# Patient Record
Sex: FEMALE | Race: WHITE | NOT HISPANIC OR LATINO | Employment: OTHER | URBAN - METROPOLITAN AREA
[De-identification: names, ages, dates, MRNs, and addresses within clinical notes are randomized per-mention and may not be internally consistent; named-entity substitution may affect disease eponyms.]

---

## 2018-04-16 ENCOUNTER — HOSPITAL ENCOUNTER (OUTPATIENT)
Dept: RADIOLOGY | Facility: HOSPITAL | Age: 53
Discharge: HOME | End: 2018-04-16
Attending: FAMILY MEDICINE
Payer: COMMERCIAL

## 2018-04-16 ENCOUNTER — OFFICE VISIT (OUTPATIENT)
Dept: INTERNAL MEDICINE | Facility: CLINIC | Age: 53
End: 2018-04-16
Payer: COMMERCIAL

## 2018-04-16 VITALS
HEIGHT: 65 IN | DIASTOLIC BLOOD PRESSURE: 70 MMHG | BODY MASS INDEX: 27.66 KG/M2 | HEART RATE: 72 BPM | WEIGHT: 166 LBS | TEMPERATURE: 99 F | SYSTOLIC BLOOD PRESSURE: 110 MMHG

## 2018-04-16 DIAGNOSIS — R01.1 MURMUR: ICD-10-CM

## 2018-04-16 DIAGNOSIS — R20.0 NUMBNESS AND TINGLING: ICD-10-CM

## 2018-04-16 DIAGNOSIS — M79.642 BILATERAL HAND PAIN: ICD-10-CM

## 2018-04-16 DIAGNOSIS — M79.672 BILATERAL FOOT PAIN: ICD-10-CM

## 2018-04-16 DIAGNOSIS — M79.641 BILATERAL HAND PAIN: ICD-10-CM

## 2018-04-16 DIAGNOSIS — M79.671 BILATERAL FOOT PAIN: ICD-10-CM

## 2018-04-16 DIAGNOSIS — M54.41 CHRONIC RIGHT-SIDED LOW BACK PAIN WITH RIGHT-SIDED SCIATICA: ICD-10-CM

## 2018-04-16 DIAGNOSIS — M54.41 CHRONIC RIGHT-SIDED LOW BACK PAIN WITH RIGHT-SIDED SCIATICA: Primary | ICD-10-CM

## 2018-04-16 DIAGNOSIS — G89.29 CHRONIC RIGHT-SIDED LOW BACK PAIN WITH RIGHT-SIDED SCIATICA: Primary | ICD-10-CM

## 2018-04-16 DIAGNOSIS — G89.29 CHRONIC RIGHT-SIDED LOW BACK PAIN WITH RIGHT-SIDED SCIATICA: ICD-10-CM

## 2018-04-16 DIAGNOSIS — M25.50 ARTHRALGIA, UNSPECIFIED JOINT: ICD-10-CM

## 2018-04-16 DIAGNOSIS — R53.83 FATIGUE, UNSPECIFIED TYPE: ICD-10-CM

## 2018-04-16 DIAGNOSIS — R20.2 NUMBNESS AND TINGLING: ICD-10-CM

## 2018-04-16 PROCEDURE — 73630 X-RAY EXAM OF FOOT: CPT | Mod: 26,50,, | Performed by: RADIOLOGY

## 2018-04-16 PROCEDURE — 72100 X-RAY EXAM L-S SPINE 2/3 VWS: CPT | Mod: 26,,, | Performed by: RADIOLOGY

## 2018-04-16 PROCEDURE — 72100 X-RAY EXAM L-S SPINE 2/3 VWS: CPT | Mod: TC,FY,PO

## 2018-04-16 PROCEDURE — 99999 PR PBB SHADOW E&M-NEW PATIENT-LVL III: CPT | Mod: PBBFAC,,, | Performed by: FAMILY MEDICINE

## 2018-04-16 PROCEDURE — 73130 X-RAY EXAM OF HAND: CPT | Mod: 26,50,, | Performed by: RADIOLOGY

## 2018-04-16 PROCEDURE — 99204 OFFICE O/P NEW MOD 45 MIN: CPT | Mod: S$GLB,,, | Performed by: FAMILY MEDICINE

## 2018-04-16 PROCEDURE — 73630 X-RAY EXAM OF FOOT: CPT | Mod: 50,TC,FY,PO

## 2018-04-16 PROCEDURE — 73130 X-RAY EXAM OF HAND: CPT | Mod: 50,TC,FY,PO

## 2018-04-16 RX ORDER — LORATADINE 10 MG/1
10 TABLET ORAL DAILY
COMMUNITY
End: 2020-06-08

## 2018-04-16 RX ORDER — FLUTICASONE PROPIONATE 50 MCG
1 SPRAY, SUSPENSION (ML) NASAL DAILY
COMMUNITY
End: 2020-06-08

## 2018-04-17 NOTE — PROGRESS NOTES
"Subjective:      Patient ID: Jhoana Sierra is a 53 y.o. female.    Chief Complaint: Establish Care (joint pain )    HPI  52 yo friendly female here to establish care.  She has been seeing Gyn regularly, up to date on Pap/Mammo/DEXA and fitkit screening.  Reports she has been dealing with chronic joint pain, back pain, muscular/nerve pain for years but it seems to have worsened this year/past 4 mos.  She reports a lot of pain in her hands/wrists/elbows, feet.  Some in her hips and her lower back.    No trauma/injury.  Taking naproxen regularly to help.  Has some joint changes in the fingers.  No swelling/redness/warmth.  Reports just feeling more fatigued overall.  Not sure if related to menopausal time or age.  Bowels move ok  No CP/SOB  Takes allergy meds PRN and some supplements.  No chronic problems that she is aware of.    History reviewed. No pertinent past medical history.  Family History   Problem Relation Age of Onset    Fibromyalgia Sister     Diabetes Neg Hx     Heart disease Neg Hx      Past Surgical History:   Procedure Laterality Date    OVARIAN CYST REMOVAL      WISDOM TOOTH EXTRACTION       Social History   Substance Use Topics    Smoking status: Never Smoker    Smokeless tobacco: Never Used    Alcohol use Yes      Comment: Occ use       /70   Pulse 72   Temp 99.2 °F (37.3 °C) (Tympanic)   Ht 5' 4.5" (1.638 m)   Wt 75.3 kg (166 lb 0.1 oz)   BMI 28.05 kg/m²     Review of Systems   Constitutional: Positive for activity change and fatigue. Negative for appetite change, chills, diaphoresis, fever and unexpected weight change.   HENT: Negative for ear pain, hearing loss, postnasal drip, rhinorrhea, tinnitus and trouble swallowing.    Eyes: Negative for discharge and visual disturbance.   Respiratory: Negative for cough, chest tightness, shortness of breath and wheezing.    Cardiovascular: Negative for chest pain, palpitations and leg swelling.   Gastrointestinal: Negative for " abdominal distention, blood in stool, constipation, diarrhea and vomiting.   Endocrine: Negative for polydipsia and polyuria.   Genitourinary: Negative for difficulty urinating, dysuria, frequency, hematuria, menstrual problem and urgency.   Musculoskeletal: Positive for arthralgias and back pain. Negative for joint swelling and neck pain.   Neurological: Negative for weakness and headaches.   Hematological: Negative for adenopathy.   Psychiatric/Behavioral: Negative for confusion, decreased concentration and dysphoric mood.       Objective:     Physical Exam   Constitutional: She is oriented to person, place, and time. She appears well-developed and well-nourished. No distress.   HENT:   Right Ear: External ear normal.   Left Ear: External ear normal.   Nose: Nose normal.   Mouth/Throat: Oropharynx is clear and moist.   Eyes: Conjunctivae are normal. Pupils are equal, round, and reactive to light.   Neck: Normal range of motion. Neck supple. Carotid bruit is not present. No thyromegaly present.   Cardiovascular: Normal rate and regular rhythm.    Murmur heard.  Pulmonary/Chest: Effort normal and breath sounds normal. No respiratory distress. She has no wheezes. She has no rales.   Abdominal: Soft. Bowel sounds are normal. She exhibits no distension. There is no tenderness. There is no guarding.   Musculoskeletal: She exhibits no edema.        Lumbar back: She exhibits normal range of motion.   BL fingers with some changes at DIP joints   Lymphadenopathy:     She has no cervical adenopathy.   Neurological: She is alert and oriented to person, place, and time. No cranial nerve deficit.   Skin: Skin is warm and dry. No rash noted.   Psychiatric: She has a normal mood and affect. Her behavior is normal. Judgment and thought content normal.   Nursing note and vitals reviewed.      Lab Results   Component Value Date    WBC 5.62 04/16/2018    HGB 13.4 04/16/2018    HCT 40.6 04/16/2018     04/16/2018    TSH 1.899  04/16/2018    HGBA1C 5.3 04/16/2018       Assessment:     1. Chronic right-sided low back pain with right-sided sciatica    2. Bilateral hand pain    3. Bilateral foot pain    4. Numbness and tingling    5. Arthralgia, unspecified joint    6. Fatigue, unspecified type    7. Murmur         Plan:     Chronic right-sided low back pain with right-sided sciatica  -     X-Ray Lumbar Spine AP And Lateral; Future; Expected date: 04/16/2018    Bilateral hand pain  -     X-Ray Hand 3 View Bilateral; Future; Expected date: 04/16/2018    Bilateral foot pain  -     Cancel: X-Ray Foot AP Bilateral; Future; Expected date: 04/16/2018    Numbness and tingling  -     Vitamin B12; Future; Expected date: 04/16/2018  -     Hemoglobin A1c; Future; Expected date: 04/16/2018    Arthralgia, unspecified joint  -     CBC auto differential; Future; Expected date: 04/16/2018  -     KHANG; Future; Expected date: 04/16/2018  -     Sedimentation rate, manual; Future; Expected date: 04/16/2018  -     Rheumatoid factor; Future; Expected date: 04/16/2018  -     Uric acid; Future; Expected date: 04/16/2018    Fatigue, unspecified type  -     TSH; Future; Expected date: 04/16/2018    Murmur  -     2D Echo w/ Color Flow Doppler; Future    Will rule out autoimmune/RF  Xrays shows some arthritic changes.  L spine ok, more muscular type pain.  Once labs come back, consider PT for back.  Mobic/muscle relaxer PRN and more exercise/water exercise.  Murmur is new, never been told she had one. Will get baseline Echo  Will get B12 and A1C, occ tingling feelings  F/u 2 wks for review/treatment plan

## 2018-04-18 ENCOUNTER — CLINICAL SUPPORT (OUTPATIENT)
Dept: CARDIOLOGY | Facility: CLINIC | Age: 53
End: 2018-04-18
Attending: FAMILY MEDICINE
Payer: COMMERCIAL

## 2018-04-18 DIAGNOSIS — R01.1 MURMUR: ICD-10-CM

## 2018-04-18 LAB
DIASTOLIC DYSFUNCTION: NO
ESTIMATED PA SYSTOLIC PRESSURE: 27.67
RETIRED EF AND QEF - SEE NOTES: 60 (ref 55–65)
TRICUSPID VALVE REGURGITATION: NORMAL

## 2018-04-18 PROCEDURE — 93306 TTE W/DOPPLER COMPLETE: CPT | Mod: S$GLB,,, | Performed by: INTERNAL MEDICINE

## 2018-04-30 ENCOUNTER — OFFICE VISIT (OUTPATIENT)
Dept: INTERNAL MEDICINE | Facility: CLINIC | Age: 53
End: 2018-04-30
Payer: COMMERCIAL

## 2018-04-30 VITALS
SYSTOLIC BLOOD PRESSURE: 120 MMHG | TEMPERATURE: 99 F | WEIGHT: 168.44 LBS | DIASTOLIC BLOOD PRESSURE: 90 MMHG | HEART RATE: 64 BPM | HEIGHT: 65 IN | BODY MASS INDEX: 28.06 KG/M2

## 2018-04-30 DIAGNOSIS — M79.7 FIBROMYALGIA: ICD-10-CM

## 2018-04-30 DIAGNOSIS — Z00.00 ANNUAL PHYSICAL EXAM: Primary | ICD-10-CM

## 2018-04-30 DIAGNOSIS — M15.9 GENERALIZED OA: ICD-10-CM

## 2018-04-30 PROCEDURE — 99396 PREV VISIT EST AGE 40-64: CPT | Mod: S$GLB,,, | Performed by: FAMILY MEDICINE

## 2018-04-30 PROCEDURE — 99999 PR PBB SHADOW E&M-EST. PATIENT-LVL III: CPT | Mod: PBBFAC,,, | Performed by: FAMILY MEDICINE

## 2018-04-30 RX ORDER — TIZANIDINE 4 MG/1
4 TABLET ORAL 3 TIMES DAILY PRN
Qty: 30 TABLET | Refills: 1 | Status: SHIPPED | OUTPATIENT
Start: 2018-04-30 | End: 2018-09-04 | Stop reason: SDUPTHER

## 2018-04-30 RX ORDER — MELOXICAM 15 MG/1
15 TABLET ORAL DAILY PRN
Qty: 30 TABLET | Refills: 1 | Status: SHIPPED | OUTPATIENT
Start: 2018-04-30 | End: 2018-09-04 | Stop reason: SDUPTHER

## 2018-04-30 NOTE — PROGRESS NOTES
"Subjective:      Patient ID: Jhoana Sierra is a 53 y.o. female.    Chief Complaint: Follow-up    HPI  54 yo female here for f/u from last visit and to update annual.  Gyn/Fit Kit already done/normal.  Adacel up to date    History reviewed. No pertinent past medical history.  Family History   Problem Relation Age of Onset    Fibromyalgia Sister     Diabetes Neg Hx     Heart disease Neg Hx      Past Surgical History:   Procedure Laterality Date    OVARIAN CYST REMOVAL      WISDOM TOOTH EXTRACTION       Social History   Substance Use Topics    Smoking status: Never Smoker    Smokeless tobacco: Never Used    Alcohol use Yes      Comment: Occ use       BP (!) 120/90 (BP Location: Right arm, Patient Position: Sitting, BP Method: Large (Manual))   Pulse 64   Temp 99.3 °F (37.4 °C) (Tympanic)   Ht 5' 4.5" (1.638 m)   Wt 76.4 kg (168 lb 6.9 oz)   BMI 28.46 kg/m²     Review of Systems   Constitutional: Negative for activity change, appetite change, chills, diaphoresis, fever and unexpected weight change.   HENT: Negative for ear pain, hearing loss, postnasal drip, rhinorrhea, tinnitus and trouble swallowing.    Eyes: Negative for discharge and visual disturbance.   Respiratory: Negative for cough, chest tightness, shortness of breath and wheezing.    Cardiovascular: Negative for chest pain, palpitations and leg swelling.   Gastrointestinal: Negative for abdominal distention, blood in stool, constipation, diarrhea and vomiting.   Endocrine: Negative for polydipsia and polyuria.   Genitourinary: Negative for difficulty urinating, dysuria, frequency, hematuria, menstrual problem and urgency.   Musculoskeletal: Positive for arthralgias. Negative for back pain, joint swelling and neck pain.   Neurological: Negative for weakness and headaches.   Hematological: Negative for adenopathy.   Psychiatric/Behavioral: Negative for confusion, decreased concentration and dysphoric mood.       Objective:     Physical Exam "   Constitutional: She is oriented to person, place, and time. She appears well-developed and well-nourished. No distress.   HENT:   Right Ear: External ear normal.   Left Ear: External ear normal.   Nose: Nose normal.   Mouth/Throat: Oropharynx is clear and moist.   Eyes: Conjunctivae are normal. Pupils are equal, round, and reactive to light.   Neck: Normal range of motion. Neck supple. Carotid bruit is not present.   Cardiovascular: Normal rate and regular rhythm.  Exam reveals no gallop.    Murmur heard.  Pulmonary/Chest: Effort normal and breath sounds normal. No respiratory distress. She has no wheezes. She has no rales.   Abdominal: Soft. Bowel sounds are normal. She exhibits no distension. There is no tenderness. There is no guarding.   Musculoskeletal: Normal range of motion. She exhibits deformity. She exhibits no edema.   Changes in DIP of BL hands   Neurological: She is alert and oriented to person, place, and time. No cranial nerve deficit.   Skin: Skin is warm and dry. No rash noted.   Psychiatric: She has a normal mood and affect. Her behavior is normal. Judgment and thought content normal.   Nursing note and vitals reviewed.      Lab Results   Component Value Date    WBC 5.62 04/16/2018    HGB 13.4 04/16/2018    HCT 40.6 04/16/2018     04/16/2018    TSH 1.899 04/16/2018    HGBA1C 5.3 04/16/2018       Assessment:     1. Annual physical exam    2. Generalized OA    3. Fibromyalgia         Plan:     Annual physical exam  -     Lipid panel; Future; Expected date: 05/02/2018  -     Comprehensive metabolic panel; Future; Expected date: 05/02/2018  -     Hepatitis C antibody; Future; Expected date: 05/02/2018    Generalized OA  -     Ambulatory Referral to Physical/Occupational Therapy    Fibromyalgia  -     Ambulatory Referral to Physical/Occupational Therapy    Other orders  -     meloxicam (MOBIC) 15 MG tablet; Take 1 tablet (15 mg total) by mouth daily as needed for Pain.  Dispense: 30 tablet;  Refill: 1  -     tiZANidine (ZANAFLEX) 4 MG tablet; Take 1 tablet (4 mg total) by mouth 3 (three) times daily as needed.  Dispense: 30 tablet; Refill: 1    Reviewed labs/imaging after last visit  Update screening labs this week  Findings/symptoms consistent with OA/possibly fibro  Trial of mobic/zanaflex PRN  Recommend working on diet, starting WW.  Will send to aquatic therapy as well  F/u 3-4 mos

## 2018-05-02 ENCOUNTER — LAB VISIT (OUTPATIENT)
Dept: LAB | Facility: HOSPITAL | Age: 53
End: 2018-05-02
Attending: FAMILY MEDICINE
Payer: COMMERCIAL

## 2018-05-02 DIAGNOSIS — Z00.00 ANNUAL PHYSICAL EXAM: ICD-10-CM

## 2018-05-02 LAB
ALBUMIN SERPL BCP-MCNC: 4.3 G/DL
ALP SERPL-CCNC: 74 U/L
ALT SERPL W/O P-5'-P-CCNC: 16 U/L
ANION GAP SERPL CALC-SCNC: 10 MMOL/L
AST SERPL-CCNC: 18 U/L
BILIRUB SERPL-MCNC: 0.7 MG/DL
BUN SERPL-MCNC: 26 MG/DL
CALCIUM SERPL-MCNC: 10.3 MG/DL
CHLORIDE SERPL-SCNC: 103 MMOL/L
CHOLEST SERPL-MCNC: 223 MG/DL
CHOLEST/HDLC SERPL: 3.1 {RATIO}
CO2 SERPL-SCNC: 28 MMOL/L
CREAT SERPL-MCNC: 0.8 MG/DL
EST. GFR  (AFRICAN AMERICAN): >60 ML/MIN/1.73 M^2
EST. GFR  (NON AFRICAN AMERICAN): >60 ML/MIN/1.73 M^2
GLUCOSE SERPL-MCNC: 80 MG/DL
HDLC SERPL-MCNC: 73 MG/DL
HDLC SERPL: 32.7 %
LDLC SERPL CALC-MCNC: 134.2 MG/DL
NONHDLC SERPL-MCNC: 150 MG/DL
POTASSIUM SERPL-SCNC: 4.6 MMOL/L
PROT SERPL-MCNC: 7.9 G/DL
SODIUM SERPL-SCNC: 141 MMOL/L
TRIGL SERPL-MCNC: 79 MG/DL

## 2018-05-02 PROCEDURE — 80061 LIPID PANEL: CPT

## 2018-05-02 PROCEDURE — 86803 HEPATITIS C AB TEST: CPT

## 2018-05-02 PROCEDURE — 36415 COLL VENOUS BLD VENIPUNCTURE: CPT | Mod: PO

## 2018-05-02 PROCEDURE — 80053 COMPREHEN METABOLIC PANEL: CPT

## 2018-05-03 LAB — HCV AB SERPL QL IA: NEGATIVE

## 2018-09-04 ENCOUNTER — PATIENT MESSAGE (OUTPATIENT)
Dept: INTERNAL MEDICINE | Facility: CLINIC | Age: 53
End: 2018-09-04

## 2018-09-04 RX ORDER — TIZANIDINE 4 MG/1
TABLET ORAL
COMMUNITY
Start: 2018-06-21 | End: 2018-09-04 | Stop reason: SDUPTHER

## 2018-09-04 RX ORDER — TIZANIDINE HYDROCHLORIDE 4 MG/1
CAPSULE, GELATIN COATED ORAL 3 TIMES DAILY PRN
COMMUNITY
Start: 2018-04-30 | End: 2019-06-07 | Stop reason: SDUPTHER

## 2018-09-04 RX ORDER — TIZANIDINE 4 MG/1
TABLET ORAL
Qty: 30 TABLET | Refills: 1 | Status: SHIPPED | OUTPATIENT
Start: 2018-09-04 | End: 2019-06-07 | Stop reason: SDUPTHER

## 2018-09-04 RX ORDER — MELOXICAM 15 MG/1
15 TABLET ORAL DAILY PRN
Qty: 30 TABLET | Refills: 1 | Status: SHIPPED | OUTPATIENT
Start: 2018-09-04 | End: 2019-06-07 | Stop reason: SDUPTHER

## 2019-05-13 ENCOUNTER — PATIENT MESSAGE (OUTPATIENT)
Dept: ADMINISTRATIVE | Facility: HOSPITAL | Age: 54
End: 2019-05-13

## 2019-05-14 DIAGNOSIS — Z12.11 COLON CANCER SCREENING: ICD-10-CM

## 2019-06-07 ENCOUNTER — OFFICE VISIT (OUTPATIENT)
Dept: INTERNAL MEDICINE | Facility: CLINIC | Age: 54
End: 2019-06-07
Payer: COMMERCIAL

## 2019-06-07 VITALS
SYSTOLIC BLOOD PRESSURE: 112 MMHG | DIASTOLIC BLOOD PRESSURE: 78 MMHG | BODY MASS INDEX: 24.65 KG/M2 | HEIGHT: 65 IN | HEART RATE: 74 BPM | TEMPERATURE: 99 F | WEIGHT: 147.94 LBS

## 2019-06-07 DIAGNOSIS — Z00.00 ANNUAL PHYSICAL EXAM: Primary | ICD-10-CM

## 2019-06-07 PROCEDURE — 99999 PR PBB SHADOW E&M-EST. PATIENT-LVL III: ICD-10-PCS | Mod: PBBFAC,,, | Performed by: FAMILY MEDICINE

## 2019-06-07 PROCEDURE — 99999 PR PBB SHADOW E&M-EST. PATIENT-LVL III: CPT | Mod: PBBFAC,,, | Performed by: FAMILY MEDICINE

## 2019-06-07 PROCEDURE — 99396 PREV VISIT EST AGE 40-64: CPT | Mod: S$GLB,,, | Performed by: FAMILY MEDICINE

## 2019-06-07 PROCEDURE — 99396 PR PREVENTIVE VISIT,EST,40-64: ICD-10-PCS | Mod: S$GLB,,, | Performed by: FAMILY MEDICINE

## 2019-06-07 RX ORDER — TIZANIDINE 4 MG/1
4 TABLET ORAL 3 TIMES DAILY PRN
Qty: 30 TABLET | Refills: 3 | Status: SHIPPED | OUTPATIENT
Start: 2019-06-07 | End: 2021-05-05 | Stop reason: SDUPTHER

## 2019-06-07 RX ORDER — MELOXICAM 15 MG/1
15 TABLET ORAL DAILY PRN
Qty: 30 TABLET | Refills: 3 | Status: SHIPPED | OUTPATIENT
Start: 2019-06-07 | End: 2020-09-01 | Stop reason: SDUPTHER

## 2019-06-07 RX ORDER — ESTRADIOL AND NORETHINDRONE ACETATE 1; .5 MG/1; MG/1
1 TABLET, FILM COATED ORAL DAILY
Refills: 5 | COMMUNITY
Start: 2019-05-21 | End: 2020-06-08

## 2019-06-07 NOTE — PROGRESS NOTES
"Subjective:      Patient ID: Jhoana Sierra is a 54 y.o. female.    Chief Complaint: Annual Exam    HPI   55 yo female here today for annual visit.  She is doing great overall.  Since she cut out glutein, weight is down a lot and her pain has been better controlled with the Fibro.  She still uses mobic/zanaflex on PRN basis.  On HRT per her gyn    History reviewed. No pertinent past medical history.  Family History   Problem Relation Age of Onset    Fibromyalgia Sister     Diabetes Neg Hx     Heart disease Neg Hx      Past Surgical History:   Procedure Laterality Date    OVARIAN CYST REMOVAL      WISDOM TOOTH EXTRACTION       Social History     Tobacco Use    Smoking status: Never Smoker    Smokeless tobacco: Never Used   Substance Use Topics    Alcohol use: Yes     Frequency: Monthly or less     Drinks per session: 1 or 2     Binge frequency: Never     Comment: Occ use    Drug use: No       /78 (BP Location: Left arm, Patient Position: Sitting, BP Method: Large (Manual))   Pulse 74   Temp 98.8 °F (37.1 °C) (Oral)   Ht 5' 4.5" (1.638 m)   Wt 67.1 kg (147 lb 14.9 oz)   BMI 25.00 kg/m²     Review of Systems   Constitutional: Negative for activity change, appetite change, chills, diaphoresis, fatigue, fever and unexpected weight change.   HENT: Negative for ear pain, hearing loss, postnasal drip, rhinorrhea, tinnitus and trouble swallowing.    Eyes: Negative for discharge and visual disturbance.   Respiratory: Negative for cough, chest tightness, shortness of breath and wheezing.    Cardiovascular: Negative for chest pain, palpitations and leg swelling.   Gastrointestinal: Negative for abdominal distention, blood in stool, constipation, diarrhea and vomiting.   Endocrine: Negative for polydipsia and polyuria.   Genitourinary: Negative for difficulty urinating, dysuria, frequency, hematuria, menstrual problem and urgency.   Musculoskeletal: Negative for arthralgias, back pain, joint swelling and " neck pain.   Neurological: Negative for weakness and headaches.   Hematological: Negative for adenopathy.   Psychiatric/Behavioral: Negative for confusion, decreased concentration and dysphoric mood.       Objective:     Physical Exam   Constitutional: She is oriented to person, place, and time. She appears well-developed and well-nourished. No distress.   HENT:   Right Ear: External ear normal.   Left Ear: External ear normal.   Nose: Nose normal.   Mouth/Throat: Oropharynx is clear and moist.   Eyes: Pupils are equal, round, and reactive to light. Conjunctivae are normal.   Neck: Normal range of motion. Neck supple. Carotid bruit is not present. No thyromegaly present.   Cardiovascular: Normal rate, regular rhythm and normal heart sounds.   Pulmonary/Chest: Effort normal and breath sounds normal. No respiratory distress. She has no wheezes. She has no rales.   Abdominal: Soft. Bowel sounds are normal. She exhibits no distension. There is no tenderness. There is no guarding.   Musculoskeletal: She exhibits no edema.   Lymphadenopathy:     She has no cervical adenopathy.   Neurological: She is alert and oriented to person, place, and time. No cranial nerve deficit.   Skin: Skin is warm and dry. No rash noted.   Psychiatric: She has a normal mood and affect. Her behavior is normal. Judgment and thought content normal.   Nursing note and vitals reviewed.      Lab Results   Component Value Date    WBC 5.62 04/16/2018    HGB 13.4 04/16/2018    HCT 40.6 04/16/2018     04/16/2018    CHOL 223 (H) 05/02/2018    TRIG 79 05/02/2018    HDL 73 05/02/2018    ALT 16 05/02/2018    AST 18 05/02/2018     05/02/2018    K 4.6 05/02/2018     05/02/2018    CREATININE 0.8 05/02/2018    BUN 26 (H) 05/02/2018    CO2 28 05/02/2018    TSH 1.899 04/16/2018    HGBA1C 5.3 04/16/2018       Assessment:     1. Annual physical exam         Plan:     Annual physical exam  -     CBC auto differential; Future; Expected date:  06/07/2019  -     Comprehensive metabolic panel; Future; Expected date: 06/07/2019  -     Lipid panel; Future; Expected date: 06/07/2019    Other orders  -     meloxicam (MOBIC) 15 MG tablet; Take 1 tablet (15 mg total) by mouth daily as needed for Pain.  Dispense: 30 tablet; Refill: 3  -     tiZANidine (ZANAFLEX) 4 MG tablet; Take 1 tablet (4 mg total) by mouth 3 (three) times daily as needed.  Dispense: 30 tablet; Refill: 3    Update screening labs  Med refills  Pt doing great with weight and exercise.    Cont current regimen, meds  F/u annually and PRN  Did her fitkit with her Gyn

## 2019-06-11 ENCOUNTER — LAB VISIT (OUTPATIENT)
Dept: LAB | Facility: HOSPITAL | Age: 54
End: 2019-06-11
Attending: FAMILY MEDICINE
Payer: COMMERCIAL

## 2019-06-11 DIAGNOSIS — Z00.00 ANNUAL PHYSICAL EXAM: ICD-10-CM

## 2019-06-11 LAB
BASOPHILS # BLD AUTO: 0.02 K/UL (ref 0–0.2)
BASOPHILS NFR BLD: 0.5 % (ref 0–1.9)
DIFFERENTIAL METHOD: ABNORMAL
EOSINOPHIL # BLD AUTO: 0.1 K/UL (ref 0–0.5)
EOSINOPHIL NFR BLD: 1.5 % (ref 0–8)
ERYTHROCYTE [DISTWIDTH] IN BLOOD BY AUTOMATED COUNT: 12.6 % (ref 11.5–14.5)
HCT VFR BLD AUTO: 40.9 % (ref 37–48.5)
HGB BLD-MCNC: 13.3 G/DL (ref 12–16)
IMM GRANULOCYTES # BLD AUTO: 0.01 K/UL (ref 0–0.04)
IMM GRANULOCYTES NFR BLD AUTO: 0.2 % (ref 0–0.5)
LYMPHOCYTES # BLD AUTO: 1.3 K/UL (ref 1–4.8)
LYMPHOCYTES NFR BLD: 32.5 % (ref 18–48)
MCH RBC QN AUTO: 32 PG (ref 27–31)
MCHC RBC AUTO-ENTMCNC: 32.5 G/DL (ref 32–36)
MCV RBC AUTO: 99 FL (ref 82–98)
MONOCYTES # BLD AUTO: 0.3 K/UL (ref 0.3–1)
MONOCYTES NFR BLD: 6.7 % (ref 4–15)
NEUTROPHILS # BLD AUTO: 2.4 K/UL (ref 1.8–7.7)
NEUTROPHILS NFR BLD: 58.6 % (ref 38–73)
NRBC BLD-RTO: 0 /100 WBC
PLATELET # BLD AUTO: 231 K/UL (ref 150–350)
PMV BLD AUTO: 10 FL (ref 9.2–12.9)
RBC # BLD AUTO: 4.15 M/UL (ref 4–5.4)
WBC # BLD AUTO: 4.06 K/UL (ref 3.9–12.7)

## 2019-06-11 PROCEDURE — 85025 COMPLETE CBC W/AUTO DIFF WBC: CPT

## 2019-06-11 PROCEDURE — 80053 COMPREHEN METABOLIC PANEL: CPT

## 2019-06-11 PROCEDURE — 80061 LIPID PANEL: CPT

## 2019-06-11 PROCEDURE — 36415 COLL VENOUS BLD VENIPUNCTURE: CPT | Mod: PO

## 2019-06-12 LAB
ALBUMIN SERPL BCP-MCNC: 4 G/DL (ref 3.5–5.2)
ALP SERPL-CCNC: 57 U/L (ref 55–135)
ALT SERPL W/O P-5'-P-CCNC: 17 U/L (ref 10–44)
ANION GAP SERPL CALC-SCNC: 11 MMOL/L (ref 8–16)
AST SERPL-CCNC: 18 U/L (ref 10–40)
BILIRUB SERPL-MCNC: 0.4 MG/DL (ref 0.1–1)
BUN SERPL-MCNC: 20 MG/DL (ref 6–20)
CALCIUM SERPL-MCNC: 9.8 MG/DL (ref 8.7–10.5)
CHLORIDE SERPL-SCNC: 105 MMOL/L (ref 95–110)
CHOLEST SERPL-MCNC: 195 MG/DL (ref 120–199)
CHOLEST/HDLC SERPL: 2.7 {RATIO} (ref 2–5)
CO2 SERPL-SCNC: 24 MMOL/L (ref 23–29)
CREAT SERPL-MCNC: 0.8 MG/DL (ref 0.5–1.4)
EST. GFR  (AFRICAN AMERICAN): >60 ML/MIN/1.73 M^2
EST. GFR  (NON AFRICAN AMERICAN): >60 ML/MIN/1.73 M^2
GLUCOSE SERPL-MCNC: 81 MG/DL (ref 70–110)
HDLC SERPL-MCNC: 71 MG/DL (ref 40–75)
HDLC SERPL: 36.4 % (ref 20–50)
LDLC SERPL CALC-MCNC: 105.8 MG/DL (ref 63–159)
NONHDLC SERPL-MCNC: 124 MG/DL
POTASSIUM SERPL-SCNC: 4.2 MMOL/L (ref 3.5–5.1)
PROT SERPL-MCNC: 7.2 G/DL (ref 6–8.4)
SODIUM SERPL-SCNC: 140 MMOL/L (ref 136–145)
TRIGL SERPL-MCNC: 91 MG/DL (ref 30–150)

## 2019-10-04 ENCOUNTER — PATIENT OUTREACH (OUTPATIENT)
Dept: ADMINISTRATIVE | Facility: HOSPITAL | Age: 54
End: 2019-10-04

## 2019-11-22 ENCOUNTER — PATIENT MESSAGE (OUTPATIENT)
Dept: ADMINISTRATIVE | Facility: HOSPITAL | Age: 54
End: 2019-11-22

## 2020-06-08 ENCOUNTER — OFFICE VISIT (OUTPATIENT)
Dept: INTERNAL MEDICINE | Facility: CLINIC | Age: 55
End: 2020-06-08
Payer: COMMERCIAL

## 2020-06-08 VITALS
WEIGHT: 151.69 LBS | SYSTOLIC BLOOD PRESSURE: 122 MMHG | BODY MASS INDEX: 25.27 KG/M2 | DIASTOLIC BLOOD PRESSURE: 78 MMHG | TEMPERATURE: 98 F | HEIGHT: 65 IN | HEART RATE: 74 BPM

## 2020-06-08 DIAGNOSIS — Z00.00 ANNUAL PHYSICAL EXAM: Primary | ICD-10-CM

## 2020-06-08 DIAGNOSIS — Z12.11 COLON CANCER SCREENING: ICD-10-CM

## 2020-06-08 PROCEDURE — 99396 PR PREVENTIVE VISIT,EST,40-64: ICD-10-PCS | Mod: S$GLB,,, | Performed by: FAMILY MEDICINE

## 2020-06-08 PROCEDURE — 99396 PREV VISIT EST AGE 40-64: CPT | Mod: S$GLB,,, | Performed by: FAMILY MEDICINE

## 2020-06-08 PROCEDURE — 99999 PR PBB SHADOW E&M-EST. PATIENT-LVL III: CPT | Mod: PBBFAC,,, | Performed by: FAMILY MEDICINE

## 2020-06-08 PROCEDURE — 99999 PR PBB SHADOW E&M-EST. PATIENT-LVL III: ICD-10-PCS | Mod: PBBFAC,,, | Performed by: FAMILY MEDICINE

## 2020-06-08 NOTE — PROGRESS NOTES
"Subjective:      Patient ID: Jhoana Sierra is a 55 y.o. female.    Chief Complaint: Annual Exam    HPI   56 yo female here for annual visit.  Doing well overall.  Struggles with chronic allergy/congestion.  Antihist and flonase dry her out too much/causing nasal bleeding  Sudafed most helpful  Has not done colonoscopy//has been doing fit kit    History reviewed. No pertinent past medical history.  Family History   Problem Relation Age of Onset    Fibromyalgia Sister     Diabetes Neg Hx     Heart disease Neg Hx      Past Surgical History:   Procedure Laterality Date    OVARIAN CYST REMOVAL      WISDOM TOOTH EXTRACTION       Social History     Tobacco Use    Smoking status: Never Smoker    Smokeless tobacco: Never Used   Substance Use Topics    Alcohol use: Yes     Frequency: Monthly or less     Drinks per session: 1 or 2     Binge frequency: Never     Comment: Occ use    Drug use: No       /78 (BP Location: Left arm, Patient Position: Sitting, BP Method: Large (Manual))   Pulse 74   Temp 98.3 °F (36.8 °C) (Oral)   Ht 5' 4.5" (1.638 m)   Wt 68.8 kg (151 lb 10.8 oz)   BMI 25.63 kg/m²     Review of Systems   Constitutional: Negative for activity change and unexpected weight change.   HENT: Negative for hearing loss, rhinorrhea and trouble swallowing.    Eyes: Negative for discharge and visual disturbance.   Respiratory: Negative for chest tightness and wheezing.    Cardiovascular: Negative for chest pain and palpitations.   Gastrointestinal: Negative for blood in stool, constipation, diarrhea and vomiting.   Endocrine: Negative for polydipsia and polyuria.   Genitourinary: Negative for difficulty urinating, dysuria, hematuria and menstrual problem.   Musculoskeletal: Negative for arthralgias, joint swelling and neck pain.   Neurological: Negative for weakness and headaches.   Psychiatric/Behavioral: Negative for confusion and dysphoric mood.       Objective:     Physical Exam   Constitutional: " She is oriented to person, place, and time. She appears well-developed and well-nourished. No distress.   HENT:   Nose: Nose normal.   Mouth/Throat: Oropharynx is clear and moist.   Eyes: Pupils are equal, round, and reactive to light. Conjunctivae are normal.   Neck: Normal range of motion. Neck supple. Carotid bruit is not present.   Cardiovascular: Normal rate, regular rhythm and normal heart sounds.   Pulmonary/Chest: Effort normal and breath sounds normal. No respiratory distress. She has no wheezes. She has no rales.   Abdominal: Soft. Bowel sounds are normal. She exhibits no distension. There is no tenderness. There is no guarding.   Musculoskeletal: She exhibits no edema.   Neurological: She is alert and oriented to person, place, and time. No cranial nerve deficit.   Skin: Skin is warm and dry. No rash noted.   Psychiatric: She has a normal mood and affect. Her behavior is normal. Judgment and thought content normal.   Nursing note and vitals reviewed.      Lab Results   Component Value Date    WBC 4.06 06/11/2019    HGB 13.3 06/11/2019    HCT 40.9 06/11/2019     06/11/2019    CHOL 195 06/11/2019    TRIG 91 06/11/2019    HDL 71 06/11/2019    ALT 17 06/11/2019    AST 18 06/11/2019     06/11/2019    K 4.2 06/11/2019     06/11/2019    CREATININE 0.8 06/11/2019    BUN 20 06/11/2019    CO2 24 06/11/2019    TSH 1.899 04/16/2018    HGBA1C 5.3 04/16/2018       Assessment:     1. Annual physical exam    2. Colon cancer screening         Plan:     Annual physical exam  -     CBC auto differential; Future; Expected date: 06/08/2020  -     Comprehensive metabolic panel; Future; Expected date: 06/08/2020  -     Hemoglobin A1C; Future; Expected date: 06/08/2020  -     Lipid Panel; Future; Expected date: 06/08/2020  -     TSH; Future; Expected date: 06/08/2020    Colon cancer screening  -     Fecal Immunochemical Test (iFOBT); Future; Expected date: 06/08/2020    Update annual labs today  Fit  Kit//recommend colonoscopy for baseline  Ok to use sudafed PRN  Healthy lifestyle  Get mammo/dexa from [facility]  F/u annually and PRN

## 2020-06-10 ENCOUNTER — LAB VISIT (OUTPATIENT)
Dept: LAB | Facility: HOSPITAL | Age: 55
End: 2020-06-10
Attending: FAMILY MEDICINE
Payer: COMMERCIAL

## 2020-06-10 DIAGNOSIS — Z00.00 ANNUAL PHYSICAL EXAM: ICD-10-CM

## 2020-06-10 DIAGNOSIS — Z12.11 COLON CANCER SCREENING: ICD-10-CM

## 2020-06-10 LAB
ALBUMIN SERPL BCP-MCNC: 4.2 G/DL (ref 3.5–5.2)
ALP SERPL-CCNC: 77 U/L (ref 55–135)
ALT SERPL W/O P-5'-P-CCNC: 16 U/L (ref 10–44)
ANION GAP SERPL CALC-SCNC: 7 MMOL/L (ref 8–16)
AST SERPL-CCNC: 17 U/L (ref 10–40)
BASOPHILS # BLD AUTO: 0.03 K/UL (ref 0–0.2)
BASOPHILS NFR BLD: 0.7 % (ref 0–1.9)
BILIRUB SERPL-MCNC: 0.4 MG/DL (ref 0.1–1)
BUN SERPL-MCNC: 19 MG/DL (ref 6–20)
CALCIUM SERPL-MCNC: 9.8 MG/DL (ref 8.7–10.5)
CHLORIDE SERPL-SCNC: 105 MMOL/L (ref 95–110)
CHOLEST SERPL-MCNC: 206 MG/DL (ref 120–199)
CHOLEST/HDLC SERPL: 2.7 {RATIO} (ref 2–5)
CO2 SERPL-SCNC: 29 MMOL/L (ref 23–29)
CREAT SERPL-MCNC: 0.8 MG/DL (ref 0.5–1.4)
DIFFERENTIAL METHOD: ABNORMAL
EOSINOPHIL # BLD AUTO: 0.1 K/UL (ref 0–0.5)
EOSINOPHIL NFR BLD: 2.2 % (ref 0–8)
ERYTHROCYTE [DISTWIDTH] IN BLOOD BY AUTOMATED COUNT: 12.8 % (ref 11.5–14.5)
EST. GFR  (AFRICAN AMERICAN): >60 ML/MIN/1.73 M^2
EST. GFR  (NON AFRICAN AMERICAN): >60 ML/MIN/1.73 M^2
GLUCOSE SERPL-MCNC: 84 MG/DL (ref 70–110)
HCT VFR BLD AUTO: 40.3 % (ref 37–48.5)
HDLC SERPL-MCNC: 75 MG/DL (ref 40–75)
HDLC SERPL: 36.4 % (ref 20–50)
HGB BLD-MCNC: 12.8 G/DL (ref 12–16)
IMM GRANULOCYTES # BLD AUTO: 0.01 K/UL (ref 0–0.04)
IMM GRANULOCYTES NFR BLD AUTO: 0.2 % (ref 0–0.5)
LDLC SERPL CALC-MCNC: 113.4 MG/DL (ref 63–159)
LYMPHOCYTES # BLD AUTO: 1.6 K/UL (ref 1–4.8)
LYMPHOCYTES NFR BLD: 35.4 % (ref 18–48)
MCH RBC QN AUTO: 30.5 PG (ref 27–31)
MCHC RBC AUTO-ENTMCNC: 31.8 G/DL (ref 32–36)
MCV RBC AUTO: 96 FL (ref 82–98)
MONOCYTES # BLD AUTO: 0.3 K/UL (ref 0.3–1)
MONOCYTES NFR BLD: 7 % (ref 4–15)
NEUTROPHILS # BLD AUTO: 2.5 K/UL (ref 1.8–7.7)
NEUTROPHILS NFR BLD: 54.5 % (ref 38–73)
NONHDLC SERPL-MCNC: 131 MG/DL
NRBC BLD-RTO: 0 /100 WBC
PLATELET # BLD AUTO: 248 K/UL (ref 150–350)
PMV BLD AUTO: 10.1 FL (ref 9.2–12.9)
POTASSIUM SERPL-SCNC: 4.3 MMOL/L (ref 3.5–5.1)
PROT SERPL-MCNC: 7.5 G/DL (ref 6–8.4)
RBC # BLD AUTO: 4.2 M/UL (ref 4–5.4)
SODIUM SERPL-SCNC: 141 MMOL/L (ref 136–145)
TRIGL SERPL-MCNC: 88 MG/DL (ref 30–150)
TSH SERPL DL<=0.005 MIU/L-ACNC: 2.59 UIU/ML (ref 0.4–4)
WBC # BLD AUTO: 4.57 K/UL (ref 3.9–12.7)

## 2020-06-10 PROCEDURE — 80053 COMPREHEN METABOLIC PANEL: CPT

## 2020-06-10 PROCEDURE — 83036 HEMOGLOBIN GLYCOSYLATED A1C: CPT

## 2020-06-10 PROCEDURE — 85025 COMPLETE CBC W/AUTO DIFF WBC: CPT

## 2020-06-10 PROCEDURE — 36415 COLL VENOUS BLD VENIPUNCTURE: CPT | Mod: PO

## 2020-06-10 PROCEDURE — 84443 ASSAY THYROID STIM HORMONE: CPT

## 2020-06-10 PROCEDURE — 82274 ASSAY TEST FOR BLOOD FECAL: CPT

## 2020-06-10 PROCEDURE — 80061 LIPID PANEL: CPT

## 2020-06-11 LAB
ESTIMATED AVG GLUCOSE: 103 MG/DL (ref 68–131)
HBA1C MFR BLD HPLC: 5.2 % (ref 4–5.6)

## 2020-06-12 ENCOUNTER — PATIENT OUTREACH (OUTPATIENT)
Dept: ADMINISTRATIVE | Facility: HOSPITAL | Age: 55
End: 2020-06-12

## 2020-06-25 LAB — HEMOCCULT STL QL IA: POSITIVE

## 2020-06-26 ENCOUNTER — PATIENT MESSAGE (OUTPATIENT)
Dept: INTERNAL MEDICINE | Facility: CLINIC | Age: 55
End: 2020-06-26

## 2020-06-26 DIAGNOSIS — Z12.11 COLON CANCER SCREENING: Primary | ICD-10-CM

## 2020-06-26 DIAGNOSIS — R19.5 POSITIVE FECAL IMMUNOCHEMICAL TEST: ICD-10-CM

## 2020-06-26 NOTE — TELEPHONE ENCOUNTER
Called and let pt know that stool kit was + for blood she will need to do colonoscopy.  Dr. Nguyen placed orders and the Gastro  will call to schedule her.

## 2020-07-02 ENCOUNTER — TELEPHONE (OUTPATIENT)
Dept: ENDOSCOPY | Facility: HOSPITAL | Age: 55
End: 2020-07-02

## 2020-07-02 NOTE — TELEPHONE ENCOUNTER
COVID Screening     1. Have you had a fever in the last 7 days or have you used fever reducing medicines for a fever in the last 7 days?  yes    Advised pt to follow up with PCP. [email or web address]

## 2020-07-19 ENCOUNTER — LAB VISIT (OUTPATIENT)
Dept: PRIMARY CARE CLINIC | Facility: CLINIC | Age: 55
End: 2020-07-19
Payer: COMMERCIAL

## 2020-07-19 DIAGNOSIS — Z00.6 RESEARCH SUBJECT: Primary | ICD-10-CM

## 2020-07-19 LAB — SARS-COV-2 IGG SERPLBLD QL IA.RAPID: NEGATIVE

## 2020-07-19 PROCEDURE — U0003 INFECTIOUS AGENT DETECTION BY NUCLEIC ACID (DNA OR RNA); SEVERE ACUTE RESPIRATORY SYNDROME CORONAVIRUS 2 (SARS-COV-2) (CORONAVIRUS DISEASE [COVID-19]), AMPLIFIED PROBE TECHNIQUE, MAKING USE OF HIGH THROUGHPUT TECHNOLOGIES AS DESCRIBED BY CMS-2020-01-R: HCPCS

## 2020-07-19 PROCEDURE — 86769 SARS-COV-2 COVID-19 ANTIBODY: CPT

## 2020-07-19 NOTE — RESEARCH
Date of Consent: 7/19/2020    Sponsor: [facility]    Study Title/IRB Number: Observational study of Sars-CoV2 Immunoglobulin G (IgG) seroprevalence among the [facility] population over time 2020.163  Principle Investigator: Madonna Morris, PhD    Did the patient need translation services? No   name: N/A    Prior to the Informed Consent (IC) being signed, or any study protocol required data collection, testing, procedure, or intervention being performed, the following was done and/or discussed:   Patient was given a paper copy of the IC for review    Patient was given study FAQ   Purpose of the study and qualifications to participate    Study design and tests or procedures done at this visit   Confidentiality and HIPAA Authorization for Release of Medical Records for the research trial/ subject's rights/research related injury   Risk, Benefits, Alternative Treatments, Compensation and Costs   Participation in the research trial is voluntary and patient may withdraw at anytime   Contact information for study related questions    Patient verbalizes understanding of the above: Yes  Contact information for PI and IRB given to patient: Yes  Patient able to adequately summarize: the purpose of the study, the risks associated with the study, and all procedures, testing, and follow-ups associated with the study: Yes    The consent was discussed verbally with the patient and all questions were answered satisfactorily. Patient gave verbal consent for the Seroprevalence research study with an IRB approval date of 06/23/2020.      The Consent, Consent Witness and name of Clinical Research Coordinator consenting was captured and documented in [employer].    All Inclusion and Exclusion Criteria reviewed, subject meets all Inclusion criteria and does not meet any Exclusion Criteria at this time.     Patient Eligibility was confirmed.    Patient responded to survey questions.    The following biospecimen  collection procedures were collected:    -Nasopharyngeal Swab Collection  -Blood collection

## 2020-07-22 LAB — SARS-COV-2 RNA RESP QL NAA+PROBE: NOT DETECTED

## 2020-07-27 ENCOUNTER — PATIENT OUTREACH (OUTPATIENT)
Dept: ADMINISTRATIVE | Facility: HOSPITAL | Age: 55
End: 2020-07-27

## 2020-07-27 NOTE — PROGRESS NOTES
Contacted patient to follow up on overdue Colonoscopy. Patient has a scheduled colonoscopy 08/25/2020    Covid testing scheduled

## 2020-08-19 ENCOUNTER — TELEPHONE (OUTPATIENT)
Dept: PREADMISSION TESTING | Facility: HOSPITAL | Age: 55
End: 2020-08-19

## 2020-08-19 RX ORDER — PSEUDOEPHEDRINE HCL 30 MG
30 TABLET ORAL EVERY 4 HOURS PRN
COMMUNITY
End: 2022-02-09

## 2020-08-19 NOTE — TELEPHONE ENCOUNTER
Left vm message instructing patient to obtain Covid test on Saturday between 8-12pm @ [facility] site in [facility] in [place]. . Contact info given.

## 2020-08-19 NOTE — TELEPHONE ENCOUNTER
PAT call completed and patient educated on the bowel prep, clear liquid diet and procedure instructions. Medical history discussed and patient informed of arrival time 0830am and 2nd prep dose 0500. Pt will be accompanied by spouse and is made aware of limited-visitor policy, and is made aware that  is to remain during entire visit. All questions and concerns addressed. Bowel prep ordered to patient's verified pharmacy and copy of instructions sent via [employer]. Informed to take no AM medications. NPO after 2nd bowel prep. Patient verbalizes understanding of teaching and all instructions. Pre-procedure Covid testing @ [facility] site at [facility]. Patient aware.

## 2020-08-19 NOTE — PRE-PROCEDURE INSTRUCTIONS
PAT call completed and patient educated on the bowel prep, clear liquid diet and procedure instructions. Medical history discussed and patient informed of arrival time 0830am and 2nd prep dose 0500. Pt will be accompanied by spouse and is made aware of limited-visitor policy, and is made aware that  is to remain during entire visit. All questions and concerns addressed. Bowel prep ordered to patient's verified pharmacy and copy of instructions sent via [employer]. Informed to take no AM medications. NPO after 2nd bowel prep. Patient verbalizes understanding of teaching and all instructions. Pre-procedure Covid testing @ [facility] site at [facility]. Patient aware.

## 2020-08-19 NOTE — PRE ADMISSION SCREENING
Left vm message instructing patient to obtain Covid test on Saturday between 8-12pm. Contact info given.

## 2020-08-22 ENCOUNTER — LAB VISIT (OUTPATIENT)
Dept: URGENT CARE | Facility: CLINIC | Age: 55
End: 2020-08-22
Payer: COMMERCIAL

## 2020-08-22 VITALS — OXYGEN SATURATION: 98 % | TEMPERATURE: 98 F | HEART RATE: 89 BPM

## 2020-08-22 DIAGNOSIS — Z03.818 ENCOUNTER FOR OBSERVATION FOR SUSPECTED EXPOSURE TO OTHER BIOLOGICAL AGENTS RULED OUT: ICD-10-CM

## 2020-08-22 PROCEDURE — U0003 INFECTIOUS AGENT DETECTION BY NUCLEIC ACID (DNA OR RNA); SEVERE ACUTE RESPIRATORY SYNDROME CORONAVIRUS 2 (SARS-COV-2) (CORONAVIRUS DISEASE [COVID-19]), AMPLIFIED PROBE TECHNIQUE, MAKING USE OF HIGH THROUGHPUT TECHNOLOGIES AS DESCRIBED BY CMS-2020-01-R: HCPCS

## 2020-08-23 ENCOUNTER — TELEPHONE (OUTPATIENT)
Dept: URGENT CARE | Facility: CLINIC | Age: 55
End: 2020-08-23

## 2020-08-23 LAB — SARS-COV-2 RNA RESP QL NAA+PROBE: NOT DETECTED

## 2020-08-24 ENCOUNTER — ANESTHESIA EVENT (OUTPATIENT)
Dept: ENDOSCOPY | Facility: HOSPITAL | Age: 55
End: 2020-08-24
Payer: COMMERCIAL

## 2020-08-24 PROBLEM — Z12.11 ENCOUNTER FOR SCREENING COLONOSCOPY: Status: ACTIVE | Noted: 2020-08-24

## 2020-08-25 ENCOUNTER — ANESTHESIA (OUTPATIENT)
Dept: ENDOSCOPY | Facility: HOSPITAL | Age: 55
End: 2020-08-25
Payer: COMMERCIAL

## 2020-08-25 ENCOUNTER — HOSPITAL ENCOUNTER (OUTPATIENT)
Facility: HOSPITAL | Age: 55
Discharge: HOME | End: 2020-08-25
Attending: INTERNAL MEDICINE | Admitting: INTERNAL MEDICINE
Payer: COMMERCIAL

## 2020-08-25 VITALS
SYSTOLIC BLOOD PRESSURE: 115 MMHG | OXYGEN SATURATION: 100 % | HEIGHT: 64 IN | TEMPERATURE: 98 F | DIASTOLIC BLOOD PRESSURE: 67 MMHG | WEIGHT: 145.94 LBS | RESPIRATION RATE: 19 BRPM | HEART RATE: 64 BPM | BODY MASS INDEX: 24.92 KG/M2

## 2020-08-25 DIAGNOSIS — K63.89 COLONIC MASS: Primary | ICD-10-CM

## 2020-08-25 DIAGNOSIS — R19.5 POSITIVE FIT (FECAL IMMUNOCHEMICAL TEST): ICD-10-CM

## 2020-08-25 DIAGNOSIS — Z12.11 ENCOUNTER FOR SCREENING COLONOSCOPY: ICD-10-CM

## 2020-08-25 PROCEDURE — D9220A PRA ANESTHESIA: ICD-10-PCS | Mod: ANES,,, | Performed by: ANESTHESIOLOGY

## 2020-08-25 PROCEDURE — 63600175 PHARM REV CODE 636 W HCPCS: Performed by: INTERNAL MEDICINE

## 2020-08-25 PROCEDURE — 27201028 HC NEEDLE, SCLERO: Performed by: INTERNAL MEDICINE

## 2020-08-25 PROCEDURE — 27201012 HC FORCEPS, HOT/COLD, DISP: Performed by: INTERNAL MEDICINE

## 2020-08-25 PROCEDURE — D9220A PRA ANESTHESIA: Mod: CRNA,,, | Performed by: NURSE ANESTHETIST, CERTIFIED REGISTERED

## 2020-08-25 PROCEDURE — 45380 COLONOSCOPY AND BIOPSY: CPT | Mod: ,,, | Performed by: INTERNAL MEDICINE

## 2020-08-25 PROCEDURE — 88305 TISSUE EXAM BY PATHOLOGIST: CPT | Performed by: PATHOLOGY

## 2020-08-25 PROCEDURE — 45380 COLONOSCOPY AND BIOPSY: CPT | Performed by: INTERNAL MEDICINE

## 2020-08-25 PROCEDURE — 45380 PR COLONOSCOPY,BIOPSY: ICD-10-PCS | Mod: ,,, | Performed by: INTERNAL MEDICINE

## 2020-08-25 PROCEDURE — 45381 COLONOSCOPY SUBMUCOUS NJX: CPT | Performed by: INTERNAL MEDICINE

## 2020-08-25 PROCEDURE — 63600175 PHARM REV CODE 636 W HCPCS: Performed by: NURSE ANESTHETIST, CERTIFIED REGISTERED

## 2020-08-25 PROCEDURE — 37000009 HC ANESTHESIA EA ADD 15 MINS: Performed by: INTERNAL MEDICINE

## 2020-08-25 PROCEDURE — D9220A PRA ANESTHESIA: Mod: ANES,,, | Performed by: ANESTHESIOLOGY

## 2020-08-25 PROCEDURE — 37000008 HC ANESTHESIA 1ST 15 MINUTES: Performed by: INTERNAL MEDICINE

## 2020-08-25 PROCEDURE — 88305 TISSUE EXAM BY PATHOLOGIST: CPT | Mod: 26,,, | Performed by: PATHOLOGY

## 2020-08-25 PROCEDURE — 88305 TISSUE EXAM BY PATHOLOGIST: ICD-10-PCS | Mod: 26,,, | Performed by: PATHOLOGY

## 2020-08-25 PROCEDURE — 27202363 HC INJECTION AGENT, SUBMUCOSAL, ANY: Performed by: INTERNAL MEDICINE

## 2020-08-25 PROCEDURE — 45381 PR COLONOSCPY,FLEX,W/DIR SUBMUC INJECT: ICD-10-PCS | Mod: 51,,, | Performed by: INTERNAL MEDICINE

## 2020-08-25 PROCEDURE — D9220A PRA ANESTHESIA: ICD-10-PCS | Mod: CRNA,,, | Performed by: NURSE ANESTHETIST, CERTIFIED REGISTERED

## 2020-08-25 PROCEDURE — 45381 COLONOSCOPY SUBMUCOUS NJX: CPT | Mod: 51,,, | Performed by: INTERNAL MEDICINE

## 2020-08-25 RX ORDER — SODIUM CHLORIDE 0.9 % (FLUSH) 0.9 %
10 SYRINGE (ML) INJECTION
Status: DISCONTINUED | OUTPATIENT
Start: 2020-08-25 | End: 2020-08-25 | Stop reason: HOSPADM

## 2020-08-25 RX ORDER — ONDANSETRON 2 MG/ML
4 INJECTION INTRAMUSCULAR; INTRAVENOUS DAILY PRN
Status: DISCONTINUED | OUTPATIENT
Start: 2020-08-25 | End: 2020-08-25 | Stop reason: HOSPADM

## 2020-08-25 RX ORDER — LIDOCAINE HYDROCHLORIDE 20 MG/ML
INJECTION INTRAVENOUS
Status: DISCONTINUED | OUTPATIENT
Start: 2020-08-25 | End: 2020-08-25

## 2020-08-25 RX ORDER — SODIUM CHLORIDE, SODIUM LACTATE, POTASSIUM CHLORIDE, CALCIUM CHLORIDE 600; 310; 30; 20 MG/100ML; MG/100ML; MG/100ML; MG/100ML
INJECTION, SOLUTION INTRAVENOUS CONTINUOUS
Status: DISCONTINUED | OUTPATIENT
Start: 2020-08-25 | End: 2020-08-25 | Stop reason: HOSPADM

## 2020-08-25 RX ORDER — PROPOFOL 10 MG/ML
VIAL (ML) INTRAVENOUS
Status: DISCONTINUED | OUTPATIENT
Start: 2020-08-25 | End: 2020-08-25

## 2020-08-25 RX ADMIN — Medication 20 MG: at 10:08

## 2020-08-25 RX ADMIN — PROPOFOL 100 MG: 10 INJECTION, EMULSION INTRAVENOUS at 10:08

## 2020-08-25 RX ADMIN — PROPOFOL 50 MG: 10 INJECTION, EMULSION INTRAVENOUS at 10:08

## 2020-08-25 RX ADMIN — SODIUM CHLORIDE, SODIUM LACTATE, POTASSIUM CHLORIDE, AND CALCIUM CHLORIDE: 600; 310; 30; 20 INJECTION, SOLUTION INTRAVENOUS at 09:08

## 2020-08-25 NOTE — H&P
Short Stay Endoscopy History and Physical    PCP - Dhrvu Nguyen MD    Procedure - Colonoscopy  ASA - 2  Mallampati - per anesthesia  History of Anesthesia problems - no  Family history Anesthesia problems -  no     HPI:  This is a 55 y.o. female here for evaluation of :   Active Hospital Problems    Diagnosis  POA    *Positive FIT (fecal immunochemical test) [R19.5]  Clinically Undetermined      Resolved Hospital Problems   No resolved problems to display.         Health Maintenance       Date Due Completion Date    HIV Screening 01/10/1980 ---    TETANUS VACCINE 01/10/1983 ---    Cervical Cancer Screening 01/10/1986 ---    Shingles Vaccine (1 of 2) 01/10/2015 ---    DEXA SCAN 03/10/2020 3/10/2017    Colorectal Cancer Screening 06/26/2020 6/25/2020    Influenza Vaccine (1) 09/01/2020 10/11/2019    Mammogram 03/26/2021 3/26/2019    Lipid Panel 06/10/2025 6/10/2020          Screening - yes  History of polyps - no  Diarrhea - no  Anemia - no  Blood in stools - no  Abdominal pain - no  Family History of Colon Cancer- no  Other - no    ROS:  CONSTITUTIONAL: Denies weight change,  fatigue, fevers, chills, night sweats.  CARDIOVASCULAR: Denies chest pain, shortness of breath, orthopnea and edema.  RESPIRATORY: Denies cough, hemoptysis, dyspnea, and wheezing.  GI: See HPI.    Medical History:   Past Medical History:   Diagnosis Date    PONV (postoperative nausea and vomiting)        Surgical History:   Past Surgical History:   Procedure Laterality Date    OVARIAN CYST REMOVAL      WISDOM TOOTH EXTRACTION         Family History:   Family History   Problem Relation Age of Onset    Fibromyalgia Sister     Diabetes Neg Hx     Heart disease Neg Hx        Social History:   Social History     Tobacco Use    Smoking status: Never Smoker    Smokeless tobacco: Never Used   Substance Use Topics    Alcohol use: Yes     Frequency: Monthly or less     Drinks per session: 1 or 2     Binge frequency: Never     Comment: Occ  use    Drug use: No       Allergies:   Review of patient's allergies indicates:  No Known Allergies    Medications:   No current facility-administered medications on file prior to encounter.      Current Outpatient Medications on File Prior to Encounter   Medication Sig Dispense Refill    multivitamin with minerals tablet Take 1 tablet by mouth once daily.      pseudoephedrine (SUDAFED) 30 MG tablet Take 30 mg by mouth every 4 (four) hours as needed for Congestion.      soy isofla/blk cohosh/mag bark (ESTROVEN ORAL) Take 1 tablet by mouth once daily.      tiZANidine (ZANAFLEX) 4 MG tablet Take 1 tablet (4 mg total) by mouth 3 (three) times daily as needed. 30 tablet 3    meloxicam (MOBIC) 15 MG tablet Take 1 tablet (15 mg total) by mouth daily as needed for Pain. 30 tablet 3       Physical Exam:  Vital Signs:   Vitals:    08/25/20 0917   BP: 135/79   Pulse: 79   Resp: 16   Temp: 98.1 °F (36.7 °C)     General Appearance: Well appearing in no acute distress  ENT: OP clear  Chest: CTA B  CV: RRR, no m/r/g  Abd: s/nt/nd/nabs  Ext: no edema    Labs:Reviewed    IMP:  Active Hospital Problems    Diagnosis  POA    *Positive FIT (fecal immunochemical test) [R19.5]  Clinically Undetermined      Resolved Hospital Problems   No resolved problems to display.         Plan:   I have explained the risks and benefits of colonoscopy to the patient including but not limited to bleeding, perforation, infection, and death. The patient wishes to proceed.

## 2020-08-25 NOTE — PLAN OF CARE
Pt is AAOx4. VSS. NAD. Denies pain or nausea. DIscharge instructions given, verbalized understanding. Discharged home with family.

## 2020-08-25 NOTE — DISCHARGE SUMMARY
Endoscopy Discharge Summary      Admit Date: 8/25/2020    Discharge Date and Time:  8/25/2020 10:28 AM    Attending Physician: Rianna Dillon MD     Discharge Physician: Rianna Dillon MD     Principal Admitting Diagnoses: Positive FIT (fecal immunochemical test)         Discharge Diagnosis: The primary encounter diagnosis was Colonic mass. Diagnoses of Encounter for screening colonoscopy and Positive FIT (fecal immunochemical test) were also pertinent to this visit.     Discharged Condition: Good    Indication for Admission: Positive FIT (fecal immunochemical test)     Hospital Course: Patient was admitted for an inpatient procedure and tolerated the procedure well with no complications.    Significant Diagnostic Studies: Colonoscopy with biopsy and tattoo    Pathology (if any): Rectosigmoid mass biopsies    Estimated Blood Loss: 1 ml.    Discussed with: patient.    Disposition: Home.    Follow Up/Patient Instructions:   Current Discharge Medication List      CONTINUE these medications which have NOT CHANGED    Details   multivitamin with minerals tablet Take 1 tablet by mouth once daily.      pseudoephedrine (SUDAFED) 30 MG tablet Take 30 mg by mouth every 4 (four) hours as needed for Congestion.      soy isofla/blk cohosh/mag bark (ESTROVEN ORAL) Take 1 tablet by mouth once daily.      tiZANidine (ZANAFLEX) 4 MG tablet Take 1 tablet (4 mg total) by mouth 3 (three) times daily as needed.  Qty: 30 tablet, Refills: 3    Comments: Please consider 90 day supplies to promote better adherence      meloxicam (MOBIC) 15 MG tablet Take 1 tablet (15 mg total) by mouth daily as needed for Pain.  Qty: 30 tablet, Refills: 3             Discharge Procedure Orders   Ambulatory referral/consult to Colorectal Surgery   Standing Status: Future   Referral Priority: Urgent Referral Type: Consultation   Referral Reason: Specialty Services Required   Referred to Provider: MILLY BARKER Requested Specialty: Colon and  Rectal Surgery   Number of Visits Requested: 1           Rianna Dillon MD  Gastroenterology and Hepatology

## 2020-08-25 NOTE — [employer] PATIENT INSTRUCTIONS
Discharge Summary/Instructions after an Endoscopic Procedure  Patient Name: Jhoana Fuentes  Patient MRN: [id number]  Patient YOB: 1965  Tuesday, August 25, 2020  Rianna Dillon MD  RESTRICTIONS:  During your procedure today, you received medications for sedation.  These   medications may affect your judgment, balance and coordination.  Therefore,   for 24 hours, you have the following restrictions:   - DO NOT drive a car, operate machinery, make legal/financial decisions,   sign important papers or drink alcohol.    ACTIVITY:  Today: no heavy lifting, straining or running due to procedural   sedation/anesthesia.  The following day: return to full activity including work.  DIET:  Eat and drink normally unless instructed otherwise.     TREATMENT FOR COMMON SIDE EFFECTS:  - Mild abdominal pain, nausea, belching, bloating or excessive gas:  rest,   eat lightly and use a heating pad.  - Sore Throat: treat with throat lozenges and/or gargle with warm salt   water.  - Because air was used during the procedure, expelling large amounts of air   from your rectum or belching is normal.  - If a bowel prep was taken, you may not have a bowel movement for 1-3 days.    This is normal.  SYMPTOMS TO WATCH FOR AND REPORT TO YOUR PHYSICIAN:  1. Abdominal pain or bloating, other than gas cramps.  2. Chest pain.  3. Back pain.  4. Signs of infection such as: chills or fever occurring within 24 hours   after the procedure.  5. Rectal bleeding, which would show as bright red, maroon, or black stools.   (A tablespoon of blood from the rectum is not serious, especially if   hemorrhoids are present.)  6. Vomiting.  7. Weakness or dizziness.  GO DIRECTLY TO THE NEAREST EMERGENCY ROOM IF YOU HAVE ANY OF THE FOLLOWING:      Difficulty breathing              Chills and/or fever over 101 F   Persistent vomiting and/or vomiting blood   Severe abdominal pain   Severe chest pain   Black, tarry stools   Bleeding- more than one  tablespoon   Any other symptom or condition that you feel may need urgent attention  Your doctor recommends these additional instructions:  If any biopsies were taken, your doctors clinic will contact you in 1 to 2   weeks with any results.  - Discharge patient to home (via wheelchair).   - Resume previous diet.   - Continue present medications.   - Await pathology results.   - Refer to a colo-rectal surgeon at the next available appointment.   - Referral to oncology pending pathology results  - Repeat colonoscopy for surveillance based on pathology results.  For questions, problems or results please call your physician Rianna Dillon MD at Work:  [phone]  If you have any questions about the above instructions, call the GI   department at [phone] or call the endoscopy unit at [phone]   from 7am until 3 pm.  [facility] [place], EMERGENCY ROOM PHONE NUMBER:   [phone]  IF A COMPLICATION OR EMERGENCY SITUATION ARISES AND YOU ARE UNABLE TO REACH   YOUR PHYSICIAN - GO DIRECTLY TO THE EMERGENCY ROOM.  I have read or have had read to me these discharge instructions for my   procedure and have received a written copy.  I understand these   instructions and will follow-up with my physician if I have any questions.     __________________________________       _____________________________________  Nurse Signature                                          Patient/Designated   Responsible Party Signature  MD Rianna Cruz MD  8/25/2020 10:26:38 AM  [employer]

## 2020-08-25 NOTE — ANESTHESIA POSTPROCEDURE EVALUATION
Anesthesia Post Evaluation    Patient: Jhoana Sierra    Procedure(s) Performed: Procedure(s) (LRB):  COLONOSCOPY (N/A)    Final Anesthesia Type: general    Patient location during evaluation: PACU  Patient participation: Yes- Able to Participate  Level of consciousness: awake and alert and oriented  Post-procedure vital signs: reviewed and stable  Pain management: adequate  Airway patency: patent    PONV status at discharge: No PONV  Anesthetic complications: no      Cardiovascular status: blood pressure returned to baseline, stable and hemodynamically stable  Respiratory status: unassisted  Hydration status: euvolemic  Follow-up not needed.          Vitals Value Taken Time   /67 08/25/20 1055   Temp 36.8 °C (98.2 °F) 08/25/20 1055   Pulse 64 08/25/20 1055   Resp 19 08/25/20 1055   SpO2 100 % 08/25/20 1055         Event Time   Out of Recovery 10:57:00         Pain/Jackson Score: Jackson Score: 10 (8/25/2020 10:55 AM)

## 2020-08-25 NOTE — TRANSFER OF CARE
"Anesthesia Transfer of Care Note    Patient: Jhoana Sierra    Procedure(s) Performed: Procedure(s) (LRB):  COLONOSCOPY (N/A)    Patient location: PACU    Anesthesia Type: general    Transport from OR: Transported from OR on room air with adequate spontaneous ventilation    Post pain: adequate analgesia    Post assessment: no apparent anesthetic complications and tolerated procedure well    Post vital signs: stable    Level of consciousness: awake    Nausea/Vomiting: no nausea/vomiting    Complications: none    Transfer of care protocol was followed      Last vitals:   Visit Vitals  /79   Pulse 79   Temp 36.7 °C (98.1 °F) (Temporal)   Resp 16   Ht 5' 4" (1.626 m)   Wt 66.2 kg (145 lb 15.1 oz)   LMP 08/25/2017   SpO2 100%   Breastfeeding No   BMI 25.05 kg/m²     "

## 2020-08-25 NOTE — DISCHARGE INSTRUCTIONS
Colonoscopy     A camera attached to a flexible tube with a viewing lens is used to take video pictures.     Colonoscopy is a test to view the inside of your lower digestive tract (colon and rectum). Sometimes it can show the last part of the small intestine (ileum). During the test, small pieces of tissue may be removed for testing. This is called a biopsy. Small growths, such as polyps, may also be removed.   Why is colonoscopy done?  The test is done to help look for colon cancer. And it can help find the source of abdominal pain, bleeding, and changes in bowel habits. It may be needed once a year, depending on factors such as your:  · Age  · Health history  · Family health history  · Symptoms  · Results from any prior colonoscopy  Risks and possible complications  These include:  · Bleeding               · A puncture or tear in the colon   · Risks of anesthesia  · A cancer lesion not being seen  Getting ready   To prepare for the test:  · Talk with your healthcare provider about the risks of the test (see below). Also ask your healthcare provider about alternatives to the test.  · Tell your healthcare provider about any medicines you take. Also tell him or her about any health conditions you may have.  · Make sure your rectum and colon are empty for the test. Follow the diet and bowel prep instructions exactly. If you dont, the test may need to be rescheduled.  · Plan for a friend or family member to drive you home after the test.     Colonoscopy provides an inside view of the entire colon.     You may discuss the results with your doctor right away or at a future visit.  During the test   The test is usually done in the hospital on an outpatient basis. This means you go home the same day. The procedure takes about 30 minutes. During that time:  · You are given relaxing (sedating) medicine through an IV line. You may be drowsy, or fully asleep.  · The healthcare provider will first give you a physical exam to  check for anal and rectal problems.  · Then the anus is lubricated and the scope inserted.  · If you are awake, you may have a feeling similar to needing to have a bowel movement. You may also feel pressure as air is pumped into the colon. Its OK to pass gas during the procedure.  · Biopsy, polyp removal, or other treatments may be done during the test.  After the test   You may have gas right after the test. It can help to try to pass it to help prevent later bloating. Your healthcare provider may discuss the results with you right away. Or you may need to schedule a follow-up visit to talk about the results. After the test, you can go back to your normal eating and other activities. You may be tired from the sedation and need to rest for a few hours.  Date Last Reviewed: 11/1/2016 © 2000-2017 [employer]. [address] [place] [address], [place] [address]. All rights reserved. This information is not intended as a substitute for professional medical care. Always follow your healthcare professional's instructions.

## 2020-09-02 LAB
FINAL PATHOLOGIC DIAGNOSIS: NORMAL
GROSS: NORMAL

## 2020-09-03 ENCOUNTER — TELEPHONE (OUTPATIENT)
Dept: HEMATOLOGY/ONCOLOGY | Facility: CLINIC | Age: 55
End: 2020-09-03

## 2020-09-03 DIAGNOSIS — C18.9 COLON ADENOCARCINOMA: Primary | ICD-10-CM

## 2020-09-03 NOTE — TELEPHONE ENCOUNTER
Spoke with patient over the phone today in regards to referral to oncology from Dr. Dillon.  Pt is in agreement to see Dr Stuart 9/9/20 at the cancer center and then go over to the [place] location to see Dr. Hannah .  She is in agreement with this plan . She has my direct call back number for any further questions or concerns.

## 2020-09-06 ENCOUNTER — PATIENT OUTREACH (OUTPATIENT)
Dept: ADMINISTRATIVE | Facility: OTHER | Age: 55
End: 2020-09-06

## 2020-09-08 DIAGNOSIS — C18.9 COLON ADENOCARCINOMA: Primary | ICD-10-CM

## 2020-09-09 ENCOUNTER — TELEPHONE (OUTPATIENT)
Dept: HEMATOLOGY/ONCOLOGY | Facility: CLINIC | Age: 55
End: 2020-09-09

## 2020-09-09 ENCOUNTER — HOSPITAL ENCOUNTER (OUTPATIENT)
Dept: CARDIOLOGY | Facility: HOSPITAL | Age: 55
Discharge: HOME | End: 2020-09-09
Attending: COLON & RECTAL SURGERY
Payer: COMMERCIAL

## 2020-09-09 ENCOUNTER — LAB VISIT (OUTPATIENT)
Dept: LAB | Facility: HOSPITAL | Age: 55
End: 2020-09-09
Attending: COLON & RECTAL SURGERY
Payer: COMMERCIAL

## 2020-09-09 ENCOUNTER — OFFICE VISIT (OUTPATIENT)
Dept: HEMATOLOGY/ONCOLOGY | Facility: CLINIC | Age: 55
End: 2020-09-09
Payer: COMMERCIAL

## 2020-09-09 ENCOUNTER — OFFICE VISIT (OUTPATIENT)
Dept: SURGERY | Facility: CLINIC | Age: 55
End: 2020-09-09
Payer: COMMERCIAL

## 2020-09-09 ENCOUNTER — HOSPITAL ENCOUNTER (OUTPATIENT)
Dept: RADIOLOGY | Facility: HOSPITAL | Age: 55
Discharge: HOME | End: 2020-09-09
Attending: COLON & RECTAL SURGERY
Payer: COMMERCIAL

## 2020-09-09 VITALS
HEART RATE: 71 BPM | OXYGEN SATURATION: 98 % | HEIGHT: 64 IN | SYSTOLIC BLOOD PRESSURE: 132 MMHG | BODY MASS INDEX: 25.41 KG/M2 | WEIGHT: 148.81 LBS | RESPIRATION RATE: 18 BRPM | DIASTOLIC BLOOD PRESSURE: 78 MMHG | TEMPERATURE: 98 F

## 2020-09-09 VITALS
BODY MASS INDEX: 25.62 KG/M2 | WEIGHT: 149.25 LBS | HEART RATE: 83 BPM | SYSTOLIC BLOOD PRESSURE: 127 MMHG | TEMPERATURE: 99 F | DIASTOLIC BLOOD PRESSURE: 70 MMHG | OXYGEN SATURATION: 98 %

## 2020-09-09 DIAGNOSIS — C18.9 COLON ADENOCARCINOMA: ICD-10-CM

## 2020-09-09 DIAGNOSIS — K63.89 COLONIC MASS: ICD-10-CM

## 2020-09-09 DIAGNOSIS — Z01.818 PRE-OP TESTING: Primary | ICD-10-CM

## 2020-09-09 DIAGNOSIS — C18.9 COLON ADENOCARCINOMA: Primary | ICD-10-CM

## 2020-09-09 DIAGNOSIS — C18.9 ADENOCARCINOMA OF COLON: ICD-10-CM

## 2020-09-09 DIAGNOSIS — Z01.818 PRE-OP TESTING: ICD-10-CM

## 2020-09-09 LAB
ALBUMIN SERPL BCP-MCNC: 4.3 G/DL (ref 3.5–5.2)
ALP SERPL-CCNC: 79 U/L (ref 55–135)
ALT SERPL W/O P-5'-P-CCNC: 14 U/L (ref 10–44)
ANION GAP SERPL CALC-SCNC: 6 MMOL/L (ref 8–16)
AST SERPL-CCNC: 16 U/L (ref 10–40)
BASOPHILS # BLD AUTO: 0.02 K/UL (ref 0–0.2)
BASOPHILS NFR BLD: 0.5 % (ref 0–1.9)
BILIRUB SERPL-MCNC: 0.5 MG/DL (ref 0.1–1)
BUN SERPL-MCNC: 14 MG/DL (ref 6–20)
CALCIUM SERPL-MCNC: 9.3 MG/DL (ref 8.7–10.5)
CEA SERPL-MCNC: 1.8 NG/ML (ref 0–5)
CHLORIDE SERPL-SCNC: 106 MMOL/L (ref 95–110)
CO2 SERPL-SCNC: 29 MMOL/L (ref 23–29)
CREAT SERPL-MCNC: 0.8 MG/DL (ref 0.5–1.4)
DIFFERENTIAL METHOD: ABNORMAL
EOSINOPHIL # BLD AUTO: 0.1 K/UL (ref 0–0.5)
EOSINOPHIL NFR BLD: 1.4 % (ref 0–8)
ERYTHROCYTE [DISTWIDTH] IN BLOOD BY AUTOMATED COUNT: 12.5 % (ref 11.5–14.5)
EST. GFR  (AFRICAN AMERICAN): >60 ML/MIN/1.73 M^2
EST. GFR  (NON AFRICAN AMERICAN): >60 ML/MIN/1.73 M^2
GLUCOSE SERPL-MCNC: 94 MG/DL (ref 70–110)
HCT VFR BLD AUTO: 41.2 % (ref 37–48.5)
HGB BLD-MCNC: 12.7 G/DL (ref 12–16)
IMM GRANULOCYTES # BLD AUTO: 0.01 K/UL (ref 0–0.04)
IMM GRANULOCYTES NFR BLD AUTO: 0.2 % (ref 0–0.5)
LYMPHOCYTES # BLD AUTO: 1.2 K/UL (ref 1–4.8)
LYMPHOCYTES NFR BLD: 27.6 % (ref 18–48)
MCH RBC QN AUTO: 29.8 PG (ref 27–31)
MCHC RBC AUTO-ENTMCNC: 30.8 G/DL (ref 32–36)
MCV RBC AUTO: 97 FL (ref 82–98)
MONOCYTES # BLD AUTO: 0.3 K/UL (ref 0.3–1)
MONOCYTES NFR BLD: 6.1 % (ref 4–15)
NEUTROPHILS # BLD AUTO: 2.7 K/UL (ref 1.8–7.7)
NEUTROPHILS NFR BLD: 64.2 % (ref 38–73)
NRBC BLD-RTO: 0 /100 WBC
PLATELET # BLD AUTO: 247 K/UL (ref 150–350)
PMV BLD AUTO: 9.9 FL (ref 9.2–12.9)
POTASSIUM SERPL-SCNC: 4.4 MMOL/L (ref 3.5–5.1)
PROT SERPL-MCNC: 7.4 G/DL (ref 6–8.4)
RBC # BLD AUTO: 4.26 M/UL (ref 4–5.4)
SODIUM SERPL-SCNC: 141 MMOL/L (ref 136–145)
WBC # BLD AUTO: 4.27 K/UL (ref 3.9–12.7)

## 2020-09-09 PROCEDURE — 80053 COMPREHEN METABOLIC PANEL: CPT

## 2020-09-09 PROCEDURE — 74177 CT ABD & PELVIS W/CONTRAST: CPT | Mod: TC

## 2020-09-09 PROCEDURE — 74177 CT CHEST ABDOMEN PELVIS WITH CONTRAST (XPD): ICD-10-PCS | Mod: 26,,, | Performed by: RADIOLOGY

## 2020-09-09 PROCEDURE — 25500020 PHARM REV CODE 255: Performed by: COLON & RECTAL SURGERY

## 2020-09-09 PROCEDURE — 99245 PR OFFICE CONSULTATION,LEVEL V: ICD-10-PCS | Mod: S$GLB,,, | Performed by: INTERNAL MEDICINE

## 2020-09-09 PROCEDURE — 36415 COLL VENOUS BLD VENIPUNCTURE: CPT

## 2020-09-09 PROCEDURE — 99999 PR PBB SHADOW E&M-EST. PATIENT-LVL IV: CPT | Mod: PBBFAC,,, | Performed by: INTERNAL MEDICINE

## 2020-09-09 PROCEDURE — 71260 CT CHEST ABDOMEN PELVIS WITH CONTRAST (XPD): ICD-10-PCS | Mod: 26,,, | Performed by: RADIOLOGY

## 2020-09-09 PROCEDURE — 74177 CT ABD & PELVIS W/CONTRAST: CPT | Mod: 26,,, | Performed by: RADIOLOGY

## 2020-09-09 PROCEDURE — 93005 ELECTROCARDIOGRAM TRACING: CPT

## 2020-09-09 PROCEDURE — 99245 OFF/OP CONSLTJ NEW/EST HI 55: CPT | Mod: S$GLB,,, | Performed by: INTERNAL MEDICINE

## 2020-09-09 PROCEDURE — 93010 ELECTROCARDIOGRAM REPORT: CPT | Mod: ,,, | Performed by: INTERNAL MEDICINE

## 2020-09-09 PROCEDURE — 99244 PR OFFICE CONSULTATION,LEVEL IV: ICD-10-PCS | Mod: S$GLB,,, | Performed by: COLON & RECTAL SURGERY

## 2020-09-09 PROCEDURE — 82378 CARCINOEMBRYONIC ANTIGEN: CPT

## 2020-09-09 PROCEDURE — 99999 PR PBB SHADOW E&M-EST. PATIENT-LVL IV: ICD-10-PCS | Mod: PBBFAC,,, | Performed by: INTERNAL MEDICINE

## 2020-09-09 PROCEDURE — 71260 CT THORAX DX C+: CPT | Mod: 26,,, | Performed by: RADIOLOGY

## 2020-09-09 PROCEDURE — 99999 PR PBB SHADOW E&M-EST. PATIENT-LVL V: ICD-10-PCS | Mod: PBBFAC,,, | Performed by: COLON & RECTAL SURGERY

## 2020-09-09 PROCEDURE — 99999 PR PBB SHADOW E&M-EST. PATIENT-LVL V: CPT | Mod: PBBFAC,,, | Performed by: COLON & RECTAL SURGERY

## 2020-09-09 PROCEDURE — 93010 EKG 12-LEAD: ICD-10-PCS | Mod: ,,, | Performed by: INTERNAL MEDICINE

## 2020-09-09 PROCEDURE — 85025 COMPLETE CBC W/AUTO DIFF WBC: CPT

## 2020-09-09 PROCEDURE — 99244 OFF/OP CNSLTJ NEW/EST MOD 40: CPT | Mod: S$GLB,,, | Performed by: COLON & RECTAL SURGERY

## 2020-09-09 RX ORDER — SODIUM CHLORIDE, SODIUM LACTATE, POTASSIUM CHLORIDE, CALCIUM CHLORIDE 600; 310; 30; 20 MG/100ML; MG/100ML; MG/100ML; MG/100ML
INJECTION, SOLUTION INTRAVENOUS CONTINUOUS
Status: CANCELLED | OUTPATIENT
Start: 2020-09-09

## 2020-09-09 RX ORDER — ACETAMINOPHEN 325 MG/1
1000 TABLET ORAL ONCE
Status: CANCELLED | OUTPATIENT
Start: 2020-09-09 | End: 2020-09-09

## 2020-09-09 RX ORDER — POLYETHYLENE GLYCOL 3350 17 G/17G
238 POWDER, FOR SOLUTION ORAL DAILY
Qty: 1 BOTTLE | Refills: 0 | Status: ON HOLD | OUTPATIENT
Start: 2020-09-09 | End: 2020-10-08

## 2020-09-09 RX ORDER — GABAPENTIN 100 MG/1
800 CAPSULE ORAL
Status: CANCELLED | OUTPATIENT
Start: 2020-09-09 | End: 2020-09-09

## 2020-09-09 RX ORDER — METRONIDAZOLE 500 MG/1
500 TABLET ORAL 3 TIMES DAILY
Qty: 3 TABLET | Refills: 0 | Status: ON HOLD | OUTPATIENT
Start: 2020-09-09 | End: 2020-10-08

## 2020-09-09 RX ORDER — METRONIDAZOLE 500 MG/100ML
500 INJECTION, SOLUTION INTRAVENOUS
Status: CANCELLED | OUTPATIENT
Start: 2020-09-09

## 2020-09-09 RX ORDER — HEPARIN SODIUM 5000 [USP'U]/ML
5000 INJECTION, SOLUTION INTRAVENOUS; SUBCUTANEOUS
Status: CANCELLED | OUTPATIENT
Start: 2020-09-09 | End: 2020-09-10

## 2020-09-09 RX ORDER — CELECOXIB 100 MG/1
400 CAPSULE ORAL
Status: CANCELLED | OUTPATIENT
Start: 2020-09-09 | End: 2020-09-09

## 2020-09-09 RX ORDER — NEOMYCIN SULFATE 500 MG/1
1000 TABLET ORAL 3 TIMES DAILY
Qty: 6 TABLET | Refills: 0 | Status: ON HOLD | OUTPATIENT
Start: 2020-09-09 | End: 2020-10-08

## 2020-09-09 RX ORDER — ONDANSETRON 2 MG/ML
4 INJECTION INTRAMUSCULAR; INTRAVENOUS EVERY 12 HOURS PRN
Status: CANCELLED | OUTPATIENT
Start: 2020-09-09

## 2020-09-09 RX ADMIN — IOHEXOL 30 ML: 350 INJECTION, SOLUTION INTRAVENOUS at 09:09

## 2020-09-09 RX ADMIN — IOHEXOL 100 ML: 350 INJECTION, SOLUTION INTRAVENOUS at 10:09

## 2020-09-09 NOTE — LETTER
September 9, 2020      Rianna Dillon MD  [address] [place] [address]            [facility] - Hematology Oncology  [address]  [place] [address]  Phone: [phone]  Fax: [phone]          Patient: Jhoana Sierra   MR Number: [id number]   YOB: 1965   Date of Visit: 9/9/2020       Dear Dr. Rianna Dillon:    Thank you for referring Jhoana Sierra to me for evaluation. Attached you will find relevant portions of my assessment and plan of care.    If you have questions, please do not hesitate to call me. I look forward to following Jhoana Sierra along with you.    Sincerely,    Duc Stuart MD    Enclosure  CC:  No Recipients    If you would like to receive this communication electronically, please contact [email or web address] or [phone] to request more information on [facility] [address] [employer] access.    For providers and/or their staff who would like to refer a patient to [facility], please contact us through our one-stop-shop provider referral line, [facility], at [phone].    If you feel you have received this communication in error or would no longer like to receive these types of communications, please e-mail [email or web address]

## 2020-09-09 NOTE — TELEPHONE ENCOUNTER
Pt called in at 1044am to say that she was going to be running late for her appt with DR. Sturat for 1120 as she is still at the [place] location getting a CT scan that surgery called her yesterday at 5 pm about.  She will head over to the cancer center once she is done at the [facility].  Dr. Dejesus staff notified.

## 2020-09-09 NOTE — ASSESSMENT & PLAN NOTE
Adenocarcinoma of rectosigmoid colon; limited pathology.  CT scan of chest abdomen pelvis has been ordered to rule out distant disease which could potentially be biopsied for complete histologic diagnosis.  We will obtain CBC, CMP and CEA levels.    Has upcoming appointment with general surgery on 09/09/2020 to discuss possible resection.  Will plan on seeing her back in 2 weeks with repeat labs or sooner if needed.

## 2020-09-09 NOTE — H&P (VIEW-ONLY)
History & Physical    SUBJECTIVE:     CC: rectosigmoid adenocarcinoma  Ref MD: Rianna Dillon MD    History of Present Illness:  Patient is a 55 y.o. 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  Patient had a positive fit test in June 2020 which prompted the scheduling of a colonoscopy.  This colonoscopy was performed in August 2020 and showed a mass in the rectosigmoid region with biopsies resulting in adenocarcinoma.  Patient states that she had seen intermittent blood in her stool for 3 months prior to this.  This was her 1st colonoscopy ever.  She denies any associated fever, chills, nausea, vomiting, diarrhea, constipation, weight loss or night sweats.  She has no known personal family history of colorectal cancer or colonic polyps previously.  Her only significant abdominal surgical history is an open ovarian cyst removal via Pfannenstiel incision.    Review of patient's allergies indicates:  No Known Allergies    Current Outpatient Medications   Medication Sig Dispense Refill    meloxicam (MOBIC) 15 MG tablet Take 1 tablet (15 mg total) by mouth daily as needed for Pain. 30 tablet 3    metroNIDAZOLE (FLAGYL) 500 MG tablet Take 1 tablet (500 mg total) by mouth 3 (three) times daily. Take initial dose@2pm, second dose@3pm and final dose@10pm day before surgery 3 tablet 0    multivitamin with minerals tablet Take 1 tablet by mouth once daily.      neomycin (MYCIFRADIN) 500 mg Tab Take 2 tablets (1,000 mg total) by mouth 3 (three) times daily. Take 1st dose@2pm,take 2nd dose@3pm, take last dose@10pm day before surgery 6 tablet 0    polyethylene glycol (GLYCOLAX) 17 gram/dose powder Take 238 g by mouth once daily. Mix with 2L of gatorade, drink all mixed solution starting@12pm day before surgery, finish by 10pm 1 Bottle 0    pseudoephedrine (SUDAFED) 30 MG tablet Take 30 mg by mouth every 4 (four) hours as needed for Congestion.      soy isofla/blk cohosh/mag bark (ESTROVEN ORAL) Take 1  tablet by mouth once daily.      tiZANidine (ZANAFLEX) 4 MG tablet Take 1 tablet (4 mg total) by mouth 3 (three) times daily as needed. 30 tablet 3     No current facility-administered medications for this visit.        Past Medical History:   Diagnosis Date    PONV (postoperative nausea and vomiting)      Past Surgical History:   Procedure Laterality Date    COLONOSCOPY N/A 8/25/2020    Procedure: COLONOSCOPY;  Surgeon: Rianna Dillon MD;  Location: [facility];  Service: Endoscopy;  Laterality: N/A;    OVARIAN CYST REMOVAL      WISDOM TOOTH EXTRACTION       Family History   Problem Relation Age of Onset    Fibromyalgia Sister     Diabetes Neg Hx     Heart disease Neg Hx      Social History     Tobacco Use    Smoking status: Never Smoker    Smokeless tobacco: Never Used   Substance Use Topics    Alcohol use: Yes     Frequency: Monthly or less     Drinks per session: 1 or 2     Binge frequency: Never     Comment: Occ use    Drug use: No        Review of Systems:  Review of Systems   Constitutional: Negative for activity change, appetite change, chills, fatigue, fever and unexpected weight change.   HENT: Negative for congestion, ear pain, sore throat and trouble swallowing.    Eyes: Negative for pain, redness and itching.   Respiratory: Negative for cough, shortness of breath and wheezing.    Cardiovascular: Negative for chest pain, palpitations and leg swelling.   Gastrointestinal: Positive for blood in stool. Negative for abdominal distention, abdominal pain, constipation, diarrhea, nausea, rectal pain and vomiting.   Endocrine: Negative for cold intolerance, heat intolerance and polyuria.   Genitourinary: Negative for dysuria, flank pain, frequency and hematuria.   Musculoskeletal: Negative for gait problem, joint swelling and neck pain.   Skin: Negative for color change, rash and wound.   Allergic/Immunologic: Negative for environmental allergies and immunocompromised state.   Neurological:  Negative for dizziness, speech difficulty, weakness and numbness.   Psychiatric/Behavioral: Negative for agitation, confusion and hallucinations.       OBJECTIVE:     Vital Signs (Most Recent)  Temp: 98.5 °F (36.9 °C) (09/09/20 1333)  Pulse: 83 (09/09/20 1333)  BP: 127/70 (09/09/20 1333)  SpO2: 98 % (09/09/20 1333)     67.7 kg (149 lb 4 oz)     Physical Exam:  Physical Exam  Constitutional:       Appearance: She is well-developed.   HENT:      Head: Normocephalic and atraumatic.   Eyes:      Conjunctiva/sclera: Conjunctivae normal.   Neck:      Musculoskeletal: Normal range of motion.      Thyroid: No thyromegaly.   Cardiovascular:      Rate and Rhythm: Normal rate and regular rhythm.   Pulmonary:      Effort: Pulmonary effort is normal. No respiratory distress.   Abdominal:      General: There is no distension.      Palpations: Abdomen is soft. There is no mass.      Tenderness: There is no abdominal tenderness.      Comments: Well-healed Pfannenstiel incision   Musculoskeletal: Normal range of motion.         General: No tenderness.   Skin:     General: Skin is warm and dry.      Capillary Refill: Capillary refill takes less than 2 seconds.      Findings: No rash.   Neurological:      Mental Status: She is alert and oriented to person, place, and time.         Laboratory  Lab Results   Component Value Date    WBC 4.57 06/10/2020    HGB 12.8 06/10/2020    HCT 40.3 06/10/2020     06/10/2020    CHOL 206 (H) 06/10/2020    TRIG 88 06/10/2020    HDL 75 06/10/2020    ALT 16 06/10/2020    AST 17 06/10/2020     06/10/2020    K 4.3 06/10/2020     06/10/2020    CREATININE 0.8 06/10/2020    BUN 19 06/10/2020    CO2 29 06/10/2020    TSH 2.591 06/10/2020    HGBA1C 5.2 06/10/2020       Diagnostic Results:  CT: Reviewed     CT:  FINDINGS:  Bilateral calcified granulomas.  Miniscule nodule in the peripheral left upper lung (see coronal image 92 for example.  This measures 3 mm.  It is also seen on image 133 of  series 5.  Heart size is normal.  No pericardial effusion.  Great vessels remain grossly patent.  Trachea and thyroid appear normal.  No pathologically enlarged lymph nodes.  Bones are intact.     No focal hepatic lesion. No gallstones. The spleen and the pancreas are normal. No biliary or pancreatic ductal dilation. Adrenal glands are within normal limits. There is no hydronephrosis. Aorta and IVC are within normal limits.  There are no pathologically enlarged lymph nodes. No ascites. Urinary bladder appears normal.  The uterus is present.  There is a right adnexal cystic lesion seen which measures 3.2 cm.  Further evaluation with ultrasound could be performed.     Stomach and small bowel are normal without evidence of bowel obstruction.  No evidence to suggest appendicitis.  There is masslike area in the rectosigmoid region in this patient with history of rectosigmoid adenocarcinoma.  This area measures on the order of 3.8 cm maximally..     Impression:     Masslike thickening in the rectus sigmoid region correlating to the known malignancy.  Miniscule nodule in the peripheral left upper lung, indeterminate but likely incidental., Right adnexal cyst for which further evaluation with ultrasound could be performed, and other findings as above.      ASSESSMENT/PLAN:     56yo F with sigmoid adenocarcinoma with negative workup for metastatic disease    - Long discussed with the patient regarding the nature of her colonic adenocarcinoma.  Discussed that she does not have evidence of metastatic disease.  Discussed that the treatment recommendation would be up-front surgery with colonic resection via sigmoidectomy versus low anterior resection depending on the exact location of the tumor intraoperatively.  She voiced understanding of this and is agreeable to proceed  - OR on 10/1/2020 for lap sigmoidectomy vs LAR  - All risks, benefits and alternatives fully explained to patient. Risks include, but are not limited to,  bleeding, infection, anastomotic leak, damage to ureter, damage to other intra-abdominal organs such as colon, rectum, small bowel, stomach, liver, bladder, reproductive organs, sexual dysfunction, urinary dysfunction, postoperative abscess, conversion to open operation, perioperative MI, CVA and death.  All questions field and appropriately answered to patient's satisfaction.  Consent signed and placed on chart.  - mechanical and oral abx bowel prep prior to surgery  - ERAS protocol  - will followup CEA level prior to surgery    Over 25 minutes were spent in face to face contact with the patient with greater than 50% spent discussing their diagnosis and coordination of care.     Ziggy Hannah MD  Colon and Rectal Surgery  [facility]

## 2020-09-09 NOTE — LETTER
September 9, 2020      Rianna Dillon MD  [address] [place] [address] [address] [place] [id number]           [facility]  [address] [address] [facility]  [address] [place] [address]  Phone: [phone]  Fax: [phone]          Patient: Jhoana Sierra   MR Number: [id number]   YOB: 1965   Date of Visit: 9/9/2020       Dear Dr. Rianna Dillon:    Thank you for referring Jhoana Sierra to me for evaluation. Attached you will find relevant portions of my assessment and plan of care.    If you have questions, please do not hesitate to call me. I look forward to following Jhoana Sierra along with you.    Sincerely,    Ziggy Hannah MD    Enclosure  CC:  No Recipients    If you would like to receive this communication electronically, please contact [email or web address] or [phone] to request more information on [employer] Link access.    For providers and/or their staff who would like to refer a patient to [facility], please contact us through our one-stop-shop provider referral line, [facility], at [phone].    If you feel you have received this communication in error or would no longer like to receive these types of communications, please e-mail [email or web address]

## 2020-09-09 NOTE — PROGRESS NOTES
History & Physical    SUBJECTIVE:     CC: rectosigmoid adenocarcinoma  Ref MD: Rianna Dillon MD    History of Present Illness:  Patient is a 55 y.o. 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  Patient had a positive fit test in June 2020 which prompted the scheduling of a colonoscopy.  This colonoscopy was performed in August 2020 and showed a mass in the rectosigmoid region with biopsies resulting in adenocarcinoma.  Patient states that she had seen intermittent blood in her stool for 3 months prior to this.  This was her 1st colonoscopy ever.  She denies any associated fever, chills, nausea, vomiting, diarrhea, constipation, weight loss or night sweats.  She has no known personal family history of colorectal cancer or colonic polyps previously.  Her only significant abdominal surgical history is an open ovarian cyst removal via Pfannenstiel incision.    Review of patient's allergies indicates:  No Known Allergies    Current Outpatient Medications   Medication Sig Dispense Refill    meloxicam (MOBIC) 15 MG tablet Take 1 tablet (15 mg total) by mouth daily as needed for Pain. 30 tablet 3    metroNIDAZOLE (FLAGYL) 500 MG tablet Take 1 tablet (500 mg total) by mouth 3 (three) times daily. Take initial dose@2pm, second dose@3pm and final dose@10pm day before surgery 3 tablet 0    multivitamin with minerals tablet Take 1 tablet by mouth once daily.      neomycin (MYCIFRADIN) 500 mg Tab Take 2 tablets (1,000 mg total) by mouth 3 (three) times daily. Take 1st dose@2pm,take 2nd dose@3pm, take last dose@10pm day before surgery 6 tablet 0    polyethylene glycol (GLYCOLAX) 17 gram/dose powder Take 238 g by mouth once daily. Mix with 2L of gatorade, drink all mixed solution starting@12pm day before surgery, finish by 10pm 1 Bottle 0    pseudoephedrine (SUDAFED) 30 MG tablet Take 30 mg by mouth every 4 (four) hours as needed for Congestion.      soy isofla/blk cohosh/mag bark (ESTROVEN ORAL) Take 1  tablet by mouth once daily.      tiZANidine (ZANAFLEX) 4 MG tablet Take 1 tablet (4 mg total) by mouth 3 (three) times daily as needed. 30 tablet 3     No current facility-administered medications for this visit.        Past Medical History:   Diagnosis Date    PONV (postoperative nausea and vomiting)      Past Surgical History:   Procedure Laterality Date    COLONOSCOPY N/A 8/25/2020    Procedure: COLONOSCOPY;  Surgeon: Rianna Dillon MD;  Location: [facility];  Service: Endoscopy;  Laterality: N/A;    OVARIAN CYST REMOVAL      WISDOM TOOTH EXTRACTION       Family History   Problem Relation Age of Onset    Fibromyalgia Sister     Diabetes Neg Hx     Heart disease Neg Hx      Social History     Tobacco Use    Smoking status: Never Smoker    Smokeless tobacco: Never Used   Substance Use Topics    Alcohol use: Yes     Frequency: Monthly or less     Drinks per session: 1 or 2     Binge frequency: Never     Comment: Occ use    Drug use: No        Review of Systems:  Review of Systems   Constitutional: Negative for activity change, appetite change, chills, fatigue, fever and unexpected weight change.   HENT: Negative for congestion, ear pain, sore throat and trouble swallowing.    Eyes: Negative for pain, redness and itching.   Respiratory: Negative for cough, shortness of breath and wheezing.    Cardiovascular: Negative for chest pain, palpitations and leg swelling.   Gastrointestinal: Positive for blood in stool. Negative for abdominal distention, abdominal pain, constipation, diarrhea, nausea, rectal pain and vomiting.   Endocrine: Negative for cold intolerance, heat intolerance and polyuria.   Genitourinary: Negative for dysuria, flank pain, frequency and hematuria.   Musculoskeletal: Negative for gait problem, joint swelling and neck pain.   Skin: Negative for color change, rash and wound.   Allergic/Immunologic: Negative for environmental allergies and immunocompromised state.   Neurological:  Negative for dizziness, speech difficulty, weakness and numbness.   Psychiatric/Behavioral: Negative for agitation, confusion and hallucinations.       OBJECTIVE:     Vital Signs (Most Recent)  Temp: 98.5 °F (36.9 °C) (09/09/20 1333)  Pulse: 83 (09/09/20 1333)  BP: 127/70 (09/09/20 1333)  SpO2: 98 % (09/09/20 1333)     67.7 kg (149 lb 4 oz)     Physical Exam:  Physical Exam  Constitutional:       Appearance: She is well-developed.   HENT:      Head: Normocephalic and atraumatic.   Eyes:      Conjunctiva/sclera: Conjunctivae normal.   Neck:      Musculoskeletal: Normal range of motion.      Thyroid: No thyromegaly.   Cardiovascular:      Rate and Rhythm: Normal rate and regular rhythm.   Pulmonary:      Effort: Pulmonary effort is normal. No respiratory distress.   Abdominal:      General: There is no distension.      Palpations: Abdomen is soft. There is no mass.      Tenderness: There is no abdominal tenderness.      Comments: Well-healed Pfannenstiel incision   Musculoskeletal: Normal range of motion.         General: No tenderness.   Skin:     General: Skin is warm and dry.      Capillary Refill: Capillary refill takes less than 2 seconds.      Findings: No rash.   Neurological:      Mental Status: She is alert and oriented to person, place, and time.         Laboratory  Lab Results   Component Value Date    WBC 4.57 06/10/2020    HGB 12.8 06/10/2020    HCT 40.3 06/10/2020     06/10/2020    CHOL 206 (H) 06/10/2020    TRIG 88 06/10/2020    HDL 75 06/10/2020    ALT 16 06/10/2020    AST 17 06/10/2020     06/10/2020    K 4.3 06/10/2020     06/10/2020    CREATININE 0.8 06/10/2020    BUN 19 06/10/2020    CO2 29 06/10/2020    TSH 2.591 06/10/2020    HGBA1C 5.2 06/10/2020       Diagnostic Results:  CT: Reviewed     CT:  FINDINGS:  Bilateral calcified granulomas.  Miniscule nodule in the peripheral left upper lung (see coronal image 92 for example.  This measures 3 mm.  It is also seen on image 133 of  series 5.  Heart size is normal.  No pericardial effusion.  Great vessels remain grossly patent.  Trachea and thyroid appear normal.  No pathologically enlarged lymph nodes.  Bones are intact.     No focal hepatic lesion. No gallstones. The spleen and the pancreas are normal. No biliary or pancreatic ductal dilation. Adrenal glands are within normal limits. There is no hydronephrosis. Aorta and IVC are within normal limits.  There are no pathologically enlarged lymph nodes. No ascites. Urinary bladder appears normal.  The uterus is present.  There is a right adnexal cystic lesion seen which measures 3.2 cm.  Further evaluation with ultrasound could be performed.     Stomach and small bowel are normal without evidence of bowel obstruction.  No evidence to suggest appendicitis.  There is masslike area in the rectosigmoid region in this patient with history of rectosigmoid adenocarcinoma.  This area measures on the order of 3.8 cm maximally..     Impression:     Masslike thickening in the rectus sigmoid region correlating to the known malignancy.  Miniscule nodule in the peripheral left upper lung, indeterminate but likely incidental., Right adnexal cyst for which further evaluation with ultrasound could be performed, and other findings as above.      ASSESSMENT/PLAN:     56yo F with sigmoid adenocarcinoma with negative workup for metastatic disease    - Long discussed with the patient regarding the nature of her colonic adenocarcinoma.  Discussed that she does not have evidence of metastatic disease.  Discussed that the treatment recommendation would be up-front surgery with colonic resection via sigmoidectomy versus low anterior resection depending on the exact location of the tumor intraoperatively.  She voiced understanding of this and is agreeable to proceed  - OR on 10/1/2020 for lap sigmoidectomy vs LAR  - All risks, benefits and alternatives fully explained to patient. Risks include, but are not limited to,  bleeding, infection, anastomotic leak, damage to ureter, damage to other intra-abdominal organs such as colon, rectum, small bowel, stomach, liver, bladder, reproductive organs, sexual dysfunction, urinary dysfunction, postoperative abscess, conversion to open operation, perioperative MI, CVA and death.  All questions field and appropriately answered to patient's satisfaction.  Consent signed and placed on chart.  - mechanical and oral abx bowel prep prior to surgery  - ERAS protocol  - will followup CEA level prior to surgery    Over 25 minutes were spent in face to face contact with the patient with greater than 50% spent discussing their diagnosis and coordination of care.     Ziggy Hannah MD  Colon and Rectal Surgery  [facility]

## 2020-09-09 NOTE — PROGRESS NOTES
Subjective:   Date of Visit: 9/9/20   ?   ?    REFERRING PROVIDER: Rianna Dillon MD  [address] [place] [address] [place] [address]   ?   CHIEF COMPLAINT: No chief complaint on file.  ???????   ?   ONCOLOGIC DIAGNOSIS:    FINAL PATHOLOGY DIAGNOSIS:  09/02/2020    RECTOSIGMOID MASS, BIOPSY:   Adenocarcinoma   The biopsy shows a single minute fragment showing infiltrative gland in the stroma in the backgrounf of multiple fragments of high-grade dysplastic colonic mucosa. Findings are in keeping with adenocarcinoma.   Tumor is extremely limited for a meaningful interpretation of MMR-IHC and it's recommended to be performed on the resection specimen.  ?     HPI:  55-year-old female with past medical history significant for chronic allergy was seen by her PCP on 06/08/2020 for wellness exam.  At that time she was advised to obtain screening colonoscopy given positive iFOBT and mammographic breast evaluation.    Screening colonoscopy was performed on 08/25/2020 and a partially obstructing tumor in the rectosigmoid colon was found and biopsied.  The biopsy showed a single minute fragment showing infiltrative gland in the stroma in the background of multiple fragments of high grade dysplastic colonic mucosa, keeping with adenocarcinoma.  Given the limited nature of the sample, immunohistochemistry for MMR status was not performed.    She was referred to follow-up with oncology to discuss management options.  Today she denies melena or hematochezia.  She denies unintentional weight loss, nausea or vomiting, abdominal pain, diarrhea or dysuria.    Family history significant for maternal grandfather with lymphoma.  No other pertinent family history.      Review of Systems   Constitutional: Negative for activity change, appetite change, chills, fatigue, fever and unexpected weight change.   HENT: Negative for hearing loss, mouth sores, nosebleeds, sore throat, tinnitus, trouble swallowing and voice change.    Eyes:  Negative for visual disturbance.   Respiratory: Negative for cough, chest tightness and shortness of breath.    Cardiovascular: Negative for chest pain, palpitations and leg swelling.   Gastrointestinal: Negative for abdominal pain, anal bleeding, blood in stool, constipation, diarrhea, nausea and vomiting.   Genitourinary: Negative for dysuria, frequency, hematuria, pelvic pain, vaginal bleeding and vaginal pain.   Musculoskeletal: Negative for arthralgias, back pain, joint swelling and neck pain.   Skin: Negative for color change, pallor, rash and wound.   Allergic/Immunologic: Negative for immunocompromised state.   Neurological: Negative for dizziness, tremors, syncope, speech difficulty, weakness, light-headedness and headaches.   Hematological: Negative for adenopathy. Does not bruise/bleed easily.   Psychiatric/Behavioral: Negative for agitation, confusion, decreased concentration, hallucinations and sleep disturbance. The patient is not nervous/anxious.        ?   PAST MEDICAL HISTORY:   Past Medical History:   Diagnosis Date    PONV (postoperative nausea and vomiting)     ?     PAST SURGICAL HISTORY:   Past Surgical History:   Procedure Laterality Date    COLONOSCOPY N/A 8/25/2020    Procedure: COLONOSCOPY;  Surgeon: Rianna Dillon MD;  Location: [facility];  Service: Endoscopy;  Laterality: N/A;    OVARIAN CYST REMOVAL      WISDOM TOOTH EXTRACTION        ?   ALLERGIES:   Allergies as of 09/09/2020    (No Known Allergies)      ?   MEDICATIONS:?   Outpatient Medications Marked as Taking for the 9/9/20 encounter (Office Visit) with Duc Stuart MD   Medication Sig Dispense Refill    meloxicam (MOBIC) 15 MG tablet Take 1 tablet (15 mg total) by mouth daily as needed for Pain. 30 tablet 3    multivitamin with minerals tablet Take 1 tablet by mouth once daily.      pseudoephedrine (SUDAFED) 30 MG tablet Take 30 mg by mouth every 4 (four) hours as needed for Congestion.      tiZANidine  (ZANAFLEX) 4 MG tablet Take 1 tablet (4 mg total) by mouth 3 (three) times daily as needed. 30 tablet 3      ?   SOCIAL HISTORY:?   Social History     Tobacco Use    Smoking status: Never Smoker    Smokeless tobacco: Never Used   Substance Use Topics    Alcohol use: Yes     Frequency: Monthly or less     Drinks per session: 1 or 2     Binge frequency: Never     Comment: Occ use        ?   FAMILY HISTORY:   family history includes Fibromyalgia in her sister.   ?     Objective:      Physical Exam  Constitutional:       General: She is not in acute distress.     Appearance: She is well-developed. She is not ill-appearing or toxic-appearing.   HENT:      Head: Normocephalic and atraumatic.      Mouth/Throat:      Pharynx: No oropharyngeal exudate.   Eyes:      General: No scleral icterus.        Right eye: No discharge.         Left eye: No discharge.      Conjunctiva/sclera: Conjunctivae normal.      Pupils: Pupils are equal, round, and reactive to light.   Neck:      Musculoskeletal: Normal range of motion and neck supple.      Thyroid: No thyromegaly.   Cardiovascular:      Rate and Rhythm: Normal rate and regular rhythm.      Heart sounds: No murmur.   Pulmonary:      Effort: Pulmonary effort is normal. No respiratory distress.      Breath sounds: Normal breath sounds.   Chest:      Chest wall: No tenderness.   Abdominal:      General: Bowel sounds are normal. There is no distension.      Palpations: Abdomen is soft. There is no mass.      Tenderness: There is no abdominal tenderness. There is no guarding or rebound.   Musculoskeletal: Normal range of motion.         General: No tenderness.   Lymphadenopathy:      Cervical: No cervical adenopathy.      Right cervical: No superficial cervical adenopathy.     Left cervical: No superficial cervical adenopathy.      Upper Body:      Right upper body: No supraclavicular or pectoral adenopathy.      Left upper body: No supraclavicular or pectoral adenopathy.   Skin:     " General: Skin is warm and dry.      Capillary Refill: Capillary refill takes 2 to 3 seconds.      Coloration: Skin is not pale.      Findings: No erythema or rash.   Neurological:      Mental Status: She is alert and oriented to person, place, and time.      Cranial Nerves: No cranial nerve deficit.      Sensory: No sensory deficit.   Psychiatric:         Behavior: Behavior normal. Behavior is cooperative.         Judgment: Judgment normal.         ?   Vitals:    20 1143   BP: 132/78   Pulse: 71   Resp: 18   Temp: 98.4 °F (36.9 °C)      ?     ECOG SCORE    0 - Fully active-able to carry on all pre-disease performance without restriction           Laboratory:  ?   No visits with results within 1 Day(s) from this visit.   Latest known visit with results is:   Admission on 2020, Discharged on 2020   Component Date Value Ref Range Status    Final Pathologic Diagnosis 2020    Final                    Value:RECTOSIGMOID MASS, BIOPSY:  Adenocarcinoma  The biopsy shows a single minute fragment showing infiltrative gland in the  stroma in the backgrounf of multiple fragments of high-grade dysplastic  colonic mucosa. Findings are in keeping with adenocarcinoma.  Tumor is extremely limited for a meaningful interpretation of MMR-IHC and  it's recommended to be performed on the resection specimen.  Multiple GI pathologists have reviewed this case and they all agree.      Gross 2020    Final                    Value:Patient ID/Pathology ID:  [id number]  Received in formalin labeled "rectosigmoid mass biopsies" are soft tan tissue  fragments measuring 5 x 5 mm in aggregate.  Submitted entirely in cassette  [id number]  Grossed by BELA Russell        ?   Tumor markers   ?   ?   ImaginD Echo w/ Color Flow Doppler  Date of Procedure: 2018    TEST DESCRIPTION     Aorta: The aortic root is normal in size, measuring 2.5 cm at sinotubular junction and 2.6 cm at Sinuses of Valsalva. The proximal " ascending aorta is normal in size, measuring 2.5 cm across.     Left Atrium: The left atrial volume index is normal, measuring 20.92 cc/m2.     Left Ventricle: The left ventricle is normal in size, with an end-diastolic diameter of 4.2 cm, and an end-systolic diameter of 1.8 cm. LV wall thickness is normal, with the septum and the posterior wall each measuring 0.9 cm across. Relative wall   thickness was normal at 0.43, and the LV mass index was 74.0 g/m2 consistent with normal left ventricular mass. There are no regional wall motion abnormalities. Left ventricular systolic function appears normal. Visually estimated ejection fraction is   60-65%. The LV Doppler derived stroke volume equals 71.0 ccs.     Diastolic indices: E wave velocity 0.8 m/s, E/A ratio 1.3,  msec., E/e' ratio(avg) 8. Diastolic function is normal.     Right Atrium: The right atrium is normal in size, measuring 3.9 cm in length and 2.9 cm in width in the apical view.     Right Ventricle: The right ventricle is normal in size measuring 2.7 cm at the base in the apical right ventricle-focused view. Global right ventricular systolic function appears normal. Tricuspid annular plane systolic excursion (TAPSE) is 1.8 cm. The   estimated PA systolic pressure is greater than 28 mmHg.     Aortic Valve:  Aortic valve is normal in structure with normal leaflet mobility.     Mitral Valve:  Mitral valve is normal in structure with normal leaflet mobility.     Tricuspid Valve:  Tricuspid valve is normal in structure with normal leaflet mobility. There is mild tricuspid regurgitation.     Pulmonary Valve:  Pulmonary valve is normal in structure with normal leaflet mobility.     Intracavitary: There is no evidence of pericardial effusion, intracavity mass, thrombi, or vegetation.     CONCLUSIONS     1 - No wall motion abnormalities.     2 - Normal left ventricular systolic function (EF 60-65%).     3 - Normal left ventricular diastolic function.     4 -  Normal right ventricular systolic function .     5 - The estimated PA systolic pressure is greater than 28 mmHg.     6 - Mild tricuspid regurgitation.     This document has been electronically    SIGNED BY: Santhosh Benites MD On: 04/18/2018 14:15     ?      Pathology:  Pathology Results  (Last 10 years)               08/25/20 1024  Specimen to Pathology, Surgery Gastrointestinal tract Final result    Narrative:  Pre-op Diagnosis: Screening [Z13.9]   Procedure(s):   COLONOSCOPY   Number of specimens: 1   Name of specimens:   #1 Rectosigmoid Mass BX's   Specimen total (fresh, frozen, permanent):->1              ?   Assessment/Plan:       1. Adenocarcinoma of colon    2. Colon adenocarcinoma          ?Adenocarcinoma of colon  Adenocarcinoma of rectosigmoid colon; limited pathology.  CT scan of chest abdomen pelvis has been ordered to rule out distant disease which could potentially be biopsied for complete histologic diagnosis.  We will obtain CBC, CMP and CEA levels.    Has upcoming appointment with general surgery on 09/09/2020 to discuss possible resection.  Will plan on seeing her back in 2 weeks with repeat labs or sooner if needed.     Screening mammography:  Up-to-date per patient.  Most recent in June of 2020 at [facility].  Will have her sign release of information form to obtain records.    Follow-Up: Follow up in about 2 weeks (around 9/23/2020).    MONIKA VAUGHN Md., Ph.D  Hematology & Oncology Department  Phone #: [phone]

## 2020-09-23 NOTE — PRE ADMISSION SCREENING
Pre op instructions reviewed with patient per phone:    To confirm, Your surgeon has instructed you:  Surgery is scheduled 10/01/20 at 0700.      Please report to [facility] lobby by 0530.    Pre admit office to call afternoon prior to surgery with final arrival time.  If surgery is on Monday, Pre admit office to call Friday afternoon with with final arrival time.    Covid 19 testing is scheduled for 9/28/20 at [facility] @ 1030  Please self quarantine after Covid testing, prior to surgery    INSTRUCTIONS IMPORTANT!!!  ¨ No smoking after 12 midnight, the night before surgery.  ¨ No solid food after 12 midnight, but you may have clear liquids up until 3 hours prior to surgery.  This includes: grape, cranberry, and apple juice (not orange, and no coffee.)   ¨ OK to brush teeth, but no gum, candy or mints!    Reviewed instructions for night before surgery medications: Metronidazole 1 tablet @1400, 1500 & 2200  Neomycin         2 tablets@1400,1500 & 2200  Glycolax           Mix with two liters of non-red/purle Gatorade. Begin                                    drinking @ 1200 and finish by 2200.  Nothing to eat after midnight 09/27 with clear liquids only up to 3 hours prior to surgery. Pt verbalized understanding.     ¨ Take only these medicines with a small swallow of water-morning of surgery.  N/A     ____   Due to COVID 19 concerns, 1 visitor will be allowed in the pre operative area, and must adhere to social distancing guidelines.  One visitor/family member is currently allowed to visit in-patient rooms from 08:00 a.m to 6:00 p.m  ____   Family/caregivers will be updated re pt status via text/cell phone      ____  Do not wear makeup, including mascara.  ____  No powder, lotions or creams to surgical area.  ____  Please remove all jewelry, including piercings and leave at home.  ____  No money or valuables needed. Please leave at home.  ____  Please bring identification and  insurance information to hospital.  ____  If going home the same day, arrange for a ride home. You will not be able to   drive if Anesthesia was used.  ____  Children, under 12 years old, must remain in the waiting room with an adult.  They are not allowed in patient areas.  ____  Wear loose fitting clothing. Allow for dressings, bandages.  ____  Stop Aspirin, Ibuprofen, Motrin and Aleve at least 5-7 days before surgery, unless otherwise instructed by your doctor, or the nurse.   You MAY use Tylenol/acetaminophen until day of surgery.  ____  If you take diabetic medication, do not take am of surgery unless instructed by   Doctor.  ____ Stop taking any Fish Oil supplement or any Vitamins that contain Vitamin E at least 5 days prior to surgery.          Bathing Instructions-- The night before surgery and the morning prior to coming to the hospital:   -Do not shave the surgical area.   -Shower and wash your hair and body as usual with your regular soap and shampoo.   -Rinse your hair and body completely.   -Use one packet of hibiclens to wash the surgical site (using your hand) gently for 5 minutes.  Do not scrub you skin too hard.   -Do not use hibiclens on your head, face, or genitals.   -Do not wash with regular soap after you use the hibiclens.   -Rinse your body thoroughly.   -Dry with clean, soft towel.  Do not use lotion, cream, deodorant, or powders on   the surgical site.    Use antibacterial soap in place of hibiclens if your surgery is on the head, face or genitals.         Surgical Site Infection    Prevention of surgical site infections:     -Keep incisions clean and dry.   -Do not soak/submerge incisions in water until completely healed.   -Do not apply lotions, powders, creams, or deodorants to site.   -Always make sure hands are cleaned with antibacterial soap/ alcohol-based   prior to touching the surgical site.  (This includes doctors, nurses, staff, and yourself.)    Signs and  symptoms:   -Redness and pain around the area where you had surgery   -Drainage of cloudy fluid from your surgical wound   -Fever over 100.4  I have read or had read and explained to me, and understand the above information.

## 2020-09-28 ENCOUNTER — LAB VISIT (OUTPATIENT)
Dept: URGENT CARE | Facility: CLINIC | Age: 55
End: 2020-09-28
Payer: COMMERCIAL

## 2020-09-28 VITALS — HEART RATE: 98 BPM | OXYGEN SATURATION: 97 % | TEMPERATURE: 99 F

## 2020-09-28 DIAGNOSIS — Z01.818 PRE-OP TESTING: ICD-10-CM

## 2020-09-28 LAB — SARS-COV-2 RNA RESP QL NAA+PROBE: NOT DETECTED

## 2020-09-28 PROCEDURE — U0003 INFECTIOUS AGENT DETECTION BY NUCLEIC ACID (DNA OR RNA); SEVERE ACUTE RESPIRATORY SYNDROME CORONAVIRUS 2 (SARS-COV-2) (CORONAVIRUS DISEASE [COVID-19]), AMPLIFIED PROBE TECHNIQUE, MAKING USE OF HIGH THROUGHPUT TECHNOLOGIES AS DESCRIBED BY CMS-2020-01-R: HCPCS

## 2020-10-01 ENCOUNTER — PATIENT MESSAGE (OUTPATIENT)
Dept: SURGERY | Facility: HOSPITAL | Age: 55
End: 2020-10-01

## 2020-10-01 ENCOUNTER — TELEPHONE (OUTPATIENT)
Dept: HEMATOLOGY/ONCOLOGY | Facility: CLINIC | Age: 55
End: 2020-10-01

## 2020-10-01 NOTE — TELEPHONE ENCOUNTER
Arash barrientos contacted pt to discuss the results of her distress screening. Pt reported that her initial diagnosis scared her because of the lack of information she had about it. Pt stated that she is feeling much better about her diagnosis now, and that her doctors have been very informative and helpful. Arash barrientos does not anticipate any needs at this time, will continue to follow.

## 2020-10-05 ENCOUNTER — LAB VISIT (OUTPATIENT)
Dept: URGENT CARE | Facility: CLINIC | Age: 55
End: 2020-10-05
Payer: COMMERCIAL

## 2020-10-05 VITALS — HEART RATE: 100 BPM | TEMPERATURE: 99 F | OXYGEN SATURATION: 97 %

## 2020-10-05 DIAGNOSIS — Z01.818 PRE-OP TESTING: Primary | ICD-10-CM

## 2020-10-05 DIAGNOSIS — Z01.818 PRE-OP TESTING: ICD-10-CM

## 2020-10-05 PROCEDURE — U0003 INFECTIOUS AGENT DETECTION BY NUCLEIC ACID (DNA OR RNA); SEVERE ACUTE RESPIRATORY SYNDROME CORONAVIRUS 2 (SARS-COV-2) (CORONAVIRUS DISEASE [COVID-19]), AMPLIFIED PROBE TECHNIQUE, MAKING USE OF HIGH THROUGHPUT TECHNOLOGIES AS DESCRIBED BY CMS-2020-01-R: HCPCS

## 2020-10-06 LAB — SARS-COV-2 RNA RESP QL NAA+PROBE: NOT DETECTED

## 2020-10-08 ENCOUNTER — HOSPITAL ENCOUNTER (INPATIENT)
Facility: HOSPITAL | Age: 55
LOS: 2 days | Discharge: HOME | DRG: 331 | End: 2020-10-10
Attending: COLON & RECTAL SURGERY | Admitting: COLON & RECTAL SURGERY
Payer: COMMERCIAL

## 2020-10-08 ENCOUNTER — ANESTHESIA EVENT (OUTPATIENT)
Dept: SURGERY | Facility: HOSPITAL | Age: 55
DRG: 331 | End: 2020-10-08
Payer: COMMERCIAL

## 2020-10-08 ENCOUNTER — ANESTHESIA (OUTPATIENT)
Dept: SURGERY | Facility: HOSPITAL | Age: 55
DRG: 331 | End: 2020-10-08
Payer: COMMERCIAL

## 2020-10-08 DIAGNOSIS — C18.9 COLON ADENOCARCINOMA: ICD-10-CM

## 2020-10-08 PROCEDURE — 25000003 PHARM REV CODE 250: Performed by: NURSE ANESTHETIST, CERTIFIED REGISTERED

## 2020-10-08 PROCEDURE — 88309 TISSUE EXAM BY PATHOLOGIST: CPT | Mod: 26,,, | Performed by: PATHOLOGY

## 2020-10-08 PROCEDURE — 88304 PR  SURG PATH,LEVEL III: ICD-10-PCS | Mod: 26,,, | Performed by: PATHOLOGY

## 2020-10-08 PROCEDURE — 88342 IMHCHEM/IMCYTCHM 1ST ANTB: CPT | Performed by: PATHOLOGY

## 2020-10-08 PROCEDURE — 88341 PR IHC OR ICC EACH ADD'L SINGLE ANTIBODY  STAINPR: ICD-10-PCS | Mod: 26,,, | Performed by: PATHOLOGY

## 2020-10-08 PROCEDURE — 11000001 HC ACUTE MED/SURG PRIVATE ROOM

## 2020-10-08 PROCEDURE — 71000033 HC RECOVERY, INTIAL HOUR: Performed by: COLON & RECTAL SURGERY

## 2020-10-08 PROCEDURE — 63600175 PHARM REV CODE 636 W HCPCS: Performed by: COLON & RECTAL SURGERY

## 2020-10-08 PROCEDURE — 25000003 PHARM REV CODE 250: Performed by: COLON & RECTAL SURGERY

## 2020-10-08 PROCEDURE — 88309 PR  SURG PATH,LEVEL VI: ICD-10-PCS | Mod: 26,,, | Performed by: PATHOLOGY

## 2020-10-08 PROCEDURE — 71000039 HC RECOVERY, EACH ADD'L HOUR: Performed by: COLON & RECTAL SURGERY

## 2020-10-08 PROCEDURE — 88341 IMHCHEM/IMCYTCHM EA ADD ANTB: CPT | Mod: 59 | Performed by: PATHOLOGY

## 2020-10-08 PROCEDURE — 37000008 HC ANESTHESIA 1ST 15 MINUTES: Performed by: COLON & RECTAL SURGERY

## 2020-10-08 PROCEDURE — 88342 IMHCHEM/IMCYTCHM 1ST ANTB: CPT | Mod: 26,,, | Performed by: PATHOLOGY

## 2020-10-08 PROCEDURE — 36000710: Performed by: COLON & RECTAL SURGERY

## 2020-10-08 PROCEDURE — 27201423 OPTIME MED/SURG SUP & DEVICES STERILE SUPPLY: Performed by: COLON & RECTAL SURGERY

## 2020-10-08 PROCEDURE — 37000009 HC ANESTHESIA EA ADD 15 MINS: Performed by: COLON & RECTAL SURGERY

## 2020-10-08 PROCEDURE — 63600175 PHARM REV CODE 636 W HCPCS: Performed by: ANESTHESIOLOGY

## 2020-10-08 PROCEDURE — 88342 CHG IMMUNOCYTOCHEMISTRY: ICD-10-PCS | Mod: 26,,, | Performed by: PATHOLOGY

## 2020-10-08 PROCEDURE — 63600175 PHARM REV CODE 636 W HCPCS: Performed by: NURSE ANESTHETIST, CERTIFIED REGISTERED

## 2020-10-08 PROCEDURE — 44207 PR LAP,SURG,COLECTOMY,W/ANAST: ICD-10-PCS | Mod: ,,, | Performed by: COLON & RECTAL SURGERY

## 2020-10-08 PROCEDURE — 88309 TISSUE EXAM BY PATHOLOGIST: CPT | Performed by: PATHOLOGY

## 2020-10-08 PROCEDURE — C9290 INJ, BUPIVACAINE LIPOSOME: HCPCS | Performed by: COLON & RECTAL SURGERY

## 2020-10-08 PROCEDURE — 36000711: Performed by: COLON & RECTAL SURGERY

## 2020-10-08 PROCEDURE — 88341 IMHCHEM/IMCYTCHM EA ADD ANTB: CPT | Mod: 26,,, | Performed by: PATHOLOGY

## 2020-10-08 PROCEDURE — 88304 TISSUE EXAM BY PATHOLOGIST: CPT | Mod: 26,,, | Performed by: PATHOLOGY

## 2020-10-08 PROCEDURE — 88302 TISSUE EXAM BY PATHOLOGIST: CPT | Performed by: PATHOLOGY

## 2020-10-08 PROCEDURE — 44207 L COLECTOMY/COLOPROCTOSTOMY: CPT | Mod: ,,, | Performed by: COLON & RECTAL SURGERY

## 2020-10-08 PROCEDURE — S0030 INJECTION, METRONIDAZOLE: HCPCS | Performed by: COLON & RECTAL SURGERY

## 2020-10-08 PROCEDURE — 94799 UNLISTED PULMONARY SVC/PX: CPT

## 2020-10-08 RX ORDER — SODIUM CHLORIDE, SODIUM LACTATE, POTASSIUM CHLORIDE, CALCIUM CHLORIDE 600; 310; 30; 20 MG/100ML; MG/100ML; MG/100ML; MG/100ML
INJECTION, SOLUTION INTRAVENOUS CONTINUOUS PRN
Status: DISCONTINUED | OUTPATIENT
Start: 2020-10-08 | End: 2020-10-08

## 2020-10-08 RX ORDER — ROCURONIUM BROMIDE 10 MG/ML
INJECTION, SOLUTION INTRAVENOUS
Status: DISCONTINUED | OUTPATIENT
Start: 2020-10-08 | End: 2020-10-08

## 2020-10-08 RX ORDER — OXYCODONE HYDROCHLORIDE 5 MG/1
10 TABLET ORAL EVERY 4 HOURS PRN
Status: DISCONTINUED | OUTPATIENT
Start: 2020-10-08 | End: 2020-10-10 | Stop reason: HOSPADM

## 2020-10-08 RX ORDER — ONDANSETRON 2 MG/ML
4 INJECTION INTRAMUSCULAR; INTRAVENOUS DAILY PRN
Status: DISCONTINUED | OUTPATIENT
Start: 2020-10-08 | End: 2020-10-08 | Stop reason: HOSPADM

## 2020-10-08 RX ORDER — ACETAMINOPHEN 500 MG
1000 TABLET ORAL ONCE
Status: COMPLETED | OUTPATIENT
Start: 2020-10-08 | End: 2020-10-08

## 2020-10-08 RX ORDER — MIDAZOLAM HYDROCHLORIDE 1 MG/ML
INJECTION, SOLUTION INTRAMUSCULAR; INTRAVENOUS
Status: DISCONTINUED | OUTPATIENT
Start: 2020-10-08 | End: 2020-10-08

## 2020-10-08 RX ORDER — FAMOTIDINE 20 MG/1
20 TABLET, FILM COATED ORAL 2 TIMES DAILY
Status: DISCONTINUED | OUTPATIENT
Start: 2020-10-08 | End: 2020-10-10 | Stop reason: HOSPADM

## 2020-10-08 RX ORDER — OXYCODONE HYDROCHLORIDE 5 MG/1
5 TABLET ORAL EVERY 4 HOURS PRN
Status: DISCONTINUED | OUTPATIENT
Start: 2020-10-08 | End: 2020-10-10 | Stop reason: HOSPADM

## 2020-10-08 RX ORDER — AMOXICILLIN 250 MG
1 CAPSULE ORAL 2 TIMES DAILY
Status: DISCONTINUED | OUTPATIENT
Start: 2020-10-08 | End: 2020-10-10 | Stop reason: HOSPADM

## 2020-10-08 RX ORDER — DIPHENHYDRAMINE HYDROCHLORIDE 50 MG/ML
12.5 INJECTION INTRAMUSCULAR; INTRAVENOUS EVERY 6 HOURS PRN
Status: DISCONTINUED | OUTPATIENT
Start: 2020-10-08 | End: 2020-10-10 | Stop reason: HOSPADM

## 2020-10-08 RX ORDER — SUCCINYLCHOLINE CHLORIDE 20 MG/ML
INJECTION INTRAMUSCULAR; INTRAVENOUS
Status: DISCONTINUED | OUTPATIENT
Start: 2020-10-08 | End: 2020-10-08

## 2020-10-08 RX ORDER — ONDANSETRON 2 MG/ML
INJECTION INTRAMUSCULAR; INTRAVENOUS
Status: DISCONTINUED | OUTPATIENT
Start: 2020-10-08 | End: 2020-10-08

## 2020-10-08 RX ORDER — CELECOXIB 100 MG/1
400 CAPSULE ORAL
Status: COMPLETED | OUTPATIENT
Start: 2020-10-08 | End: 2020-10-08

## 2020-10-08 RX ORDER — HEPARIN SODIUM 5000 [USP'U]/ML
5000 INJECTION, SOLUTION INTRAVENOUS; SUBCUTANEOUS
Status: COMPLETED | OUTPATIENT
Start: 2020-10-08 | End: 2020-10-08

## 2020-10-08 RX ORDER — HYDROMORPHONE HYDROCHLORIDE 2 MG/ML
0.2 INJECTION, SOLUTION INTRAMUSCULAR; INTRAVENOUS; SUBCUTANEOUS EVERY 5 MIN PRN
Status: DISCONTINUED | OUTPATIENT
Start: 2020-10-08 | End: 2020-10-08 | Stop reason: HOSPADM

## 2020-10-08 RX ORDER — ONDANSETRON 2 MG/ML
4 INJECTION INTRAMUSCULAR; INTRAVENOUS EVERY 12 HOURS PRN
Status: DISCONTINUED | OUTPATIENT
Start: 2020-10-08 | End: 2020-10-08 | Stop reason: HOSPADM

## 2020-10-08 RX ORDER — SODIUM CHLORIDE, SODIUM LACTATE, POTASSIUM CHLORIDE, CALCIUM CHLORIDE 600; 310; 30; 20 MG/100ML; MG/100ML; MG/100ML; MG/100ML
INJECTION, SOLUTION INTRAVENOUS CONTINUOUS
Status: DISCONTINUED | OUTPATIENT
Start: 2020-10-08 | End: 2020-10-10 | Stop reason: HOSPADM

## 2020-10-08 RX ORDER — GABAPENTIN 300 MG/1
300 CAPSULE ORAL 3 TIMES DAILY
Status: DISCONTINUED | OUTPATIENT
Start: 2020-10-08 | End: 2020-10-10 | Stop reason: HOSPADM

## 2020-10-08 RX ORDER — CHLORHEXIDINE GLUCONATE ORAL RINSE 1.2 MG/ML
10 SOLUTION DENTAL 2 TIMES DAILY
Status: DISCONTINUED | OUTPATIENT
Start: 2020-10-08 | End: 2020-10-10 | Stop reason: HOSPADM

## 2020-10-08 RX ORDER — LIDOCAINE HYDROCHLORIDE 10 MG/ML
INJECTION, SOLUTION EPIDURAL; INFILTRATION; INTRACAUDAL; PERINEURAL
Status: DISCONTINUED | OUTPATIENT
Start: 2020-10-08 | End: 2020-10-08

## 2020-10-08 RX ORDER — GABAPENTIN 400 MG/1
800 CAPSULE ORAL
Status: COMPLETED | OUTPATIENT
Start: 2020-10-08 | End: 2020-10-08

## 2020-10-08 RX ORDER — SCOLOPAMINE TRANSDERMAL SYSTEM 1 MG/1
PATCH, EXTENDED RELEASE TRANSDERMAL
Status: DISCONTINUED | OUTPATIENT
Start: 2020-10-08 | End: 2020-10-08

## 2020-10-08 RX ORDER — NEOSTIGMINE METHYLSULFATE 1 MG/ML
INJECTION, SOLUTION INTRAVENOUS
Status: DISCONTINUED | OUTPATIENT
Start: 2020-10-08 | End: 2020-10-08

## 2020-10-08 RX ORDER — METRONIDAZOLE 500 MG/100ML
500 INJECTION, SOLUTION INTRAVENOUS
Status: COMPLETED | OUTPATIENT
Start: 2020-10-08 | End: 2020-10-08

## 2020-10-08 RX ORDER — ACETAMINOPHEN 10 MG/ML
1000 INJECTION, SOLUTION INTRAVENOUS EVERY 8 HOURS
Status: COMPLETED | OUTPATIENT
Start: 2020-10-08 | End: 2020-10-09

## 2020-10-08 RX ORDER — PROPOFOL 10 MG/ML
VIAL (ML) INTRAVENOUS
Status: DISCONTINUED | OUTPATIENT
Start: 2020-10-08 | End: 2020-10-08

## 2020-10-08 RX ORDER — DEXAMETHASONE SODIUM PHOSPHATE 4 MG/ML
INJECTION, SOLUTION INTRA-ARTICULAR; INTRALESIONAL; INTRAMUSCULAR; INTRAVENOUS; SOFT TISSUE
Status: DISCONTINUED | OUTPATIENT
Start: 2020-10-08 | End: 2020-10-08

## 2020-10-08 RX ORDER — FENTANYL CITRATE 50 UG/ML
INJECTION, SOLUTION INTRAMUSCULAR; INTRAVENOUS
Status: DISCONTINUED | OUTPATIENT
Start: 2020-10-08 | End: 2020-10-08

## 2020-10-08 RX ORDER — BUPIVACAINE HYDROCHLORIDE 2.5 MG/ML
INJECTION, SOLUTION EPIDURAL; INFILTRATION; INTRACAUDAL
Status: DISCONTINUED | OUTPATIENT
Start: 2020-10-08 | End: 2020-10-08 | Stop reason: HOSPADM

## 2020-10-08 RX ORDER — SODIUM CHLORIDE, SODIUM LACTATE, POTASSIUM CHLORIDE, CALCIUM CHLORIDE 600; 310; 30; 20 MG/100ML; MG/100ML; MG/100ML; MG/100ML
INJECTION, SOLUTION INTRAVENOUS CONTINUOUS
Status: DISCONTINUED | OUTPATIENT
Start: 2020-10-08 | End: 2020-10-08

## 2020-10-08 RX ORDER — ENOXAPARIN SODIUM 100 MG/ML
40 INJECTION SUBCUTANEOUS EVERY 24 HOURS
Status: DISCONTINUED | OUTPATIENT
Start: 2020-10-09 | End: 2020-10-10 | Stop reason: HOSPADM

## 2020-10-08 RX ORDER — ONDANSETRON 2 MG/ML
4 INJECTION INTRAMUSCULAR; INTRAVENOUS EVERY 6 HOURS PRN
Status: DISCONTINUED | OUTPATIENT
Start: 2020-10-08 | End: 2020-10-10 | Stop reason: HOSPADM

## 2020-10-08 RX ADMIN — HYDROMORPHONE HYDROCHLORIDE 0.2 MG: 2 INJECTION, SOLUTION INTRAMUSCULAR; INTRAVENOUS; SUBCUTANEOUS at 11:10

## 2020-10-08 RX ADMIN — SODIUM CHLORIDE, SODIUM LACTATE, POTASSIUM CHLORIDE, AND CALCIUM CHLORIDE: 600; 310; 30; 20 INJECTION, SOLUTION INTRAVENOUS at 08:10

## 2020-10-08 RX ADMIN — LIDOCAINE HYDROCHLORIDE 50 MG: 10 INJECTION, SOLUTION EPIDURAL; INFILTRATION; INTRACAUDAL; PERINEURAL at 07:10

## 2020-10-08 RX ADMIN — HYDROMORPHONE HYDROCHLORIDE 0.2 MG: 2 INJECTION, SOLUTION INTRAMUSCULAR; INTRAVENOUS; SUBCUTANEOUS at 12:10

## 2020-10-08 RX ADMIN — GABAPENTIN 300 MG: 300 CAPSULE ORAL at 09:10

## 2020-10-08 RX ADMIN — DEXAMETHASONE SODIUM PHOSPHATE 4 MG: 4 INJECTION, SOLUTION INTRA-ARTICULAR; INTRALESIONAL; INTRAMUSCULAR; INTRAVENOUS; SOFT TISSUE at 10:10

## 2020-10-08 RX ADMIN — MIDAZOLAM 2 MG: 1 INJECTION INTRAMUSCULAR; INTRAVENOUS at 07:10

## 2020-10-08 RX ADMIN — PROPOFOL 140 MG: 10 INJECTION, EMULSION INTRAVENOUS at 07:10

## 2020-10-08 RX ADMIN — CHLORHEXIDINE GLUCONATE 0.12% ORAL RINSE 10 ML: 1.2 LIQUID ORAL at 09:10

## 2020-10-08 RX ADMIN — GABAPENTIN 300 MG: 300 CAPSULE ORAL at 02:10

## 2020-10-08 RX ADMIN — CELECOXIB 400 MG: 100 CAPSULE ORAL at 06:10

## 2020-10-08 RX ADMIN — FENTANYL CITRATE 50 MCG: 50 INJECTION, SOLUTION INTRAMUSCULAR; INTRAVENOUS at 10:10

## 2020-10-08 RX ADMIN — SODIUM CHLORIDE, SODIUM LACTATE, POTASSIUM CHLORIDE, AND CALCIUM CHLORIDE: 600; 310; 30; 20 INJECTION, SOLUTION INTRAVENOUS at 07:10

## 2020-10-08 RX ADMIN — SCOPOLAMINE 1 PATCH: 1 PATCH, EXTENDED RELEASE TRANSDERMAL at 07:10

## 2020-10-08 RX ADMIN — ROCURONIUM BROMIDE 10 MG: 10 INJECTION, SOLUTION INTRAVENOUS at 09:10

## 2020-10-08 RX ADMIN — GLYCOPYRROLATE 0.6 MG: 0.2 INJECTION, SOLUTION INTRAMUSCULAR; INTRAVENOUS at 11:10

## 2020-10-08 RX ADMIN — DOCUSATE SODIUM - SENNOSIDES 1 TABLET: 50; 8.6 TABLET, FILM COATED ORAL at 09:10

## 2020-10-08 RX ADMIN — SODIUM CHLORIDE, SODIUM LACTATE, POTASSIUM CHLORIDE, AND CALCIUM CHLORIDE: .6; .31; .03; .02 INJECTION, SOLUTION INTRAVENOUS at 02:10

## 2020-10-08 RX ADMIN — ACETAMINOPHEN 1000 MG: 500 TABLET ORAL at 06:10

## 2020-10-08 RX ADMIN — FAMOTIDINE 20 MG: 20 TABLET ORAL at 09:10

## 2020-10-08 RX ADMIN — ONDANSETRON 4 MG: 2 INJECTION, SOLUTION INTRAMUSCULAR; INTRAVENOUS at 10:10

## 2020-10-08 RX ADMIN — CEFTRIAXONE SODIUM 2 G: 2 INJECTION, SOLUTION INTRAVENOUS at 07:10

## 2020-10-08 RX ADMIN — ACETAMINOPHEN 1000 MG: 10 INJECTION, SOLUTION INTRAVENOUS at 09:10

## 2020-10-08 RX ADMIN — FENTANYL CITRATE 100 MCG: 50 INJECTION, SOLUTION INTRAMUSCULAR; INTRAVENOUS at 08:10

## 2020-10-08 RX ADMIN — HEPARIN SODIUM 5000 UNITS: 5000 INJECTION INTRAVENOUS; SUBCUTANEOUS at 06:10

## 2020-10-08 RX ADMIN — SUCCINYLCHOLINE CHLORIDE 100 MG: 20 INJECTION, SOLUTION INTRAMUSCULAR; INTRAVENOUS at 07:10

## 2020-10-08 RX ADMIN — SODIUM CHLORIDE, SODIUM LACTATE, POTASSIUM CHLORIDE, AND CALCIUM CHLORIDE: 600; 310; 30; 20 INJECTION, SOLUTION INTRAVENOUS at 10:10

## 2020-10-08 RX ADMIN — ROCURONIUM BROMIDE 10 MG: 10 INJECTION, SOLUTION INTRAVENOUS at 08:10

## 2020-10-08 RX ADMIN — METRONIDAZOLE 500 MG: 500 SOLUTION INTRAVENOUS at 08:10

## 2020-10-08 RX ADMIN — NEOSTIGMINE METHYLSULFATE 4 MG: 1 INJECTION INTRAVENOUS at 11:10

## 2020-10-08 RX ADMIN — FENTANYL CITRATE 50 MCG: 50 INJECTION, SOLUTION INTRAMUSCULAR; INTRAVENOUS at 11:10

## 2020-10-08 RX ADMIN — GABAPENTIN 800 MG: 400 CAPSULE ORAL at 06:10

## 2020-10-08 RX ADMIN — ACETAMINOPHEN 1000 MG: 10 INJECTION, SOLUTION INTRAVENOUS at 02:10

## 2020-10-08 RX ADMIN — ROCURONIUM BROMIDE 45 MG: 10 INJECTION, SOLUTION INTRAVENOUS at 08:10

## 2020-10-08 RX ADMIN — ROCURONIUM BROMIDE 5 MG: 10 INJECTION, SOLUTION INTRAVENOUS at 07:10

## 2020-10-08 NOTE — ANESTHESIA RELEASE NOTE
"Anesthesia Release from PACU Note    Patient: Jhoana Sierra    Procedure(s) Performed: Procedure(s) (LRB):  LAPAROSCOPIC SIGMOID COLECTOMY (N/A)  BLOCK, TRANSVERSUS ABDOMINIS PLANE (N/A)  SIGMOIDOSCOPY, FLEXIBLE (N/A)    Anesthesia type: general    Post pain: Adequate analgesia    Post assessment: no apparent anesthetic complications, tolerated procedure well and no evidence of recall    Last Vitals:   Visit Vitals  BP (!) 144/65 (BP Location: Right arm, Patient Position: Sitting)   Pulse 81   Temp 36.8 °C (98.2 °F) (Temporal)   Resp 16   Ht 5' 4" (1.626 m)   Wt 66.4 kg (146 lb 6.2 oz)   LMP 08/25/2017   SpO2 99%   Breastfeeding No   BMI 25.13 kg/m²       Post vital signs: stable    Level of consciousness: responds to stimulation    Nausea/Vomiting: no nausea/no vomiting    Complications: none    Airway Patency: patent    Respiratory: unassisted    Cardiovascular: stable and blood pressure at baseline    Hydration: euvolemic       "

## 2020-10-08 NOTE — ANESTHESIA PREPROCEDURE EVALUATION
10/08/2020  Jhoana Sierra is a 55 y.o., female.    Anesthesia Evaluation    I have reviewed the Patient Summary Reports.    I have reviewed the Nursing Notes. I have reviewed the NPO Status.   I have reviewed the Medications.     Review of Systems  Anesthesia Hx:  No problems with previous Anesthesia Denies Hx of Anesthetic complications  Denies Family Hx of Anesthesia complications.   Denies Personal Hx of Anesthesia complications.   Social:  Non-Smoker, No Alcohol Use    Cardiovascular:  Cardiovascular Normal  ECG has been reviewed.    Pulmonary:  Pulmonary Normal    Renal/:  Renal/ Normal     Hepatic/GI:  Hepatic/GI Normal    Neurological:  Neurology Normal    Endocrine:  Endocrine Normal    Psych:  Psychiatric Normal         Patient Active Problem List   Diagnosis    Encounter for screening colonoscopy    Positive FIT (fecal immunochemical test)    Adenocarcinoma of colon    Colon adenocarcinoma     Past Surgical History:   Procedure Laterality Date    COLONOSCOPY N/A 8/25/2020    Procedure: COLONOSCOPY;  Surgeon: Rianna Dillon MD;  [facility];  Service: Endoscopy;  Laterality: N/A;    OVARIAN CYST REMOVAL      WISDOM TOOTH EXTRACTION           Physical Exam  General:  Well nourished    Airway/Jaw/Neck:  Airway Findings: Mouth Opening: Normal Tongue: Normal  General Airway Assessment: Adult  Mallampati: II  TM Distance: 4 - 6 cm  Jaw/Neck Findings:  Neck ROM: Normal ROM      Dental:  Dental Findings: In tact   Chest/Lungs:  Chest/Lungs Findings: Clear to auscultation, Normal Respiratory Rate     Heart/Vascular:  Heart Findings: Rate: Normal  Rhythm: Regular Rhythm  Sounds: Normal        Mental Status:  Mental Status Findings:  Cooperative, Alert and Oriented       Lab Results   Component Value Date    WBC 4.27 09/09/2020    HGB 12.7 09/09/2020    HCT 41.2 09/09/2020    MCV 97  09/09/2020     09/09/2020         Chemistry        Component Value Date/Time     09/09/2020 0926    K 4.4 09/09/2020 0926     09/09/2020 0926    CO2 29 09/09/2020 0926    BUN 14 09/09/2020 0926    CREATININE 0.8 09/09/2020 0926    GLU 94 09/09/2020 0926        Component Value Date/Time    CALCIUM 9.3 09/09/2020 0926    ALKPHOS 79 09/09/2020 0926    AST 16 09/09/2020 0926    ALT 14 09/09/2020 0926    BILITOT 0.5 09/09/2020 0926    ESTGFRAFRICA >60.0 09/09/2020 0926    EGFRNONAA >60.0 09/09/2020 0926            Anesthesia Plan  Type of Anesthesia, risks & benefits discussed:  Anesthesia Type:  general  Patient's Preference:   Intra-op Monitoring Plan: standard ASA monitors  Intra-op Monitoring Plan Comments:   Post Op Pain Control Plan: per primary service following discharge from PACU  Post Op Pain Control Plan Comments:   Induction:   IV  Beta Blocker:  Patient is not currently on a Beta-Blocker (No further documentation required).       Informed Consent: Patient understands risks and agrees with Anesthesia plan.  Questions answered. Anesthesia consent signed with patient.  ASA Score: 2     Day of Surgery Review of History & Physical: I have interviewed and examined the patient. I have reviewed the patient's H&P dated:  There are no significant changes.  H&P update referred to the surgeon.         Ready For Surgery From Anesthesia Perspective.

## 2020-10-08 NOTE — INTERVAL H&P NOTE
The patient has been examined and the H&P has been reviewed:    I concur with the findings and no changes have occurred since H&P was written.    Anesthesia/Surgery risks, benefits and alternative options discussed and understood by patient/family.          Active Hospital Problems    Diagnosis  POA    Colon adenocarcinoma [C18.9]  Yes      Resolved Hospital Problems   No resolved problems to display.

## 2020-10-08 NOTE — OP NOTE
[facility]  Surgery Department  Operative Note    SUMMARY     Date of Procedure: 10/8/2020     Procedure:  1.  Laparoscopic low anterior resection/proctosigmoidectomy  2.  Laparoscopic transverse abdominis plane block  3.  Flexible sigmoidoscopy    Surgeon(s) and Role:     * Ziggy Hannah MD - Primary    Assisting Surgeon: None    Pre-Operative Diagnosis: Colon adenocarcinoma [C18.9]    Post-Operative Diagnosis: Post-Op Diagnosis Codes:     * Colon adenocarcinoma [C18.9]    Anesthesia: General    Technical Procedures Used:   1.  Laparoscopic low anterior resection/proctosigmoidectomy  2.  Laparoscopic transverse abdominis plane block  3.  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  No evidence of metastatic disease.    Description of the Procedure:  Patient was brought to the operating placed supine on table.  General tracheal anesthesia was then induced.  Cuevas catheter was then sterilely placed.  The patient was moved to lithotomy position.  A rectal washout was then performed.  The abdomen was then prepped and draped usual sterile fashion.  A preoperative surgical time-out was then performed confirming the correct patient, procedure and preop medications given.  A 12 mm infraumbilical incision was made and dissection was carried down to level the fascia.  The fascia was sharply incised and the abdomen was entered sharply under direct visualization.  A 12 mm balloon trocar was inserted this defect and the abdomen was insufflated to pressure 15 mm of mercury.  The camera was introduced and there was no signs of injury upon entry.  There was no signs of metastatic disease in liver or the peritoneal surface.  A laparoscopic transverse abdominis  plane block was then performed using a mixture of 20 cc of Exparel, 30 cc of 0.25% bupivacaine plain and 10 cc of injectable saline.  12.5 cc injected into the transverse abdominis plane under direct laparoscopic visualization for total of 50 cc given for the block.  The remaining 10 cc mixture was used in the case for local infiltration.  Additional 5 mm trocars were then inserted into the abdomen under direct visualization in the right lower and right upper quadrant in usual fashion.    Attention was then turned to the pelvis where the tumor and tattoo were easily identified near the rectosigmoid junction.  There were no surrounding adhesions to other structures.  There was a large ovarian cyst on the right ovary however that was kept away during the case and protected.  The JERED pedicle was identified and was elevated.  A window was made in the mesentery on the medial side and a medial lateral dissection underneath the JERED pedicle was undertaken.  The ureter was easily identified 1st and was carefully protected throughout the entire process.  This was dissected out a medial-lateral its fashion taking care to sweep down the retroperitoneal structures as well as the nerves.  Once the JERED was completely dissected circumferentially, a high ligation using the laparoscopic LigaSure was performed with care again taken to protect the ureter.  The stump appeared to be completely hemostatic afterwards.  Attention was then turned to the lateral dissection.  The colon was mobilized off the white line of Toldt all the way up to the splenic flexure and the retroperitoneal attachments were bluntly dissected away from the colon all the way down to level the rectosigmoid again taking care to not enter the retroperitoneum or injure any underlying structures.  Once this was completed, attention was turned to the rectum.  A point at least 5 cm distal to the tattoo was chosen for transection in the rectum.  A mesenteric window was  made beneath this location and the remaining mesorectum between the taken JERED pedicle and this was then taken with the laparoscopic LigaSure.  Once this was completed, the right lower quadrant port was up sized to 12 mm port. Two loads of the laparoscopic 45 mm echelon stapler then used to transect the rectum at this location.  Once this was completed, the descending colon reached easily into the pelvis for anastomosis and the dissection was complete.    The abdomen was desufflated and the infraumbilical port was removed and the incision was lengthened a few cm to allow for specimen extraction.  A small wound protector was placed through this defect and the specimen was exteriorized through this defect.  The tumor was easily palpable and visible at the tattoo site and there was at least a 6 cm margin distal to the tumor as well as laxity in the colon proximal to this.  A point at least 6 cm proximal to the tumor where the descending and sigmoid junction was was chosen for transection and a mesenteric window was made beneath this location.  After clamping with noncrushing bowel clamps and the descending colon was divided at this location with a knife.  The mesentery was flashed to confirm good pulsatile blood flow and the remaining mesentery between the JERED pedicle and the cut end of the colon was taken with the LigaSure.  The specimen was then passed off the field for final pathology with good margins on either side.  A 0.6 cm proximal to the cut end of the colon was marked and a 29 mm EEA stapler was brought onto the field.  The anvil was brought out an anti mesenteric fashion at this carin and was sutured in place using a 2-0 Prolene suture for an end-to-side anastomosis.  The cut end of the colon was then stapled using a TA 60 blue load.  The staple line was then oversewn with a 2-0 Vicryl suture.  The specimen was then placed back within the abdominal cavity and the abdomen was reinsufflated after placing the 12  mm balloon trocar through the wound protector and closing it around it to achieve good insufflation.    Rectal sizers were then inserted through the anus up to the rectal staple line which reached easily without any tension.  The colon reached easily into the pelvis for anastomosis without tension as well.  The 29 mm EEA stapler was then brought through the anus and the spike was brought just anterior to the staple line.  The anvil was then  to the spike taking care to sweep away any other underlying structures and to ensure that the mesentery was straight and the colon was not twisted.  This reached without tension for the side-to-end anastomosis.  The stapler was then fired and once removed, the donuts were checked and found to be completely intact.  These were then sent for final pathology.  The pelvis was filled with irrigation and the proximal descending colon was clamped.  A flexible sigmoidoscope was then inserted into the anus and there was a negative air leak test.  There was a small ooze at the anastomosis in 1 location and a endoscopic clip was placed to control this oozing which it did.  At the end there was no oozing or bleeding from the anastomosis endoscopically.  Again there was a negative air leak test after this as well.  The rectum and colon were then desufflated.  The flexible sigmoidoscope was withdrawn.  The pelvis was then suctioned of irrigation.  The abdomen was checked for hemostasis was ensured.  The omentum was brought down and placed over the anastomosis in between the colon rectum as well as the bladder and uterus and small intestine.  The right lower quadrant 12 mm port was removed and the fascia was closed with a 0 Vicryl suture using a Dat-Rosa device in figure-of-eight fashion.  Once this was complete, the remaining ports removed and the wound protector was removed and the abdomen was desufflated.  The fascia of the extraction site was closed with #1 PDS suture in a  running fashion.  The local infiltration was completed.  The skin was then closed with 4-0 Monocryl suture.  Skin glue was applied.  The patient was then moved back in the supine position.  She was woken from general endotracheal anesthesia and taken to the postanesthesia care in stable condition.  She tolerated procedure well.  All sponge, needle and instrument counts were correct at the end of the case.      Significant Surgical Tasks Conducted by the Assistant(s), if Applicable: N/A    Complications: No    Estimated Blood Loss (EBL): 20 mL           Implants: * No implants in log *    Specimens:   Specimen (12h ago, onward)    None                  Condition: Good    Disposition: PACU - hemodynamically stable.    Attestation: I performed the procedure.

## 2020-10-08 NOTE — PLAN OF CARE
Pt remains free from falls/injuries this shift. Safety precautions maintained. Pain managed with oral pain medicine. IVF infusing per orders. Ambulation promoted. No s/s of acute distress noted. Will continue to monitor. Chart check completed.

## 2020-10-08 NOTE — TRANSFER OF CARE
"Anesthesia Transfer of Care Note    Patient: Jhoana Sierra    Procedure(s) Performed: Procedure(s) (LRB):  LAPAROSCOPIC SIGMOID COLECTOMY (N/A)  BLOCK, TRANSVERSUS ABDOMINIS PLANE (N/A)  SIGMOIDOSCOPY, FLEXIBLE (N/A)    Patient location: PACU    Anesthesia Type: general    Transport from OR: Transported from OR on room air with adequate spontaneous ventilation    Post pain: adequate analgesia    Post assessment: no apparent anesthetic complications    Post vital signs: stable    Level of consciousness: responds to stimulation    Nausea/Vomiting: no nausea/vomiting    Complications: none    Transfer of care protocol was followed      Last vitals:   Visit Vitals  BP (!) 144/65 (BP Location: Right arm, Patient Position: Sitting)   Pulse 81   Temp 36.8 °C (98.2 °F) (Temporal)   Resp 16   Ht 5' 4" (1.626 m)   Wt 66.4 kg (146 lb 6.2 oz)   LMP 08/25/2017   SpO2 99%   Breastfeeding No   BMI 25.13 kg/m²     "

## 2020-10-09 LAB
ANION GAP SERPL CALC-SCNC: 8 MMOL/L (ref 8–16)
BASOPHILS # BLD AUTO: 0.01 K/UL (ref 0–0.2)
BASOPHILS NFR BLD: 0.1 % (ref 0–1.9)
BUN SERPL-MCNC: 10 MG/DL (ref 6–20)
CALCIUM SERPL-MCNC: 8.6 MG/DL (ref 8.7–10.5)
CHLORIDE SERPL-SCNC: 106 MMOL/L (ref 95–110)
CO2 SERPL-SCNC: 27 MMOL/L (ref 23–29)
CREAT SERPL-MCNC: 0.8 MG/DL (ref 0.5–1.4)
DIFFERENTIAL METHOD: ABNORMAL
EOSINOPHIL # BLD AUTO: 0 K/UL (ref 0–0.5)
EOSINOPHIL NFR BLD: 0.1 % (ref 0–8)
ERYTHROCYTE [DISTWIDTH] IN BLOOD BY AUTOMATED COUNT: 12.4 % (ref 11.5–14.5)
EST. GFR  (AFRICAN AMERICAN): >60 ML/MIN/1.73 M^2
EST. GFR  (NON AFRICAN AMERICAN): >60 ML/MIN/1.73 M^2
GLUCOSE SERPL-MCNC: 90 MG/DL (ref 70–110)
HCT VFR BLD AUTO: 32.9 % (ref 37–48.5)
HGB BLD-MCNC: 10.5 G/DL (ref 12–16)
IMM GRANULOCYTES # BLD AUTO: 0.02 K/UL (ref 0–0.04)
IMM GRANULOCYTES NFR BLD AUTO: 0.3 % (ref 0–0.5)
LYMPHOCYTES # BLD AUTO: 1 K/UL (ref 1–4.8)
LYMPHOCYTES NFR BLD: 14.4 % (ref 18–48)
MAGNESIUM SERPL-MCNC: 1.7 MG/DL (ref 1.6–2.6)
MCH RBC QN AUTO: 29.9 PG (ref 27–31)
MCHC RBC AUTO-ENTMCNC: 31.9 G/DL (ref 32–36)
MCV RBC AUTO: 94 FL (ref 82–98)
MONOCYTES # BLD AUTO: 0.6 K/UL (ref 0.3–1)
MONOCYTES NFR BLD: 9 % (ref 4–15)
NEUTROPHILS # BLD AUTO: 5.2 K/UL (ref 1.8–7.7)
NEUTROPHILS NFR BLD: 76.1 % (ref 38–73)
NRBC BLD-RTO: 0 /100 WBC
PHOSPHATE SERPL-MCNC: 3 MG/DL (ref 2.7–4.5)
PLATELET # BLD AUTO: 199 K/UL (ref 150–350)
PMV BLD AUTO: 9.2 FL (ref 9.2–12.9)
POTASSIUM SERPL-SCNC: 4 MMOL/L (ref 3.5–5.1)
RBC # BLD AUTO: 3.51 M/UL (ref 4–5.4)
SODIUM SERPL-SCNC: 141 MMOL/L (ref 136–145)
WBC # BLD AUTO: 6.88 K/UL (ref 3.9–12.7)

## 2020-10-09 PROCEDURE — 84100 ASSAY OF PHOSPHORUS: CPT

## 2020-10-09 PROCEDURE — 83735 ASSAY OF MAGNESIUM: CPT

## 2020-10-09 PROCEDURE — 63600175 PHARM REV CODE 636 W HCPCS: Performed by: COLON & RECTAL SURGERY

## 2020-10-09 PROCEDURE — 94799 UNLISTED PULMONARY SVC/PX: CPT

## 2020-10-09 PROCEDURE — 80048 BASIC METABOLIC PNL TOTAL CA: CPT

## 2020-10-09 PROCEDURE — 99024 POSTOP FOLLOW-UP VISIT: CPT | Mod: ,,, | Performed by: COLON & RECTAL SURGERY

## 2020-10-09 PROCEDURE — 85025 COMPLETE CBC W/AUTO DIFF WBC: CPT

## 2020-10-09 PROCEDURE — 25000003 PHARM REV CODE 250: Performed by: SURGERY

## 2020-10-09 PROCEDURE — 99900035 HC TECH TIME PER 15 MIN (STAT)

## 2020-10-09 PROCEDURE — 99024 PR POST-OP FOLLOW-UP VISIT: ICD-10-PCS | Mod: ,,, | Performed by: COLON & RECTAL SURGERY

## 2020-10-09 PROCEDURE — 94760 N-INVAS EAR/PLS OXIMETRY 1: CPT

## 2020-10-09 PROCEDURE — 36415 COLL VENOUS BLD VENIPUNCTURE: CPT

## 2020-10-09 PROCEDURE — 11000001 HC ACUTE MED/SURG PRIVATE ROOM

## 2020-10-09 PROCEDURE — 25000003 PHARM REV CODE 250: Performed by: COLON & RECTAL SURGERY

## 2020-10-09 RX ORDER — ACETAMINOPHEN 325 MG/1
650 TABLET ORAL EVERY 6 HOURS PRN
Status: DISCONTINUED | OUTPATIENT
Start: 2020-10-09 | End: 2020-10-10 | Stop reason: HOSPADM

## 2020-10-09 RX ADMIN — GABAPENTIN 300 MG: 300 CAPSULE ORAL at 02:10

## 2020-10-09 RX ADMIN — FAMOTIDINE 20 MG: 20 TABLET ORAL at 08:10

## 2020-10-09 RX ADMIN — GABAPENTIN 300 MG: 300 CAPSULE ORAL at 08:10

## 2020-10-09 RX ADMIN — ENOXAPARIN SODIUM 40 MG: 40 INJECTION SUBCUTANEOUS at 10:10

## 2020-10-09 RX ADMIN — CHLORHEXIDINE GLUCONATE 0.12% ORAL RINSE 10 ML: 1.2 LIQUID ORAL at 08:10

## 2020-10-09 RX ADMIN — DOCUSATE SODIUM - SENNOSIDES 1 TABLET: 50; 8.6 TABLET, FILM COATED ORAL at 08:10

## 2020-10-09 RX ADMIN — ACETAMINOPHEN 1000 MG: 10 INJECTION, SOLUTION INTRAVENOUS at 06:10

## 2020-10-09 RX ADMIN — ACETAMINOPHEN 650 MG: 325 TABLET ORAL at 08:10

## 2020-10-09 NOTE — HPI
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

## 2020-10-09 NOTE — PROGRESS NOTES
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 No BM/flatus yet. Ambulating well. Pain well controlled.     Medications:  Continuous Infusions:   lactated ringers 75 mL/hr at 10/08/20 1453     Scheduled Meds:   chlorhexidine  10 mL Mouth/Throat BID    enoxaparin  40 mg Subcutaneous Q24H    famotidine  20 mg Oral BID    gabapentin  300 mg Oral TID    nozaseptin   Each Nostril BID    senna-docusate 8.6-50 mg  1 tablet Oral BID     PRN Meds:diphenhydrAMINE, ondansetron, oxyCODONE, oxyCODONE, promethazine (PHENERGAN) IVPB     Review of patient's allergies indicates:  No Known Allergies  Objective:     Vital Signs (Most Recent):  Temp: 98.6 °F (37 °C) (10/09/20 1234)  Pulse: 70 (10/09/20 1234)  Resp: 18 (10/09/20 1234)  BP: (!) 114/56 (10/09/20 1234)  SpO2: 97 % (10/09/20 1234) Vital Signs (24h Range):  Temp:  [97.9 °F (36.6 °C)-98.6 °F (37 °C)] 98.6 °F (37 °C)  Pulse:  [56-74] 70  Resp:  [18-20] 18  SpO2:  [96 %-100 %] 97 %  BP: (109-132)/(53-66) 114/56     Weight: 66.4 kg (146 lb 6.2 oz)  Body mass index is 25.13 kg/m².    Intake/Output - Last 3 Shifts       10/07 0700 - 10/08 0659 10/08 0700 - 10/09 0659 10/09 0700 - 10/10 0659    P.O.  300     I.V. (mL/kg)  3258.8 (49.1)     IV Piggyback  200     Total Intake(mL/kg)  3758.8 (56.6)     Urine (mL/kg/hr)  3825 (2.4) 100 (0.2)    Blood  20     Total Output  3845 100    Net  -86.3 -100                 Physical Exam  Constitutional:       Appearance: She is well-developed.   HENT:      Head: Normocephalic and atraumatic.   Eyes:      Conjunctiva/sclera:  Conjunctivae normal.   Neck:      Musculoskeletal: Normal range of motion.      Thyroid: No thyromegaly.   Cardiovascular:      Rate and Rhythm: Normal rate and regular rhythm.   Pulmonary:      Effort: Pulmonary effort is normal. No respiratory distress.   Abdominal:      Comments: Soft, nondistended, appropriately TTP; incisions c/d/i without erythema or drainage   Musculoskeletal: Normal range of motion.         General: No tenderness.   Skin:     General: Skin is warm and dry.      Capillary Refill: Capillary refill takes less than 2 seconds.      Findings: No rash.   Neurological:      Mental Status: She is alert and oriented to person, place, and time.         Significant Labs:  CBC:   Recent Labs   Lab 10/09/20  0656   WBC 6.88   RBC 3.51*   HGB 10.5*   HCT 32.9*      MCV 94   MCH 29.9   MCHC 31.9*     BMP:   Recent Labs   Lab 10/09/20  0656   GLU 90      K 4.0      CO2 27   BUN 10   CREATININE 0.8   CALCIUM 8.6*   MG 1.7     Assessment/Plan:     * Colon adenocarcinoma  54yo F with rectosigmoid adenocarcinoma now s/p lap LAR on 10/8/2020    - Cont reg diet  - HLIV  - Await full return of bowel function  - PO pain control  - OOB, ambulation encouraged  - PPx: LVNX        Ziggy Hannah MD  Colorectal Surgery  [facility]

## 2020-10-09 NOTE — PLAN OF CARE
Plan of care reviewed with pt, pt verbalized understanding. IV site clean, dry, intact, no redness or swelling noted. Abdominal lap sites x3 intact with dermabond. Cuevas patent and draining. Pt remains free of fall/trauma. No reports of pain or n/v. VSS. Purposeful q2h rounding, safety maintained, call light in reach, bed locked and in lowest position, side rails up x2. Will continue to monitor, observe and report any changes.

## 2020-10-09 NOTE — NURSING
Removed gutierrez catheter this AM, catheter intact upon removal, pt tolerated well. Due to void by 12:30pm

## 2020-10-09 NOTE — HOSPITAL COURSE
10/08/2020: Underwent laparoscopic LAR    10/09/2020: POD 1. Tolerating reg diet. No BM/flatus yet. Ambulating well. Pain well controlled.   10/10/2020:POD 2.  No particular issues and complaints.  Patient wishes to be discharged.  She was instructed to call her primary surgeon's office on Monday to establish an appointment.

## 2020-10-09 NOTE — PLAN OF CARE
Swer met with pt and pt's spouse Angel at bedside for initial assessment. Pt lives with spouse and was independent with adls prior to admission. Angel will provide transportation at discharge. Pt denied having home health in the past or using any medical equipment. Pt does not have a problem obtaining medication and drives self to medical appointments. Pt does not have an advanced directive at this time. SWer provided a transitional care folder, information on advanced directives, information on pharmacy bedside delivery, and discharge planning begins on admission with contact information for any needs/questions. Pt denied any post discharge needs at this time.    Pcp; Dhruv Nguyen MD    Pharm;   [facility] [address] [place], [place] - [address] [address] [facility]  [address] [facility] [address]  Phone: [phone] Fax: [phone]    My Chart; Active  Bedside Delivery; Yes     10/09/20 1120   Discharge Assessment   Assessment Type Discharge Planning Assessment   Confirmed/corrected address and phone number on facesheet? Yes   Assessment information obtained from? Patient;Caregiver   Communicated expected length of stay with patient/caregiver yes   Prior to hospitilization cognitive status: Alert/Oriented   Prior to hospitalization functional status: Independent   Current cognitive status: Alert/Oriented   Current Functional Status: Independent   Facility Arrived From: home   Lives With spouse   Able to Return to Prior Arrangements yes   Is patient able to care for self after discharge? Yes   Who are your caregiver(s) and their phone number(s)? Angel Fuentes (Spouse) [phone]   Patient's perception of discharge disposition home or selfcare   Readmission Within the Last 30 Days no previous admission in last 30 days   Patient currently being followed by outpatient case management? No   Patient currently receives any other outside agency services? No   Equipment Currently Used at Home none   Do  you have any problems affording any of your prescribed medications? No   Is the patient taking medications as prescribed? yes   Does the patient have transportation home? Yes   Transportation Anticipated family or friend will provide   Does the patient receive services at the [facility]? No   Discharge Plan A Home with family   DME Needed Upon Discharge  none   Patient/Family in Agreement with Plan yes

## 2020-10-09 NOTE — ASSESSMENT & PLAN NOTE
54yo F with rectosigmoid adenocarcinoma now s/p lap LAR on 10/8/2020    - Cont reg diet  - HLIV  - Await full return of bowel function  - PO pain control  - OOB, ambulation encouraged  - PPx: LVNX

## 2020-10-09 NOTE — SUBJECTIVE & OBJECTIVE
Interval History: Tolerating reg diet. No BM/flatus yet. Ambulating well. Pain well controlled.     Medications:  Continuous Infusions:   lactated ringers 75 mL/hr at 10/08/20 1453     Scheduled Meds:   chlorhexidine  10 mL Mouth/Throat BID    enoxaparin  40 mg Subcutaneous Q24H    famotidine  20 mg Oral BID    gabapentin  300 mg Oral TID    nozaseptin   Each Nostril BID    senna-docusate 8.6-50 mg  1 tablet Oral BID     PRN Meds:diphenhydrAMINE, ondansetron, oxyCODONE, oxyCODONE, promethazine (PHENERGAN) IVPB     Review of patient's allergies indicates:  No Known Allergies  Objective:     Vital Signs (Most Recent):  Temp: 98.6 °F (37 °C) (10/09/20 1234)  Pulse: 70 (10/09/20 1234)  Resp: 18 (10/09/20 1234)  BP: (!) 114/56 (10/09/20 1234)  SpO2: 97 % (10/09/20 1234) Vital Signs (24h Range):  Temp:  [97.9 °F (36.6 °C)-98.6 °F (37 °C)] 98.6 °F (37 °C)  Pulse:  [56-74] 70  Resp:  [18-20] 18  SpO2:  [96 %-100 %] 97 %  BP: (109-132)/(53-66) 114/56     Weight: 66.4 kg (146 lb 6.2 oz)  Body mass index is 25.13 kg/m².    Intake/Output - Last 3 Shifts       10/07 0700 - 10/08 0659 10/08 0700 - 10/09 0659 10/09 0700 - 10/10 0659    P.O.  300     I.V. (mL/kg)  3258.8 (49.1)     IV Piggyback  200     Total Intake(mL/kg)  3758.8 (56.6)     Urine (mL/kg/hr)  3825 (2.4) 100 (0.2)    Blood  20     Total Output  3845 100    Net  -86.3 -100                 Physical Exam  Constitutional:       Appearance: She is well-developed.   HENT:      Head: Normocephalic and atraumatic.   Eyes:      Conjunctiva/sclera: Conjunctivae normal.   Neck:      Musculoskeletal: Normal range of motion.      Thyroid: No thyromegaly.   Cardiovascular:      Rate and Rhythm: Normal rate and regular rhythm.   Pulmonary:      Effort: Pulmonary effort is normal. No respiratory distress.   Abdominal:      Comments: Soft, nondistended, appropriately TTP; incisions c/d/i without erythema or drainage   Musculoskeletal: Normal range of motion.         General:  No tenderness.   Skin:     General: Skin is warm and dry.      Capillary Refill: Capillary refill takes less than 2 seconds.      Findings: No rash.   Neurological:      Mental Status: She is alert and oriented to person, place, and time.         Significant Labs:  CBC:   Recent Labs   Lab 10/09/20  0656   WBC 6.88   RBC 3.51*   HGB 10.5*   HCT 32.9*      MCV 94   MCH 29.9   MCHC 31.9*     BMP:   Recent Labs   Lab 10/09/20  0656   GLU 90      K 4.0      CO2 27   BUN 10   CREATININE 0.8   CALCIUM 8.6*   MG 1.7

## 2020-10-10 VITALS
HEART RATE: 72 BPM | OXYGEN SATURATION: 98 % | SYSTOLIC BLOOD PRESSURE: 147 MMHG | TEMPERATURE: 99 F | HEIGHT: 64 IN | DIASTOLIC BLOOD PRESSURE: 71 MMHG | BODY MASS INDEX: 24.99 KG/M2 | RESPIRATION RATE: 18 BRPM | WEIGHT: 146.38 LBS

## 2020-10-10 LAB
ANION GAP SERPL CALC-SCNC: 7 MMOL/L (ref 8–16)
BASOPHILS # BLD AUTO: 0.02 K/UL (ref 0–0.2)
BASOPHILS NFR BLD: 0.4 % (ref 0–1.9)
BUN SERPL-MCNC: 11 MG/DL (ref 6–20)
CALCIUM SERPL-MCNC: 8.9 MG/DL (ref 8.7–10.5)
CHLORIDE SERPL-SCNC: 105 MMOL/L (ref 95–110)
CO2 SERPL-SCNC: 30 MMOL/L (ref 23–29)
CREAT SERPL-MCNC: 0.7 MG/DL (ref 0.5–1.4)
DIFFERENTIAL METHOD: ABNORMAL
EOSINOPHIL # BLD AUTO: 0 K/UL (ref 0–0.5)
EOSINOPHIL NFR BLD: 0.8 % (ref 0–8)
ERYTHROCYTE [DISTWIDTH] IN BLOOD BY AUTOMATED COUNT: 12.7 % (ref 11.5–14.5)
EST. GFR  (AFRICAN AMERICAN): >60 ML/MIN/1.73 M^2
EST. GFR  (NON AFRICAN AMERICAN): >60 ML/MIN/1.73 M^2
GLUCOSE SERPL-MCNC: 98 MG/DL (ref 70–110)
HCT VFR BLD AUTO: 34.9 % (ref 37–48.5)
HGB BLD-MCNC: 10.9 G/DL (ref 12–16)
IMM GRANULOCYTES # BLD AUTO: 0.02 K/UL (ref 0–0.04)
IMM GRANULOCYTES NFR BLD AUTO: 0.4 % (ref 0–0.5)
LYMPHOCYTES # BLD AUTO: 1.3 K/UL (ref 1–4.8)
LYMPHOCYTES NFR BLD: 25.5 % (ref 18–48)
MAGNESIUM SERPL-MCNC: 1.7 MG/DL (ref 1.6–2.6)
MCH RBC QN AUTO: 29.3 PG (ref 27–31)
MCHC RBC AUTO-ENTMCNC: 31.2 G/DL (ref 32–36)
MCV RBC AUTO: 94 FL (ref 82–98)
MONOCYTES # BLD AUTO: 0.3 K/UL (ref 0.3–1)
MONOCYTES NFR BLD: 6.6 % (ref 4–15)
NEUTROPHILS # BLD AUTO: 3.3 K/UL (ref 1.8–7.7)
NEUTROPHILS NFR BLD: 66.3 % (ref 38–73)
NRBC BLD-RTO: 0 /100 WBC
PHOSPHATE SERPL-MCNC: 2.4 MG/DL (ref 2.7–4.5)
PLATELET # BLD AUTO: 207 K/UL (ref 150–350)
PMV BLD AUTO: 9.3 FL (ref 9.2–12.9)
POTASSIUM SERPL-SCNC: 3.9 MMOL/L (ref 3.5–5.1)
RBC # BLD AUTO: 3.72 M/UL (ref 4–5.4)
SODIUM SERPL-SCNC: 142 MMOL/L (ref 136–145)
WBC # BLD AUTO: 5.01 K/UL (ref 3.9–12.7)

## 2020-10-10 PROCEDURE — 84100 ASSAY OF PHOSPHORUS: CPT

## 2020-10-10 PROCEDURE — 99900035 HC TECH TIME PER 15 MIN (STAT)

## 2020-10-10 PROCEDURE — 83735 ASSAY OF MAGNESIUM: CPT

## 2020-10-10 PROCEDURE — 36415 COLL VENOUS BLD VENIPUNCTURE: CPT

## 2020-10-10 PROCEDURE — 25000003 PHARM REV CODE 250: Performed by: COLON & RECTAL SURGERY

## 2020-10-10 PROCEDURE — 80048 BASIC METABOLIC PNL TOTAL CA: CPT

## 2020-10-10 PROCEDURE — 85025 COMPLETE CBC W/AUTO DIFF WBC: CPT

## 2020-10-10 RX ADMIN — DOCUSATE SODIUM - SENNOSIDES 1 TABLET: 50; 8.6 TABLET, FILM COATED ORAL at 08:10

## 2020-10-10 RX ADMIN — FAMOTIDINE 20 MG: 20 TABLET ORAL at 08:10

## 2020-10-10 RX ADMIN — CHLORHEXIDINE GLUCONATE 0.12% ORAL RINSE 10 ML: 1.2 LIQUID ORAL at 08:10

## 2020-10-10 RX ADMIN — GABAPENTIN 300 MG: 300 CAPSULE ORAL at 08:10

## 2020-10-10 NOTE — PLAN OF CARE
POC reviewed with pt, pt verbalized understanding. IV site c/d/i. Abdominal lap sites x3 intact with dermabond. Pt remains free of fall/trauma. Pt reports passing gas this shift. No reports of n/v. VSS. Purposeful q2h rounding, safety maintained, call light in reach, bed locked and in lowest position, side rails up x2. Will continue to monitor, observe and report any changes.

## 2020-10-10 NOTE — DISCHARGE SUMMARY
[facility] -   General Surgery  Discharge Summary      Patient Name: Jhoana Sierra  MRN: [id number]  Admission Date: 10/8/2020  Hospital Length of Stay: 2 days  Discharge Date and Time: No discharge date for patient encounter.  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 Tolerating reg diet. No BM/flatus yet. Ambulating well. Pain well controlled.   10/10/2020:POD 2.  No particular issues and complaints.  Patient wishes to be discharged.  She was instructed to call her primary surgeon's office on Monday to establish an appointment.      Consults:     Significant Diagnostic Studies: Labs:   BMP:   Recent Labs   Lab 10/09/20  0656 10/10/20  0816   GLU 90 98    142   K 4.0 3.9    105   CO2 27 30*   BUN 10 11   CREATININE 0.8 0.7   CALCIUM 8.6* 8.9   MG 1.7 1.7   , CMP   Recent Labs   Lab 10/09/20  0656 10/10/20  0816    142   K 4.0 3.9    105   CO2 27 30*   GLU 90 98   BUN 10 11   CREATININE 0.8 0.7   CALCIUM 8.6* 8.9   ANIONGAP 8 7*   ESTGFRAFRICA >60 >60   EGFRNONAA >60 >60   , CBC   Recent Labs   Lab 10/09/20  0656 10/10/20  0816   WBC 6.88 5.01   HGB 10.5* 10.9*   HCT 32.9* 34.9*    207    and INR No results found for: INR, PROTIME    Pending Diagnostic Studies:     Procedure Component Value Units Date/Time    Specimen to Pathology, Surgery Gastrointestinal tract [[id number]] Collected: 10/08/20 1130    Order Status: Sent Lab Status:  In process Updated: 10/08/20 3264        Final Active Diagnoses:    Diagnosis Date Noted POA    PRINCIPAL PROBLEM:  Colon adenocarcinoma [C18.9] 10/08/2020 Yes      Problems Resolved During this Admission:      Discharged Condition: good    Disposition: Home or Self Care    Follow Up:    Patient Instructions:   No discharge procedures on file.  Medications:    Reconciled Home Medications:      Medication List      STOP taking these medications    metroNIDAZOLE 500 MG tablet  Commonly known as: FLAGYL     neomycin 500 mg Tab  Commonly known as: MYCIFRADIN     polyethylene glycol 17 gram/dose powder  Commonly known as: GLYCOLAX        ASK your doctor about these medications    ESTROVEN ORAL  Take 1 tablet by mouth once daily.     meloxicam 15 MG tablet  Commonly known as: MOBIC  Take 1 tablet (15 mg total) by mouth daily as needed for Pain.     multivitamin with minerals tablet  Take 1 tablet by mouth once daily.     pseudoephedrine 30 MG tablet  Commonly known as: SUDAFED  Take 30 mg by mouth every 4 (four) hours as needed for Congestion.     tiZANidine 4 MG tablet  Commonly known as: ZANAFLEX  Take 1 tablet (4 mg total) by mouth 3 (three) times daily as needed.          Time spent on the discharge of patient: 10 minutes    Jarret Ramires MD  General Surgery  [facility]

## 2020-10-10 NOTE — NURSING
Pt's discharge instructions and follow up appts given and explained. Verbalized understanding. IV discontinued. Belongings packed at bedside. Will transport out

## 2020-10-12 NOTE — PLAN OF CARE
10/12/20 0737   Final Note   Assessment Type Final Discharge Note   Anticipated Discharge Disposition Home   Right Care Referral Info   Post Acute Recommendation No Care

## 2020-10-15 LAB
FINAL PATHOLOGIC DIAGNOSIS: NORMAL
GROSS: NORMAL
Lab: NORMAL

## 2020-10-26 ENCOUNTER — OFFICE VISIT (OUTPATIENT)
Dept: SURGERY | Facility: CLINIC | Age: 55
End: 2020-10-26
Payer: COMMERCIAL

## 2020-10-26 VITALS
TEMPERATURE: 99 F | WEIGHT: 150.56 LBS | HEART RATE: 72 BPM | DIASTOLIC BLOOD PRESSURE: 82 MMHG | SYSTOLIC BLOOD PRESSURE: 131 MMHG | BODY MASS INDEX: 25.85 KG/M2

## 2020-10-26 DIAGNOSIS — C18.9 ADENOCARCINOMA OF COLON: Primary | ICD-10-CM

## 2020-10-26 PROCEDURE — 99999 PR PBB SHADOW E&M-EST. PATIENT-LVL III: ICD-10-PCS | Mod: PBBFAC,,, | Performed by: COLON & RECTAL SURGERY

## 2020-10-26 PROCEDURE — 99999 PR PBB SHADOW E&M-EST. PATIENT-LVL III: CPT | Mod: PBBFAC,,, | Performed by: COLON & RECTAL SURGERY

## 2020-10-26 PROCEDURE — 99024 POSTOP FOLLOW-UP VISIT: CPT | Mod: S$GLB,,, | Performed by: COLON & RECTAL SURGERY

## 2020-10-26 PROCEDURE — 99024 PR POST-OP FOLLOW-UP VISIT: ICD-10-PCS | Mod: S$GLB,,, | Performed by: COLON & RECTAL SURGERY

## 2020-10-26 NOTE — PROGRESS NOTES
History & Physical    SUBJECTIVE:     CC: rectosigmoid adenocarcinoma  Ref MD: Rianna Dillon MD    History of Present Illness:  Patient is a 55 y.o. 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  Patient had a positive fit test in June 2020 which prompted the scheduling of a colonoscopy.  This colonoscopy was performed in August 2020 and showed a mass in the rectosigmoid region with biopsies resulting in adenocarcinoma.  Patient states that she had seen intermittent blood in her stool for 3 months prior to this.  This was her 1st colonoscopy ever.  She denies any associated fever, chills, nausea, vomiting, diarrhea, constipation, weight loss or night sweats.  She has no known personal family history of colorectal cancer or colonic polyps previously.  Her only significant abdominal surgical history is an open ovarian cyst removal via Pfannenstiel incision.    10/8/2020: Lap LAR (Stage 1: T2N0 adenoCA)    Interval history:  Since last clinic visit, the patient underwent a laparoscopic low anterior resection on 10/08/2020.  She had uneventful recovery in the hospital postoperatively and discharged home.  Since discharge she has done very well.  She is tolerating regular diet and having normal bowel function.  She denies any abdominal pain or hematochezia.  Denies any current fever, chills, nausea, vomiting.  States her incisions have well healed.    Review of patient's allergies indicates:  No Known Allergies    Current Outpatient Medications   Medication Sig Dispense Refill    meloxicam (MOBIC) 15 MG tablet Take 1 tablet (15 mg total) by mouth daily as needed for Pain. 30 tablet 3    multivitamin with minerals tablet Take 1 tablet by mouth once daily.      pseudoephedrine (SUDAFED) 30 MG tablet Take 30 mg by mouth every 4 (four) hours as needed for Congestion.      tiZANidine (ZANAFLEX) 4 MG tablet Take 1 tablet (4 mg total) by mouth 3 (three) times daily as needed. (Patient taking differently:  Take 4 mg by mouth every evening. ) 30 tablet 3    soy isofla/blk cohosh/mag bark (ESTROVEN ORAL) Take 1 tablet by mouth once daily.       No current facility-administered medications for this visit.        Past Medical History:   Diagnosis Date    PONV (postoperative nausea and vomiting)      Past Surgical History:   Procedure Laterality Date    COLONOSCOPY N/A 8/25/2020    Procedure: COLONOSCOPY;  Surgeon: Rianna Dillon MD;  Location: [facility];  Service: Endoscopy;  Laterality: N/A;    FLEXIBLE SIGMOIDOSCOPY N/A 10/8/2020    Procedure: SIGMOIDOSCOPY, FLEXIBLE;  Surgeon: Ziggy Hannah MD;  Location: [facility];  Service: General;  Laterality: N/A;    INJECTION OF ANESTHETIC AGENT INTO TISSUE PLANE DEFINED BY TRANSVERSUS ABDOMINIS MUSCLE N/A 10/8/2020    Procedure: BLOCK, TRANSVERSUS ABDOMINIS PLANE;  Surgeon: Ziggy Hannah MD;  Location: [facility];  Service: General;  Laterality: N/A;    LAPAROSCOPIC SIGMOIDECTOMY N/A 10/8/2020    Procedure: COLECTOMY, SIGMOID, LAPAROSCOPIC;  Surgeon: Ziggy Hannah MD;  Location: [facility];  Service: General;  Laterality: N/A;  lower anterior resection  Proctosigmoidectomy    LAPAROTOMY N/A 10/8/2020    Procedure: LAPAROTOMY;  Surgeon: Ziggy Hannah MD;  Location: [facility];  Service: General;  Laterality: N/A;    OVARIAN CYST REMOVAL      WISDOM TOOTH EXTRACTION       Family History   Problem Relation Age of Onset    Fibromyalgia Sister     Diabetes Neg Hx     Heart disease Neg Hx      Social History     Tobacco Use    Smoking status: Never Smoker    Smokeless tobacco: Never Used   Substance Use Topics    Alcohol use: Yes     Frequency: Monthly or less     Drinks per session: 1 or 2     Binge frequency: Never     Comment: Occ use; HOLD 72HRS PRIOR TO SURGERY    Drug use: No        Review of Systems:  Review of Systems   Constitutional: Negative for activity change, appetite change, chills, fatigue, fever and unexpected weight change.   HENT:  Negative for congestion, ear pain, sore throat and trouble swallowing.    Eyes: Negative for pain, redness and itching.   Respiratory: Negative for cough, shortness of breath and wheezing.    Cardiovascular: Negative for chest pain, palpitations and leg swelling.   Gastrointestinal: Negative for abdominal distention, abdominal pain, blood in stool, constipation, diarrhea, nausea, rectal pain and vomiting.   Endocrine: Negative for cold intolerance, heat intolerance and polyuria.   Genitourinary: Negative for dysuria, flank pain, frequency and hematuria.   Musculoskeletal: Negative for gait problem, joint swelling and neck pain.   Skin: Negative for color change, rash and wound.   Allergic/Immunologic: Negative for environmental allergies and immunocompromised state.   Neurological: Negative for dizziness, speech difficulty, weakness and numbness.   Psychiatric/Behavioral: Negative for agitation, confusion and hallucinations.       OBJECTIVE:     Vital Signs (Most Recent)  Temp: 98.6 °F (37 °C) (10/26/20 1036)  Pulse: 72 (10/26/20 1036)  BP: 131/82 (10/26/20 1036)     68.3 kg (150 lb 9.2 oz)     Physical Exam:  Physical Exam  Constitutional:       Appearance: She is well-developed.   HENT:      Head: Normocephalic and atraumatic.   Eyes:      Conjunctiva/sclera: Conjunctivae normal.   Neck:      Musculoskeletal: Normal range of motion.      Thyroid: No thyromegaly.   Cardiovascular:      Rate and Rhythm: Normal rate and regular rhythm.   Pulmonary:      Effort: Pulmonary effort is normal. No respiratory distress.   Abdominal:      General: There is no distension.      Palpations: Abdomen is soft. There is no mass.      Tenderness: There is no abdominal tenderness.      Comments: Well-healing incisions without erythema or drainage   Musculoskeletal: Normal range of motion.         General: No tenderness.   Skin:     General: Skin is warm and dry.      Capillary Refill: Capillary refill takes less than 2 seconds.       Findings: No rash.   Neurological:      Mental Status: She is alert and oriented to person, place, and time.         Laboratory  Lab Results   Component Value Date    WBC 5.01 10/10/2020    HGB 10.9 (L) 10/10/2020    HCT 34.9 (L) 10/10/2020     10/10/2020    CHOL 206 (H) 06/10/2020    TRIG 88 06/10/2020    HDL 75 06/10/2020    ALT 14 09/09/2020    AST 16 09/09/2020     10/10/2020    K 3.9 10/10/2020     10/10/2020    CREATININE 0.7 10/10/2020    BUN 11 10/10/2020    CO2 30 (H) 10/10/2020    TSH 2.591 06/10/2020    HGBA1C 5.2 06/10/2020       Diagnostic Results:  CT: Reviewed     CT:  FINDINGS:  Bilateral calcified granulomas.  Miniscule nodule in the peripheral left upper lung (see coronal image 92 for example.  This measures 3 mm.  It is also seen on image 133 of series 5.  Heart size is normal.  No pericardial effusion.  Great vessels remain grossly patent.  Trachea and thyroid appear normal.  No pathologically enlarged lymph nodes.  Bones are intact.     No focal hepatic lesion. No gallstones. The spleen and the pancreas are normal. No biliary or pancreatic ductal dilation. Adrenal glands are within normal limits. There is no hydronephrosis. Aorta and IVC are within normal limits.  There are no pathologically enlarged lymph nodes. No ascites. Urinary bladder appears normal.  The uterus is present.  There is a right adnexal cystic lesion seen which measures 3.2 cm.  Further evaluation with ultrasound could be performed.     Stomach and small bowel are normal without evidence of bowel obstruction.  No evidence to suggest appendicitis.  There is masslike area in the rectosigmoid region in this patient with history of rectosigmoid adenocarcinoma.  This area measures on the order of 3.8 cm maximally..     Impression:     Masslike thickening in the rectus sigmoid region correlating to the known malignancy.  Miniscule nodule in the peripheral left upper lung, indeterminate but likely incidental.,  Right adnexal cyst for which further evaluation with ultrasound could be performed, and other findings as above.      PATHOLOGY from Lap LAR Oct 2020:  1. SIGMOID COLON AND UPPER RECTUM, EXCISION:   Adenocarcinoma, well-differentiated   Margins negative   Sixteen benign lymph nodes (0/16)   2. ANASTOMOTIC DONUTS:   Benign colonic mucosa   SYNOPTIC REPORT   Specimen - Sigmoid colon and upper rectum   Tumor Site - Sigmoid colon   Tumor Size - 5 x 3.1cm   Macroscopic Tumor Perforation - Not Identified   Histologic Type - Adenocarcinoma   Histologic Grade - Well-differentiated   Microscopic Tumor Extension - Tumor invades muscularis propria (but not   through it)   Margins - Proximal, distal and radial are all uninvolved by Intraepithelial   Neoplasia/Dysplasia   Treatment Effect - Not applicable   Lymphovascular Invasion - Not Identified   Perineural Invasion - Not Identified   Tumor Deposits - Not Identified   Pathologic Staging - p T2 N0 Mx   Number of Lymph Nodes Examined - 16   Number of Lymph Nodes Involved - 0   Tumor block - 1D   Non-tumor block - 1B   Ancillary studies -   Immunohistochemistry (IHC) Testing for Mismatch Repair (MMR) Proteins:    MLH1 - Intact nuclear expression    MSH2 - Intact nuclear expression    MSH6 - Intact nuclear expression    PMS2 - Intact nuclear expression    Background nonneoplastic tissue/internal control with intact nuclear   expression    IHC Interpretation    No loss of nuclear expression of MMR proteins: low probability of   microsatellite instability   There are exceptions to the above IHC interpretations. These results should   not be considered in isolation, and clinical correlation with genetic   counseling is recommended to assess the need for germline testing.     ASSESSMENT/PLAN:     56yo F with rectosigmoid adenocarcinoma with negative workup for metastatic disease who is now s/p lap LAR on 10/8/2020 with final path showing Stage 1: T2N0 adenocarcinoma    - no further  interventions required at this time  - needs surveillance via q3-6 months physical exam, CEAs, yearly CT and colonoscopy at 1yr from surgery  - will see HemeOnc in follow   - RTC in 3 months for surveillance      Ziggy Hannah MD  Colon and Rectal Surgery  [facility]

## 2020-11-12 NOTE — PROGRESS NOTES
Subjective:   Date of Visit: 11/13/20   ?   ?    REFERRING PROVIDER: No referring provider defined for this encounter.   ?   CHIEF COMPLAINT: No chief complaint on file.  ???????   ?   ONCOLOGIC DIAGNOSIS:    FINAL PATHOLOGY DIAGNOSIS:  09/02/2020    RECTOSIGMOID MASS, BIOPSY:   Adenocarcinoma   The biopsy shows a single minute fragment showing infiltrative gland in the stroma in the backgrounf of multiple fragments of high-grade dysplastic colonic mucosa. Findings are in keeping with adenocarcinoma.   Tumor is extremely limited for a meaningful interpretation of MMR-IHC and it's recommended to be performed on the resection specimen.  ?     HPI:  55-year-old female with past medical history significant for chronic allergy was seen by her PCP on 06/08/2020 for wellness exam.  At that time she was advised to obtain screening colonoscopy given positive iFOBT and mammographic breast evaluation.    Screening colonoscopy was performed on 08/25/2020 and a partially obstructing tumor in the rectosigmoid colon was found and biopsied.  The biopsy showed a single minute fragment showing infiltrative gland in the stroma in the background of multiple fragments of high grade dysplastic colonic mucosa, keeping with adenocarcinoma.  Given the limited nature of the sample, immunohistochemistry for MMR status was not performed.    She was referred to follow-up with oncology to discuss management options.  Today she denies melena or hematochezia.  She denies unintentional weight loss, nausea or vomiting, abdominal pain, diarrhea or dysuria.    Family history significant for maternal grandfather with lymphoma.  No other pertinent family history.      Review of Systems   Constitutional: Negative for activity change, appetite change, chills, fatigue, fever and unexpected weight change.   HENT: Negative for hearing loss, mouth sores, nosebleeds, sore throat, tinnitus, trouble swallowing and voice change.    Eyes: Negative for visual  disturbance.   Respiratory: Negative for cough, chest tightness and shortness of breath.    Cardiovascular: Negative for chest pain, palpitations and leg swelling.   Gastrointestinal: Negative for abdominal pain, anal bleeding, blood in stool, constipation, diarrhea, nausea and vomiting.   Genitourinary: Negative for dysuria, frequency, hematuria, pelvic pain, vaginal bleeding and vaginal pain.   Musculoskeletal: Negative for arthralgias, back pain, joint swelling and neck pain.   Skin: Negative for color change, pallor, rash and wound.   Allergic/Immunologic: Negative for immunocompromised state.   Neurological: Negative for dizziness, tremors, syncope, speech difficulty, weakness, light-headedness and headaches.   Hematological: Negative for adenopathy. Does not bruise/bleed easily.   Psychiatric/Behavioral: Negative for agitation, confusion, decreased concentration, hallucinations and sleep disturbance. The patient is not nervous/anxious.        ?   PAST MEDICAL HISTORY:   Past Medical History:   Diagnosis Date    PONV (postoperative nausea and vomiting)     ?     PAST SURGICAL HISTORY:   Past Surgical History:   Procedure Laterality Date    COLONOSCOPY N/A 8/25/2020    Procedure: COLONOSCOPY;  Surgeon: Rianna Dillon MD;  Location: [facility];  Service: Endoscopy;  Laterality: N/A;    FLEXIBLE SIGMOIDOSCOPY N/A 10/8/2020    Procedure: SIGMOIDOSCOPY, FLEXIBLE;  Surgeon: Ziggy Hannah MD;  Location: [facility];  Service: General;  Laterality: N/A;    INJECTION OF ANESTHETIC AGENT INTO TISSUE PLANE DEFINED BY TRANSVERSUS ABDOMINIS MUSCLE N/A 10/8/2020    Procedure: BLOCK, TRANSVERSUS ABDOMINIS PLANE;  Surgeon: Ziggy Hannah MD;  Location: [facility] OR;  Service: General;  Laterality: N/A;    LAPAROSCOPIC SIGMOIDECTOMY N/A 10/8/2020    Procedure: COLECTOMY, SIGMOID, LAPAROSCOPIC;  Surgeon: Ziggy Hannah MD;  Location: [facility] OR;  Service: General;  Laterality: N/A;  lower anterior  resection  Proctosigmoidectomy    LAPAROTOMY N/A 10/8/2020    Procedure: LAPAROTOMY;  Surgeon: Ziggy Hannah MD;  Location: [facility];  Service: General;  Laterality: N/A;    OVARIAN CYST REMOVAL      WISDOM TOOTH EXTRACTION        ?   ALLERGIES:   Allergies as of 11/13/2020    (No Known Allergies)      ?   MEDICATIONS:?   Outpatient Medications Marked as Taking for the 11/13/20 encounter (Office Visit) with Duc Stuart MD   Medication Sig Dispense Refill    meloxicam (MOBIC) 15 MG tablet Take 1 tablet (15 mg total) by mouth daily as needed for Pain. 30 tablet 3    multivitamin with minerals tablet Take 1 tablet by mouth once daily.      pseudoephedrine (SUDAFED) 30 MG tablet Take 30 mg by mouth every 4 (four) hours as needed for Congestion.      tiZANidine (ZANAFLEX) 4 MG tablet Take 1 tablet (4 mg total) by mouth 3 (three) times daily as needed. (Patient taking differently: Take 4 mg by mouth every evening. ) 30 tablet 3      ?   SOCIAL HISTORY:?   Social History     Tobacco Use    Smoking status: Never Smoker    Smokeless tobacco: Never Used   Substance Use Topics    Alcohol use: Yes     Frequency: Monthly or less     Drinks per session: 1 or 2     Binge frequency: Never     Comment: Occ use; HOLD 72HRS PRIOR TO SURGERY        ?   FAMILY HISTORY:   family history includes Fibromyalgia in her sister.   ?     Objective:      Physical Exam  Constitutional:       General: She is not in acute distress.     Appearance: She is well-developed. She is not ill-appearing or toxic-appearing.   HENT:      Head: Normocephalic and atraumatic.      Mouth/Throat:      Pharynx: No oropharyngeal exudate.   Eyes:      General: No scleral icterus.        Right eye: No discharge.         Left eye: No discharge.      Conjunctiva/sclera: Conjunctivae normal.      Pupils: Pupils are equal, round, and reactive to light.   Neck:      Musculoskeletal: Normal range of motion and neck supple.      Thyroid: No  thyromegaly.   Cardiovascular:      Rate and Rhythm: Normal rate and regular rhythm.      Heart sounds: No murmur.   Pulmonary:      Effort: Pulmonary effort is normal. No respiratory distress.      Breath sounds: Normal breath sounds.   Chest:      Chest wall: No tenderness.   Abdominal:      General: Bowel sounds are normal. There is no distension.      Palpations: Abdomen is soft. There is no mass.      Tenderness: There is no abdominal tenderness. There is no guarding or rebound.   Musculoskeletal: Normal range of motion.         General: No tenderness.   Lymphadenopathy:      Cervical: No cervical adenopathy.      Right cervical: No superficial cervical adenopathy.     Left cervical: No superficial cervical adenopathy.      Upper Body:      Right upper body: No supraclavicular or pectoral adenopathy.      Left upper body: No supraclavicular or pectoral adenopathy.   Skin:     General: Skin is warm and dry.      Capillary Refill: Capillary refill takes 2 to 3 seconds.      Coloration: Skin is not pale.      Findings: No erythema or rash.   Neurological:      Mental Status: She is alert and oriented to person, place, and time.      Cranial Nerves: No cranial nerve deficit.      Sensory: No sensory deficit.   Psychiatric:         Behavior: Behavior normal. Behavior is cooperative.         Judgment: Judgment normal.         ?   Vitals:    11/13/20 0921   BP: 126/88   Pulse: 66   Resp: 17   Temp: 98.5 °F (36.9 °C)      ?     ECOG SCORE             Laboratory:  ?   No visits with results within 1 Day(s) from this visit.   Latest known visit with results is:   Admission on 10/08/2020, Discharged on 10/10/2020   Component Date Value Ref Range Status    Final Pathologic Diagnosis 10/08/2020    Final                    Value:1. SIGMOID COLON AND UPPER RECTUM, EXCISION:  Adenocarcinoma, well-differentiated  Margins negative  Sixteen benign lymph nodes (0/16)  2. ANASTOMOTIC DONUTS:  Benign colonic mucosa  SYNOPTIC  "REPORT  Specimen - Sigmoid colon and upper rectum  Tumor Site - Sigmoid colon  Tumor Size - 5 x 3.1cm  Macroscopic Tumor Perforation - Not Identified  Histologic Type - Adenocarcinoma  Histologic Grade - Well-differentiated  Microscopic Tumor Extension - Tumor invades muscularis propria (but not  through it)  Margins - Proximal, distal and radial are all uninvolved by Intraepithelial  Neoplasia/Dysplasia  Treatment Effect - Not applicable  Lymphovascular Invasion - Not Identified  Perineural Invasion - Not Identified  Tumor Deposits - Not Identified  Pathologic Staging - p T2 N0 Mx  Number of Lymph Nodes Examined - 16  Number of Lymph Nodes Involved - 0  Tumor block - 1D  Non-tumor block - 1B  Ancillary studies -  Immunohistochemistry (IHC) Testing for Mismatch Repair (MMR) Proteins:   MLH1 - Inta                          ct nuclear expression   MSH2 - Intact nuclear expression   MSH6 - Intact nuclear expression   PMS2 - Intact nuclear expression   Background nonneoplastic tissue/internal control with intact nuclear  expression   IHC Interpretation   No loss of nuclear expression of MMR proteins: low probability of  microsatellite instability  There are exceptions to the above IHC interpretations. These results should  not be considered in isolation, and clinical correlation with genetic  counseling is recommended to assess the need for germline testing.      Gross 10/08/2020    Final                    Value:1:  Surgery ID:  [id number];  Pathology ID:  [id number]  1.  Received in formalin labeled "sigmoid colon upper rectum" is a 11.5 x 3  cm segment of large bowel with 1 open margin and 1 stapled margin.  The  serosal is pale tan and smooth with a 2.5 cm area with serosal puckering and  tattoo ink.  This area is marked with blue ink.  Sectioning reveals a 5 x 3.1  cm tan-pink polypoid mass, which lies 3 cm from the open margin, 3.5 cm from  the stapled margin, and 5.5 cm from the radial margin.  The lesion " "grossly  involves the muscularis propria.  Sectioning the fat reveals multiple  candidate lymph nodes, 0.1-0.8 cm in greatest dimension.  Representative  sections are embedded in cassettes 4694, 1A-1K.  1A:  Open margin  1B:  Staple margin  1C:  Radial margin, shave  1D-1E:  Lesion, deepest invasion  1F:  Lesion  1G:  Lesion to normal  1H:  4 candidate lymph nodes  1I:  4 candidate lymph nodes  1J:  4 candidate lymph nodes  1K:  5 candidate lymph nodes  Grossed by: Mitul Thayer   2: Surgery I                          D:  [id number];  Pathology ID:  [id number]  2.  Received in formalin labeled "anastomotic donuts" are 2 rings of mucosa,  1.5 x 1.1 x 0.5 cm, and 1.9 x 1.2 x 0.6 cm.  Representative sections are  embedded in cassette 4694, 2A.  Grossed by: Mitul Thayer      Disclaimer 10/08/2020    Final                    Value:Unless the case is a 'gross only' or additional testing only, the final  diagnosis for each specimen is based on a microscopic examination of  appropriate tissue sections.      Sodium 10/09/2020 141  136 - 145 mmol/L Final    Potassium 10/09/2020 4.0  3.5 - 5.1 mmol/L Final    Chloride 10/09/2020 106  95 - 110 mmol/L Final    CO2 10/09/2020 27  23 - 29 mmol/L Final    Glucose 10/09/2020 90  70 - 110 mg/dL Final    BUN 10/09/2020 10  6 - 20 mg/dL Final    Creatinine 10/09/2020 0.8  0.5 - 1.4 mg/dL Final    Calcium 10/09/2020 8.6* 8.7 - 10.5 mg/dL Final    Anion Gap 10/09/2020 8  8 - 16 mmol/L Final    eGFR if African American 10/09/2020 >60  >60 mL/min/1.73 m^2 Final    eGFR if non African American 10/09/2020 >60  >60 mL/min/1.73 m^2 Final    WBC 10/09/2020 6.88  3.90 - 12.70 K/uL Final    RBC 10/09/2020 3.51* 4.00 - 5.40 M/uL Final    Hemoglobin 10/09/2020 10.5* 12.0 - 16.0 g/dL Final    Hematocrit 10/09/2020 32.9* 37.0 - 48.5 % Final    MCV 10/09/2020 94  82 - 98 fL Final    MCH 10/09/2020 29.9  27.0 - 31.0 pg Final    MCHC 10/09/2020 31.9* 32.0 - 36.0 g/dL Final    RDW 10/09/2020 " 12.4  11.5 - 14.5 % Final    Platelets 10/09/2020 199  150 - 350 K/uL Final    MPV 10/09/2020 9.2  9.2 - 12.9 fL Final    Immature Granulocytes 10/09/2020 0.3  0.0 - 0.5 % Final    Gran # (ANC) 10/09/2020 5.2  1.8 - 7.7 K/uL Final    Immature Grans (Abs) 10/09/2020 0.02  0.00 - 0.04 K/uL Final    Lymph # 10/09/2020 1.0  1.0 - 4.8 K/uL Final    Mono # 10/09/2020 0.6  0.3 - 1.0 K/uL Final    Eos # 10/09/2020 0.0  0.0 - 0.5 K/uL Final    Baso # 10/09/2020 0.01  0.00 - 0.20 K/uL Final    nRBC 10/09/2020 0  0 /100 WBC Final    Gran % 10/09/2020 76.1* 38.0 - 73.0 % Final    Lymph % 10/09/2020 14.4* 18.0 - 48.0 % Final    Mono % 10/09/2020 9.0  4.0 - 15.0 % Final    Eosinophil % 10/09/2020 0.1  0.0 - 8.0 % Final    Basophil % 10/09/2020 0.1  0.0 - 1.9 % Final    Differential Method 10/09/2020 Automated   Final    Phosphorus 10/09/2020 3.0  2.7 - 4.5 mg/dL Final    Magnesium 10/09/2020 1.7  1.6 - 2.6 mg/dL Final    Sodium 10/10/2020 142  136 - 145 mmol/L Final    Potassium 10/10/2020 3.9  3.5 - 5.1 mmol/L Final    Chloride 10/10/2020 105  95 - 110 mmol/L Final    CO2 10/10/2020 30* 23 - 29 mmol/L Final    Glucose 10/10/2020 98  70 - 110 mg/dL Final    BUN 10/10/2020 11  6 - 20 mg/dL Final    Creatinine 10/10/2020 0.7  0.5 - 1.4 mg/dL Final    Calcium 10/10/2020 8.9  8.7 - 10.5 mg/dL Final    Anion Gap 10/10/2020 7* 8 - 16 mmol/L Final    eGFR if African American 10/10/2020 >60  >60 mL/min/1.73 m^2 Final    eGFR if non African American 10/10/2020 >60  >60 mL/min/1.73 m^2 Final    WBC 10/10/2020 5.01  3.90 - 12.70 K/uL Final    RBC 10/10/2020 3.72* 4.00 - 5.40 M/uL Final    Hemoglobin 10/10/2020 10.9* 12.0 - 16.0 g/dL Final    Hematocrit 10/10/2020 34.9* 37.0 - 48.5 % Final    MCV 10/10/2020 94  82 - 98 fL Final    MCH 10/10/2020 29.3  27.0 - 31.0 pg Final    MCHC 10/10/2020 31.2* 32.0 - 36.0 g/dL Final    RDW 10/10/2020 12.7  11.5 - 14.5 % Final    Platelets 10/10/2020 207  150 - 350  K/uL Final    MPV 10/10/2020 9.3  9.2 - 12.9 fL Final    Immature Granulocytes 10/10/2020 0.4  0.0 - 0.5 % Final    Gran # (ANC) 10/10/2020 3.3  1.8 - 7.7 K/uL Final    Immature Grans (Abs) 10/10/2020 0.02  0.00 - 0.04 K/uL Final    Lymph # 10/10/2020 1.3  1.0 - 4.8 K/uL Final    Mono # 10/10/2020 0.3  0.3 - 1.0 K/uL Final    Eos # 10/10/2020 0.0  0.0 - 0.5 K/uL Final    Baso # 10/10/2020 0.02  0.00 - 0.20 K/uL Final    nRBC 10/10/2020 0  0 /100 WBC Final    Gran % 10/10/2020 66.3  38.0 - 73.0 % Final    Lymph % 10/10/2020 25.5  18.0 - 48.0 % Final    Mono % 10/10/2020 6.6  4.0 - 15.0 % Final    Eosinophil % 10/10/2020 0.8  0.0 - 8.0 % Final    Basophil % 10/10/2020 0.4  0.0 - 1.9 % Final    Differential Method 10/10/2020 Automated   Final    Phosphorus 10/10/2020 2.4* 2.7 - 4.5 mg/dL Final    Magnesium 10/10/2020 1.7  1.6 - 2.6 mg/dL Final      ?   Tumor markers   ?   ?   Imaging: CT Chest Abdomen Pelvis With Contrast  Narrative: EXAMINATION:  CT CHEST ABDOMEN PELVIS WITH CONTRAST (XPD)    CLINICAL HISTORY:  known rectosigmoid adenocarcinoma; Malignant neoplasm of colon, unspecified    TECHNIQUE:  Low dose axial images, sagittal and coronal reformations were obtained from the thoracic inlet to the pubic symphysis following the IV administration of 100 ML of Omnipaque 350 30 cc oral Omnipaque 350 was also consumed.    COMPARISON:  None    FINDINGS:  Bilateral calcified granulomas.  Miniscule nodule in the peripheral left upper lung (see coronal image 92 for example.  This measures 3 mm.  It is also seen on image 133 of series 5.  Heart size is normal.  No pericardial effusion.  Great vessels remain grossly patent.  Trachea and thyroid appear normal.  No pathologically enlarged lymph nodes.  Bones are intact.    No focal hepatic lesion. No gallstones. The spleen and the pancreas are normal. No biliary or pancreatic ductal dilation. Adrenal glands are within normal limits. There is no hydronephrosis.  Aorta and IVC are within normal limits.  There are no pathologically enlarged lymph nodes. No ascites. Urinary bladder appears normal.  The uterus is present.  There is a right adnexal cystic lesion seen which measures 3.2 cm.  Further evaluation with ultrasound could be performed.    Stomach and small bowel are normal without evidence of bowel obstruction.  No evidence to suggest appendicitis.  There is masslike area in the rectosigmoid region in this patient with history of rectosigmoid adenocarcinoma.  This area measures on the order of 3.8 cm maximally..  Impression: Masslike thickening in the rectus sigmoid region correlating to the known malignancy.  Miniscule nodule in the peripheral left upper lung, indeterminate but likely incidental., Right adnexal cyst for which further evaluation with ultrasound could be performed, and other findings as above.    Electronically signed by: Julio Cesar Negro MD  Date:    09/09/2020  Time:    13:52     ?      Pathology:  Pathology Results  (Last 10 years)               10/08/20 1130  Specimen to Pathology, Surgery Gastrointestinal tract Final result    Narrative:  Pre-op Diagnosis: Colon adenocarcinoma [C18.9]   Procedure(s):   LAPAROSCOPIC SIGMOID COLECTOMY   BLOCK, TRANSVERSUS ABDOMINIS PLANE   MOBILIZATION, SPLENIC FLEXURE   Number of specimens: 2   Name of specimens: 1. Sigmoid colon and upper rectum (perm)   2.  Anastomotic donuts   (perm)   Specimen total (fresh, frozen, permanent):->2       08/25/20 1024  Specimen to Pathology, Surgery Gastrointestinal tract Final result    Narrative:  Pre-op Diagnosis: Screening [Z13.9]   Procedure(s):   COLONOSCOPY   Number of specimens: 1   Name of specimens:   #1 Rectosigmoid Mass BX's   Specimen total (fresh, frozen, permanent):->1              ?   Assessment/Plan:       1. Anemia, unspecified type    2. Adenocarcinoma of colon          ?Adenocarcinoma of colon  Stage I (pT2, pN0, cM0) colon adenocarcinoma, status post laparoscopic  low anterior resection on 10/08/2020.  Discussed with patient that there is no role for adjuvant systemic therapy.  However will continue surveillance every 3 months with physical exams, CEAs and yearly CT scan.  She will also need colonoscopy at 1 year from surgery.  Reviewed labs, no concerning cytopenia, return back in 3 months with repeat labs or sooner if needed.     Screening mammography:  Up-to-date per patient.  Most recent in June of 2020 at [facility].  Will have her sign release of information form to obtain records.      Follow-Up: Follow up in about 3 months (around 2/13/2021).    MONIKA VAUGHN Md., Ph.D  Hematology & Oncology Department  Phone #: [phone]

## 2020-11-12 NOTE — ASSESSMENT & PLAN NOTE
Stage I (pT2, pN0, cM0) colon adenocarcinoma, status post laparoscopic low anterior resection on 10/08/2020.  Discussed with patient that there is no role for adjuvant systemic therapy.  However will continue surveillance every 3 months with physical exams, CEAs and yearly CT scan.  She will also need colonoscopy at 1 year from surgery.  Reviewed labs, no concerning cytopenia, return back in 3 months with repeat labs or sooner if needed.

## 2020-11-13 ENCOUNTER — OFFICE VISIT (OUTPATIENT)
Dept: HEMATOLOGY/ONCOLOGY | Facility: CLINIC | Age: 55
End: 2020-11-13
Payer: COMMERCIAL

## 2020-11-13 ENCOUNTER — LAB VISIT (OUTPATIENT)
Dept: LAB | Facility: HOSPITAL | Age: 55
End: 2020-11-13
Attending: INTERNAL MEDICINE
Payer: COMMERCIAL

## 2020-11-13 VITALS
HEIGHT: 64 IN | DIASTOLIC BLOOD PRESSURE: 88 MMHG | SYSTOLIC BLOOD PRESSURE: 126 MMHG | HEART RATE: 66 BPM | BODY MASS INDEX: 25.9 KG/M2 | WEIGHT: 151.69 LBS | RESPIRATION RATE: 17 BRPM | OXYGEN SATURATION: 97 % | TEMPERATURE: 99 F

## 2020-11-13 DIAGNOSIS — C18.9 ADENOCARCINOMA OF COLON: ICD-10-CM

## 2020-11-13 DIAGNOSIS — D64.9 ANEMIA, UNSPECIFIED TYPE: ICD-10-CM

## 2020-11-13 DIAGNOSIS — D64.9 ANEMIA, UNSPECIFIED TYPE: Primary | ICD-10-CM

## 2020-11-13 LAB
ALBUMIN SERPL BCP-MCNC: 4.4 G/DL (ref 3.5–5.2)
ALP SERPL-CCNC: 80 U/L (ref 55–135)
ALT SERPL W/O P-5'-P-CCNC: 23 U/L (ref 10–44)
ANION GAP SERPL CALC-SCNC: 8 MMOL/L (ref 8–16)
AST SERPL-CCNC: 20 U/L (ref 10–40)
BILIRUB SERPL-MCNC: 0.4 MG/DL (ref 0.1–1)
BUN SERPL-MCNC: 17 MG/DL (ref 6–20)
CALCIUM SERPL-MCNC: 10.1 MG/DL (ref 8.7–10.5)
CHLORIDE SERPL-SCNC: 102 MMOL/L (ref 95–110)
CO2 SERPL-SCNC: 30 MMOL/L (ref 23–29)
CREAT SERPL-MCNC: 0.8 MG/DL (ref 0.5–1.4)
ERYTHROCYTE [DISTWIDTH] IN BLOOD BY AUTOMATED COUNT: 12.6 % (ref 11.5–14.5)
EST. GFR  (AFRICAN AMERICAN): >60 ML/MIN/1.73 M^2
EST. GFR  (NON AFRICAN AMERICAN): >60 ML/MIN/1.73 M^2
GLUCOSE SERPL-MCNC: 89 MG/DL (ref 70–110)
HCT VFR BLD AUTO: 38.7 % (ref 37–48.5)
HGB BLD-MCNC: 12.4 G/DL (ref 12–16)
IMM GRANULOCYTES # BLD AUTO: 0 K/UL (ref 0–0.04)
MCH RBC QN AUTO: 30 PG (ref 27–31)
MCHC RBC AUTO-ENTMCNC: 32 G/DL (ref 32–36)
MCV RBC AUTO: 94 FL (ref 82–98)
NEUTROPHILS # BLD AUTO: 1.9 K/UL (ref 1.8–7.7)
PLATELET # BLD AUTO: 229 K/UL (ref 150–350)
PMV BLD AUTO: 9.7 FL (ref 9.2–12.9)
POTASSIUM SERPL-SCNC: 4.3 MMOL/L (ref 3.5–5.1)
PROT SERPL-MCNC: 7.8 G/DL (ref 6–8.4)
RBC # BLD AUTO: 4.13 M/UL (ref 4–5.4)
SODIUM SERPL-SCNC: 140 MMOL/L (ref 136–145)
WBC # BLD AUTO: 3.43 K/UL (ref 3.9–12.7)

## 2020-11-13 PROCEDURE — 1126F PR PAIN SEVERITY QUANTIFIED, NO PAIN PRESENT: ICD-10-PCS | Mod: S$GLB,,, | Performed by: INTERNAL MEDICINE

## 2020-11-13 PROCEDURE — 3008F PR BODY MASS INDEX (BMI) DOCUMENTED: ICD-10-PCS | Mod: CPTII,S$GLB,, | Performed by: INTERNAL MEDICINE

## 2020-11-13 PROCEDURE — 99999 PR PBB SHADOW E&M-EST. PATIENT-LVL III: CPT | Mod: PBBFAC,,, | Performed by: INTERNAL MEDICINE

## 2020-11-13 PROCEDURE — 82378 CARCINOEMBRYONIC ANTIGEN: CPT

## 2020-11-13 PROCEDURE — 85027 COMPLETE CBC AUTOMATED: CPT

## 2020-11-13 PROCEDURE — 99999 PR PBB SHADOW E&M-EST. PATIENT-LVL III: ICD-10-PCS | Mod: PBBFAC,,, | Performed by: INTERNAL MEDICINE

## 2020-11-13 PROCEDURE — 3008F BODY MASS INDEX DOCD: CPT | Mod: CPTII,S$GLB,, | Performed by: INTERNAL MEDICINE

## 2020-11-13 PROCEDURE — 99215 OFFICE O/P EST HI 40 MIN: CPT | Mod: S$GLB,,, | Performed by: INTERNAL MEDICINE

## 2020-11-13 PROCEDURE — 80053 COMPREHEN METABOLIC PANEL: CPT

## 2020-11-13 PROCEDURE — 1126F AMNT PAIN NOTED NONE PRSNT: CPT | Mod: S$GLB,,, | Performed by: INTERNAL MEDICINE

## 2020-11-13 PROCEDURE — 99215 PR OFFICE/OUTPT VISIT, EST, LEVL V, 40-54 MIN: ICD-10-PCS | Mod: S$GLB,,, | Performed by: INTERNAL MEDICINE

## 2020-11-13 PROCEDURE — 36415 COLL VENOUS BLD VENIPUNCTURE: CPT | Mod: PO

## 2020-11-16 ENCOUNTER — PATIENT OUTREACH (OUTPATIENT)
Dept: ADMINISTRATIVE | Facility: OTHER | Age: 55
End: 2020-11-16

## 2020-11-17 ENCOUNTER — OFFICE VISIT (OUTPATIENT)
Dept: DERMATOLOGY | Facility: CLINIC | Age: 55
End: 2020-11-17
Payer: COMMERCIAL

## 2020-11-17 DIAGNOSIS — L81.4 SOLAR LENTIGO: ICD-10-CM

## 2020-11-17 DIAGNOSIS — D22.9 MULTIPLE BENIGN NEVI: ICD-10-CM

## 2020-11-17 DIAGNOSIS — Z12.83 SKIN CANCER SCREENING: Primary | ICD-10-CM

## 2020-11-17 DIAGNOSIS — L82.1 SEBORRHEIC KERATOSES: ICD-10-CM

## 2020-11-17 DIAGNOSIS — D18.00 HEMANGIOMA, UNSPECIFIED SITE: ICD-10-CM

## 2020-11-17 PROCEDURE — 99203 PR OFFICE/OUTPT VISIT, NEW, LEVL III, 30-44 MIN: ICD-10-PCS | Mod: S$GLB,,, | Performed by: STUDENT IN AN ORGANIZED HEALTH CARE EDUCATION/TRAINING PROGRAM

## 2020-11-17 PROCEDURE — 99999 PR PBB SHADOW E&M-EST. PATIENT-LVL II: ICD-10-PCS | Mod: PBBFAC,,, | Performed by: STUDENT IN AN ORGANIZED HEALTH CARE EDUCATION/TRAINING PROGRAM

## 2020-11-17 PROCEDURE — 99203 OFFICE O/P NEW LOW 30 MIN: CPT | Mod: S$GLB,,, | Performed by: STUDENT IN AN ORGANIZED HEALTH CARE EDUCATION/TRAINING PROGRAM

## 2020-11-17 PROCEDURE — 99999 PR PBB SHADOW E&M-EST. PATIENT-LVL II: CPT | Mod: PBBFAC,,, | Performed by: STUDENT IN AN ORGANIZED HEALTH CARE EDUCATION/TRAINING PROGRAM

## 2020-11-17 NOTE — PROGRESS NOTES
Subjective:       Patient ID:  Jhoana Sierra is a 55 y.o. female who presents for   Chief Complaint   Patient presents with    Skin Check     History of Present Illness: The patient presents with chief complaint of skin check.  Patient denies personal hx of skin cancers. Denies family hx of melanoma. Patient endorses hx of strong sun exposure. Denies any bleeding, ulcerating, or nonhealing lesions today.         Review of Systems   Skin: Positive for activity-related sunscreen use. Negative for itching, rash, daily sunscreen use and recent sunburn.   Hematologic/Lymphatic: Bruises/bleeds easily.        Objective:    Physical Exam   Constitutional: She appears well-developed and well-nourished. No distress.   Neurological: She is alert and oriented to person, place, and time. She is not disoriented.   Psychiatric: She has a normal mood and affect.   Skin:   Areas Examined (abnormalities noted in diagram):   Scalp / Hair Palpated and Inspected  Head / Face Inspection Performed  Neck Inspection Performed  Chest / Axilla Inspection Performed  Abdomen Inspection Performed  Genitals / Buttocks / Groin Inspection Performed  Back Inspection Performed  RUE Inspected  LUE Inspection Performed  RLE Inspected  LLE Inspection Performed  Nails and Digits Inspection Performed                   Diagram Legend     Erythematous scaling macule/papule c/w actinic keratosis       Vascular papule c/w angioma      Pigmented verrucoid papule/plaque c/w seborrheic keratosis      Yellow umbilicated papule c/w sebaceous hyperplasia      Irregularly shaped tan macule c/w lentigo     1-2 mm smooth white papules consistent with Milia      Movable subcutaneous cyst with punctum c/w epidermal inclusion cyst      Subcutaneous movable cyst c/w pilar cyst      Firm pink to brown papule c/w dermatofibroma      Pedunculated fleshy papule(s) c/w skin tag(s)      Evenly pigmented macule c/w junctional nevus     Mildly variegated pigmented, slightly  irregular-bordered macule c/w mildly atypical nevus      Flesh colored to evenly pigmented papule c/w intradermal nevus       Pink pearly papule/plaque c/w basal cell carcinoma      Erythematous hyperkeratotic cursted plaque c/w SCC      Surgical scar with no sign of skin cancer recurrence      Open and closed comedones      Inflammatory papules and pustules      Verrucoid papule consistent consistent with wart     Erythematous eczematous patches and plaques     Dystrophic onycholytic nail with subungual debris c/w onychomycosis     Umbilicated papule    Erythematous-base heme-crusted tan verrucoid plaque consistent with inflamed seborrheic keratosis     Erythematous Silvery Scaling Plaque c/w Psoriasis     See annotation      Assessment / Plan:        Skin cancer screening  Total body skin examination performed today including at least 12 points as noted in physical examination. No lesions suspicious for malignancy noted.    Solar lentigo  This is a benign hyperpigmented sun induced lesion. Daily sun protection will reduce the number of new lesions. Treatment of these benign lesions are considered cosmetic.    Hemangioma, unspecified site  This is a benign vascular lesion. Reassurance given. No treatment required.     Seborrheic keratoses  These are benign inherited growths without a malignant potential. Reassurance given to patient. No treatment is necessary.     Multiple benign nevi  Discussed ABCDE's of nevi.  Monitor for new mole or moles that are becoming bigger, darker, irritated, or developing irregular borders. Brochure provided.             Follow up in about 1 year (around 11/17/2021).

## 2020-11-17 NOTE — LETTER
November 17, 2020      Dhruv Nguyen MD  [address] [place] [address]           [facility]  [address]  [place] [address]  Phone: [phone]  Fax: [phone]          Patient: Jhoana Sierra   MR Number: [id number]   YOB: 1965   Date of Visit: 11/17/2020       Dear Dr. Dhruv Nguyen:    Thank you for referring Jhoana Sierra to me for evaluation. Attached you will find relevant portions of my assessment and plan of care.    If you have questions, please do not hesitate to call me. I look forward to following Jhoana Sierra along with you.    Sincerely,    Deja Pulliam MD    Enclosure  CC:  No Recipients    If you would like to receive this communication electronically, please contact [email or web address] or [phone] to request more information on [email or web address] Link access.    For providers and/or their staff who would like to refer a patient to [facility], please contact us through our one-stop-shop provider referral line, [facility], at [phone].    If you feel you have received this communication in error or would no longer like to receive these types of communications, please e-mail [email or web address]

## 2020-11-21 ENCOUNTER — PATIENT MESSAGE (OUTPATIENT)
Dept: HEMATOLOGY/ONCOLOGY | Facility: CLINIC | Age: 55
End: 2020-11-21

## 2020-11-24 ENCOUNTER — LAB VISIT (OUTPATIENT)
Dept: LAB | Facility: HOSPITAL | Age: 55
End: 2020-11-24
Attending: INTERNAL MEDICINE
Payer: COMMERCIAL

## 2020-11-24 DIAGNOSIS — C18.9 ADENOCARCINOMA OF COLON: ICD-10-CM

## 2020-11-24 PROCEDURE — 36415 COLL VENOUS BLD VENIPUNCTURE: CPT | Mod: PO

## 2020-11-24 PROCEDURE — 82378 CARCINOEMBRYONIC ANTIGEN: CPT

## 2020-11-25 LAB — CEA SERPL-MCNC: 1.3 NG/ML (ref 0–5)

## 2020-12-05 ENCOUNTER — PATIENT MESSAGE (OUTPATIENT)
Dept: INTERNAL MEDICINE | Facility: CLINIC | Age: 55
End: 2020-12-05

## 2020-12-07 RX ORDER — MELOXICAM 15 MG/1
15 TABLET ORAL DAILY PRN
Qty: 30 TABLET | Refills: 3 | Status: SHIPPED | OUTPATIENT
Start: 2020-12-07 | End: 2021-11-01

## 2020-12-17 ENCOUNTER — OFFICE VISIT (OUTPATIENT)
Dept: PODIATRY | Facility: CLINIC | Age: 55
End: 2020-12-17
Payer: COMMERCIAL

## 2020-12-17 ENCOUNTER — PATIENT OUTREACH (OUTPATIENT)
Dept: ADMINISTRATIVE | Facility: OTHER | Age: 55
End: 2020-12-17

## 2020-12-17 DIAGNOSIS — M72.2 PLANTAR FASCIAL FIBROMATOSIS OF RIGHT FOOT: Primary | ICD-10-CM

## 2020-12-17 DIAGNOSIS — M19.071 PRIMARY OSTEOARTHRITIS OF RIGHT FOOT: ICD-10-CM

## 2020-12-17 PROCEDURE — 99203 PR OFFICE/OUTPT VISIT, NEW, LEVL III, 30-44 MIN: ICD-10-PCS | Mod: S$GLB,,, | Performed by: PODIATRIST

## 2020-12-17 PROCEDURE — 99999 PR PBB SHADOW E&M-EST. PATIENT-LVL II: ICD-10-PCS | Mod: PBBFAC,,, | Performed by: PODIATRIST

## 2020-12-17 PROCEDURE — 1126F AMNT PAIN NOTED NONE PRSNT: CPT | Mod: S$GLB,,, | Performed by: PODIATRIST

## 2020-12-17 PROCEDURE — 99999 PR PBB SHADOW E&M-EST. PATIENT-LVL II: CPT | Mod: PBBFAC,,, | Performed by: PODIATRIST

## 2020-12-17 PROCEDURE — 99203 OFFICE O/P NEW LOW 30 MIN: CPT | Mod: S$GLB,,, | Performed by: PODIATRIST

## 2020-12-17 PROCEDURE — 1126F PR PAIN SEVERITY QUANTIFIED, NO PAIN PRESENT: ICD-10-PCS | Mod: S$GLB,,, | Performed by: PODIATRIST

## 2020-12-17 NOTE — PROGRESS NOTES
Subjective:       Patient ID: Jhoana Sierra is a 55 y.o. female.    Chief Complaint: Foot Pain (Knot noted to right plantar arch, denies pain at present but states she frequently experiences sharp shooting pain to the dorsal aspect of the right foot. She is non diabetic. )      HPI:  Jhoana Sierra presents to the office afternoon with concerned of a knot to the medial aspect of the right plantar arch and pain which is shooting to the dorsal aspect of the right foot.  States that the mass to the plantar foot has been growing over the last 6 months to its current size.  Denies any recent changes in shape or texture.  States she has been reading of plantar fibromatosis and would like to confirm the diagnosis.  She states that she occasionally has shooting pains to the dorsal aspect of the foot.  Denies any recent trauma or injury.  Currently rates her pain as a 0/10 at present. Patient's PMD is Dhruv Nguyen MD.   Review of patient's allergies indicates:  No Known Allergies    Past Medical History:   Diagnosis Date    PONV (postoperative nausea and vomiting)        Family History   Problem Relation Age of Onset    Fibromyalgia Sister     Diabetes Neg Hx     Heart disease Neg Hx        Social History     Socioeconomic History    Marital status:      Spouse name: Not on file    Number of children: 2    Years of education: Not on file    Highest education level: Not on file   Occupational History    Occupation: Not working currently   Social Needs    Financial resource strain: Not hard at all    Food insecurity     Worry: Never true     Inability: Never true    Transportation needs     Medical: No     Non-medical: No   Tobacco Use    Smoking status: Never Smoker    Smokeless tobacco: Never Used   Substance and Sexual Activity    Alcohol use: Yes     Frequency: Monthly or less     Drinks per session: 1 or 2     Binge frequency: Never     Comment: Occ use; HOLD 72HRS PRIOR TO SURGERY    Drug  use: No    Sexual activity: Yes     Partners: Male   Lifestyle    Physical activity     Days per week: Not on file     Minutes per session: Not on file    Stress: Not at all   Relationships    Social connections     Talks on phone: More than three times a week     Gets together: More than three times a week     Attends Confucianism service: Not on file     Active member of club or organization: No     Attends meetings of clubs or organizations: Not on file     Relationship status:    Other Topics Concern    Not on file   Social History Narrative    Not on file       Past Surgical History:   Procedure Laterality Date    COLONOSCOPY N/A 8/25/2020    Procedure: COLONOSCOPY;  Surgeon: Rianna Dillon MD;  Location: [facility];  Service: Endoscopy;  Laterality: N/A;    FLEXIBLE SIGMOIDOSCOPY N/A 10/8/2020    Procedure: SIGMOIDOSCOPY, FLEXIBLE;  Surgeon: Ziggy Hannah MD;  Location: [facility];  Service: General;  Laterality: N/A;    INJECTION OF ANESTHETIC AGENT INTO TISSUE PLANE DEFINED BY TRANSVERSUS ABDOMINIS MUSCLE N/A 10/8/2020    Procedure: BLOCK, TRANSVERSUS ABDOMINIS PLANE;  Surgeon: Ziggy Hannah MD;  Location: [facility];  Service: General;  Laterality: N/A;    LAPAROSCOPIC SIGMOIDECTOMY N/A 10/8/2020    Procedure: COLECTOMY, SIGMOID, LAPAROSCOPIC;  Surgeon: Ziggy Hannah MD;  Location: [facility];  Service: General;  Laterality: N/A;  lower anterior resection  Proctosigmoidectomy    LAPAROTOMY N/A 10/8/2020    Procedure: LAPAROTOMY;  Surgeon: Ziggy Hannah MD;  Location: [facility];  Service: General;  Laterality: N/A;    OVARIAN CYST REMOVAL      WISDOM TOOTH EXTRACTION         Review of Systems   Constitutional: Negative for activity change, appetite change, chills and fever.   HENT: Negative for sinus pain, sore throat and voice change.    Eyes: Negative for pain, redness and visual disturbance.   Respiratory: Negative for cough and shortness of breath.    Cardiovascular:  Negative for chest pain and palpitations.   Gastrointestinal: Negative for diarrhea, nausea and vomiting.   Musculoskeletal: Negative for back pain and joint swelling.   Skin: Negative for color change and wound.        Mass plantar medial longitudinal arch   Neurological: Negative for dizziness, weakness and numbness.   Psychiatric/Behavioral: The patient is not nervous/anxious.           Objective:   LMP 08/25/2017     CT Chest Abdomen Pelvis With Contrast  Narrative: EXAMINATION:  CT CHEST ABDOMEN PELVIS WITH CONTRAST (XPD)    CLINICAL HISTORY:  known rectosigmoid adenocarcinoma; Malignant neoplasm of colon, unspecified    TECHNIQUE:  Low dose axial images, sagittal and coronal reformations were obtained from the thoracic inlet to the pubic symphysis following the IV administration of 100 ML of Omnipaque 350 30 cc oral Omnipaque 350 was also consumed.    COMPARISON:  None    FINDINGS:  Bilateral calcified granulomas.  Miniscule nodule in the peripheral left upper lung (see coronal image 92 for example.  This measures 3 mm.  It is also seen on image 133 of series 5.  Heart size is normal.  No pericardial effusion.  Great vessels remain grossly patent.  Trachea and thyroid appear normal.  No pathologically enlarged lymph nodes.  Bones are intact.    No focal hepatic lesion. No gallstones. The spleen and the pancreas are normal. No biliary or pancreatic ductal dilation. Adrenal glands are within normal limits. There is no hydronephrosis. Aorta and IVC are within normal limits.  There are no pathologically enlarged lymph nodes. No ascites. Urinary bladder appears normal.  The uterus is present.  There is a right adnexal cystic lesion seen which measures 3.2 cm.  Further evaluation with ultrasound could be performed.    Stomach and small bowel are normal without evidence of bowel obstruction.  No evidence to suggest appendicitis.  There is masslike area in the rectosigmoid region in this patient with history of  rectosigmoid adenocarcinoma.  This area measures on the order of 3.8 cm maximally..  Impression: Masslike thickening in the rectus sigmoid region correlating to the known malignancy.  Miniscule nodule in the peripheral left upper lung, indeterminate but likely incidental., Right adnexal cyst for which further evaluation with ultrasound could be performed, and other findings as above.    Electronically signed by: Julio Cesar Negro MD  Date:    09/09/2020  Time:    13:52       Physical Exam  LOWER EXTREMITY PHYSICAL EXAMINATION    VASCULAR: The right DP pulse is 2/4 and the left DP is 2/4. The right PT pulse is 2/4 and the left PT pulse is 2/4. Proximal to distal, warm to warm. No dependent rubor or elevation palor is noted. Capillary refill time is less than 3 seconds. Hair growth is appreciated to the dorsal foot and digits.    NEUROLOGY: Proprioception is intact. Sensation to light touch is intact. Protective sensation is intact and vibratory sensation intact.    DERMATOLOGY: Skin is supple, dry and intact. No ecchymosis is noted. No hypertrophic skin formation. No erythema or cellulitis is noted.  There is a mass to the plantar surface of the right foot in the plantar medial longitudinal arch.  This mass does measure approximately 1 cm circumferentially and does elevate from the skin approximately 0.6 cm.  There is no evidence of erythema, open wounds, discoloration.  Mass is firmly attached to the underlying plantar fascia.  Activation of the windlass mechanism does increase the prominence of the mass.  Cause 0/10 pain presently    ORTHOPEDIC (RIGHT):  No pain on palpation of plantar mass on the right foot.  There is dorsal spurring noted to the 2nd tarsometatarsal joint the right foot.  This likely impinging on the dorsal nerve of the foot.  No pain with manual muscle strength testing.  Ambulating with a nonantalgic gait.  Pes cavus to neutral foot type is noted.      Assessment:     1. Plantar fascial fibromatosis  of right foot    2. Primary osteoarthritis of right foot        Plan:     Plantar fascial fibromatosis of right foot    Primary osteoarthritis of right foot     Patient seen evaluated.  Thorough discussion is had with the patient this afternoon, concerning the diagnosis, its etiology, and the treatment algorithm at present.  Plantar fibromatosis as noted the plantar surface of the right foot.  As she is not having pain and discomfort on palpation or with ambulation on the plantar foot, would recommend continuation with conservative options of doing nothing.  Did discuss other conservative options associated with padding, injections, and surgical intervention, however with patient's current pain level, would recommend either doing nothing or padding to the area.  If she does develop pain discomfort, would recommend injections prior to any surgical intervention.    In regards to the dorsal aspect of the foot and shooting pains, there is a dorsal spur present to the tarsometatarsal joint of the right foot at the 2nd TMT J. this appears to be impinging on the underlying dorsal cutaneous nerve causing pain and discomfort with in closed in shoe gear being compressed against the dorsal spur.  Recommend utilizing appropriate shoe gear with laces to prevent from excessive pressure being applied to dorsal aspect of the foot.    Patient follow-up p.r.n.        Future Appointments   Date Time Provider Department Center   1/15/2021 12:00 PM [facility]   1/25/2021  9:20 AM Ziggy Hannah MD [facility]   2/19/2021 10:00 AM [facility]

## 2021-01-13 ENCOUNTER — PATIENT MESSAGE (OUTPATIENT)
Dept: INTERNAL MEDICINE | Facility: CLINIC | Age: 56
End: 2021-01-13

## 2021-01-13 DIAGNOSIS — Z12.31 OTHER SCREENING MAMMOGRAM: ICD-10-CM

## 2021-01-15 ENCOUNTER — LAB VISIT (OUTPATIENT)
Dept: LAB | Facility: HOSPITAL | Age: 56
End: 2021-01-15
Attending: INTERNAL MEDICINE
Payer: COMMERCIAL

## 2021-01-15 DIAGNOSIS — C18.9 ADENOCARCINOMA OF COLON: ICD-10-CM

## 2021-01-15 DIAGNOSIS — D64.9 ANEMIA, UNSPECIFIED TYPE: ICD-10-CM

## 2021-01-15 LAB
ALBUMIN SERPL BCP-MCNC: 4.3 G/DL (ref 3.5–5.2)
ALP SERPL-CCNC: 79 U/L (ref 55–135)
ALT SERPL W/O P-5'-P-CCNC: 24 U/L (ref 10–44)
ANION GAP SERPL CALC-SCNC: 10 MMOL/L (ref 8–16)
AST SERPL-CCNC: 21 U/L (ref 10–40)
BILIRUB SERPL-MCNC: 0.4 MG/DL (ref 0.1–1)
BUN SERPL-MCNC: 16 MG/DL (ref 6–20)
CALCIUM SERPL-MCNC: 9.4 MG/DL (ref 8.7–10.5)
CHLORIDE SERPL-SCNC: 102 MMOL/L (ref 95–110)
CO2 SERPL-SCNC: 28 MMOL/L (ref 23–29)
CREAT SERPL-MCNC: 0.7 MG/DL (ref 0.5–1.4)
ERYTHROCYTE [DISTWIDTH] IN BLOOD BY AUTOMATED COUNT: 12.9 % (ref 11.5–14.5)
EST. GFR  (AFRICAN AMERICAN): >60 ML/MIN/1.73 M^2
EST. GFR  (NON AFRICAN AMERICAN): >60 ML/MIN/1.73 M^2
GLUCOSE SERPL-MCNC: 90 MG/DL (ref 70–110)
HCT VFR BLD AUTO: 38.5 % (ref 37–48.5)
HGB BLD-MCNC: 12.5 G/DL (ref 12–16)
IMM GRANULOCYTES # BLD AUTO: 0.01 K/UL (ref 0–0.04)
MCH RBC QN AUTO: 30.7 PG (ref 27–31)
MCHC RBC AUTO-ENTMCNC: 32.5 G/DL (ref 32–36)
MCV RBC AUTO: 95 FL (ref 82–98)
NEUTROPHILS # BLD AUTO: 2.3 K/UL (ref 1.8–7.7)
PLATELET # BLD AUTO: 226 K/UL (ref 150–350)
PMV BLD AUTO: 9.8 FL (ref 9.2–12.9)
POTASSIUM SERPL-SCNC: 4 MMOL/L (ref 3.5–5.1)
PROT SERPL-MCNC: 7.4 G/DL (ref 6–8.4)
RBC # BLD AUTO: 4.07 M/UL (ref 4–5.4)
SODIUM SERPL-SCNC: 140 MMOL/L (ref 136–145)
WBC # BLD AUTO: 4.03 K/UL (ref 3.9–12.7)

## 2021-01-15 PROCEDURE — 36415 COLL VENOUS BLD VENIPUNCTURE: CPT | Mod: PO

## 2021-01-15 PROCEDURE — 82378 CARCINOEMBRYONIC ANTIGEN: CPT

## 2021-01-15 PROCEDURE — 80053 COMPREHEN METABOLIC PANEL: CPT

## 2021-01-15 PROCEDURE — 85027 COMPLETE CBC AUTOMATED: CPT

## 2021-01-16 LAB — CEA SERPL-MCNC: 2.7 NG/ML (ref 0–5)

## 2021-01-21 ENCOUNTER — PATIENT OUTREACH (OUTPATIENT)
Dept: ADMINISTRATIVE | Facility: HOSPITAL | Age: 56
End: 2021-01-21

## 2021-01-25 ENCOUNTER — OFFICE VISIT (OUTPATIENT)
Dept: SURGERY | Facility: CLINIC | Age: 56
End: 2021-01-25
Payer: COMMERCIAL

## 2021-01-25 VITALS
DIASTOLIC BLOOD PRESSURE: 86 MMHG | HEIGHT: 64 IN | RESPIRATION RATE: 14 BRPM | WEIGHT: 159.38 LBS | SYSTOLIC BLOOD PRESSURE: 144 MMHG | BODY MASS INDEX: 27.21 KG/M2 | HEART RATE: 76 BPM

## 2021-01-25 DIAGNOSIS — C18.9 ADENOCARCINOMA OF COLON: Primary | ICD-10-CM

## 2021-01-25 PROCEDURE — 3008F BODY MASS INDEX DOCD: CPT | Mod: CPTII,S$GLB,, | Performed by: COLON & RECTAL SURGERY

## 2021-01-25 PROCEDURE — 99999 PR PBB SHADOW E&M-EST. PATIENT-LVL III: ICD-10-PCS | Mod: PBBFAC,,, | Performed by: COLON & RECTAL SURGERY

## 2021-01-25 PROCEDURE — 99999 PR PBB SHADOW E&M-EST. PATIENT-LVL III: CPT | Mod: PBBFAC,,, | Performed by: COLON & RECTAL SURGERY

## 2021-01-25 PROCEDURE — 99214 OFFICE O/P EST MOD 30 MIN: CPT | Mod: S$GLB,,, | Performed by: COLON & RECTAL SURGERY

## 2021-01-25 PROCEDURE — 1126F PR PAIN SEVERITY QUANTIFIED, NO PAIN PRESENT: ICD-10-PCS | Mod: S$GLB,,, | Performed by: COLON & RECTAL SURGERY

## 2021-01-25 PROCEDURE — 99214 PR OFFICE/OUTPT VISIT, EST, LEVL IV, 30-39 MIN: ICD-10-PCS | Mod: S$GLB,,, | Performed by: COLON & RECTAL SURGERY

## 2021-01-25 PROCEDURE — 1126F AMNT PAIN NOTED NONE PRSNT: CPT | Mod: S$GLB,,, | Performed by: COLON & RECTAL SURGERY

## 2021-01-25 PROCEDURE — 3008F PR BODY MASS INDEX (BMI) DOCUMENTED: ICD-10-PCS | Mod: CPTII,S$GLB,, | Performed by: COLON & RECTAL SURGERY

## 2021-02-25 ENCOUNTER — PATIENT OUTREACH (OUTPATIENT)
Dept: ADMINISTRATIVE | Facility: HOSPITAL | Age: 56
End: 2021-02-25

## 2021-03-11 ENCOUNTER — LAB VISIT (OUTPATIENT)
Dept: LAB | Facility: HOSPITAL | Age: 56
End: 2021-03-11
Attending: INTERNAL MEDICINE
Payer: COMMERCIAL

## 2021-03-11 DIAGNOSIS — C18.9 ADENOCARCINOMA OF COLON: ICD-10-CM

## 2021-03-11 LAB
ALBUMIN SERPL BCP-MCNC: 4.3 G/DL (ref 3.5–5.2)
ALP SERPL-CCNC: 78 U/L (ref 55–135)
ALT SERPL W/O P-5'-P-CCNC: 28 U/L (ref 10–44)
ANION GAP SERPL CALC-SCNC: 9 MMOL/L (ref 8–16)
AST SERPL-CCNC: 19 U/L (ref 10–40)
BASOPHILS # BLD AUTO: 0.03 K/UL (ref 0–0.2)
BASOPHILS NFR BLD: 0.7 % (ref 0–1.9)
BILIRUB SERPL-MCNC: 0.4 MG/DL (ref 0.1–1)
BUN SERPL-MCNC: 19 MG/DL (ref 6–20)
CALCIUM SERPL-MCNC: 9.6 MG/DL (ref 8.7–10.5)
CHLORIDE SERPL-SCNC: 103 MMOL/L (ref 95–110)
CO2 SERPL-SCNC: 29 MMOL/L (ref 23–29)
CREAT SERPL-MCNC: 0.8 MG/DL (ref 0.5–1.4)
DIFFERENTIAL METHOD: NORMAL
EOSINOPHIL # BLD AUTO: 0.1 K/UL (ref 0–0.5)
EOSINOPHIL NFR BLD: 1.2 % (ref 0–8)
ERYTHROCYTE [DISTWIDTH] IN BLOOD BY AUTOMATED COUNT: 12.7 % (ref 11.5–14.5)
EST. GFR  (AFRICAN AMERICAN): >60 ML/MIN/1.73 M^2
EST. GFR  (NON AFRICAN AMERICAN): >60 ML/MIN/1.73 M^2
GLUCOSE SERPL-MCNC: 57 MG/DL (ref 70–110)
HCT VFR BLD AUTO: 40.3 % (ref 37–48.5)
HGB BLD-MCNC: 12.9 G/DL (ref 12–16)
IMM GRANULOCYTES # BLD AUTO: 0.01 K/UL (ref 0–0.04)
IMM GRANULOCYTES NFR BLD AUTO: 0.2 % (ref 0–0.5)
LYMPHOCYTES # BLD AUTO: 1.5 K/UL (ref 1–4.8)
LYMPHOCYTES NFR BLD: 34.8 % (ref 18–48)
MCH RBC QN AUTO: 30.6 PG (ref 27–31)
MCHC RBC AUTO-ENTMCNC: 32 G/DL (ref 32–36)
MCV RBC AUTO: 96 FL (ref 82–98)
MONOCYTES # BLD AUTO: 0.3 K/UL (ref 0.3–1)
MONOCYTES NFR BLD: 7.9 % (ref 4–15)
NEUTROPHILS # BLD AUTO: 2.4 K/UL (ref 1.8–7.7)
NEUTROPHILS NFR BLD: 55.2 % (ref 38–73)
NRBC BLD-RTO: 0 /100 WBC
PLATELET # BLD AUTO: 261 K/UL (ref 150–350)
PMV BLD AUTO: 9.5 FL (ref 9.2–12.9)
POTASSIUM SERPL-SCNC: 4.2 MMOL/L (ref 3.5–5.1)
PROT SERPL-MCNC: 7.8 G/DL (ref 6–8.4)
RBC # BLD AUTO: 4.21 M/UL (ref 4–5.4)
SODIUM SERPL-SCNC: 141 MMOL/L (ref 136–145)
WBC # BLD AUTO: 4.31 K/UL (ref 3.9–12.7)

## 2021-03-11 PROCEDURE — 80053 COMPREHEN METABOLIC PANEL: CPT | Performed by: INTERNAL MEDICINE

## 2021-03-11 PROCEDURE — 36415 COLL VENOUS BLD VENIPUNCTURE: CPT | Mod: PO | Performed by: INTERNAL MEDICINE

## 2021-03-11 PROCEDURE — 85025 COMPLETE CBC W/AUTO DIFF WBC: CPT | Performed by: INTERNAL MEDICINE

## 2021-03-12 ENCOUNTER — LAB VISIT (OUTPATIENT)
Dept: LAB | Facility: HOSPITAL | Age: 56
End: 2021-03-12
Attending: INTERNAL MEDICINE
Payer: COMMERCIAL

## 2021-03-12 ENCOUNTER — OFFICE VISIT (OUTPATIENT)
Dept: HEMATOLOGY/ONCOLOGY | Facility: CLINIC | Age: 56
End: 2021-03-12
Payer: COMMERCIAL

## 2021-03-12 VITALS
SYSTOLIC BLOOD PRESSURE: 138 MMHG | DIASTOLIC BLOOD PRESSURE: 78 MMHG | TEMPERATURE: 98 F | HEIGHT: 64 IN | BODY MASS INDEX: 28.31 KG/M2 | WEIGHT: 165.81 LBS | HEART RATE: 88 BPM | OXYGEN SATURATION: 99 %

## 2021-03-12 DIAGNOSIS — C18.9 ADENOCARCINOMA OF COLON: ICD-10-CM

## 2021-03-12 DIAGNOSIS — D64.9 ANEMIA, UNSPECIFIED TYPE: Primary | ICD-10-CM

## 2021-03-12 PROCEDURE — 3008F PR BODY MASS INDEX (BMI) DOCUMENTED: ICD-10-PCS | Mod: CPTII,S$GLB,, | Performed by: INTERNAL MEDICINE

## 2021-03-12 PROCEDURE — 99215 PR OFFICE/OUTPT VISIT, EST, LEVL V, 40-54 MIN: ICD-10-PCS | Mod: S$GLB,,, | Performed by: INTERNAL MEDICINE

## 2021-03-12 PROCEDURE — 3008F BODY MASS INDEX DOCD: CPT | Mod: CPTII,S$GLB,, | Performed by: INTERNAL MEDICINE

## 2021-03-12 PROCEDURE — 82378 CARCINOEMBRYONIC ANTIGEN: CPT | Performed by: INTERNAL MEDICINE

## 2021-03-12 PROCEDURE — 1126F AMNT PAIN NOTED NONE PRSNT: CPT | Mod: S$GLB,,, | Performed by: INTERNAL MEDICINE

## 2021-03-12 PROCEDURE — 36415 COLL VENOUS BLD VENIPUNCTURE: CPT | Performed by: INTERNAL MEDICINE

## 2021-03-12 PROCEDURE — 99215 OFFICE O/P EST HI 40 MIN: CPT | Mod: S$GLB,,, | Performed by: INTERNAL MEDICINE

## 2021-03-12 PROCEDURE — 99999 PR PBB SHADOW E&M-EST. PATIENT-LVL III: ICD-10-PCS | Mod: PBBFAC,,, | Performed by: INTERNAL MEDICINE

## 2021-03-12 PROCEDURE — 99999 PR PBB SHADOW E&M-EST. PATIENT-LVL III: CPT | Mod: PBBFAC,,, | Performed by: INTERNAL MEDICINE

## 2021-03-12 PROCEDURE — 1126F PR PAIN SEVERITY QUANTIFIED, NO PAIN PRESENT: ICD-10-PCS | Mod: S$GLB,,, | Performed by: INTERNAL MEDICINE

## 2021-03-13 LAB — CEA SERPL-MCNC: 1.9 NG/ML (ref 0–5)

## 2021-04-12 ENCOUNTER — LAB VISIT (OUTPATIENT)
Dept: LAB | Facility: HOSPITAL | Age: 56
End: 2021-04-12
Attending: COLON & RECTAL SURGERY
Payer: COMMERCIAL

## 2021-04-12 DIAGNOSIS — C18.9 ADENOCARCINOMA OF COLON: ICD-10-CM

## 2021-04-12 LAB — CEA SERPL-MCNC: 1.6 NG/ML (ref 0–5)

## 2021-04-12 PROCEDURE — 36415 COLL VENOUS BLD VENIPUNCTURE: CPT | Mod: PO | Performed by: COLON & RECTAL SURGERY

## 2021-04-12 PROCEDURE — 82378 CARCINOEMBRYONIC ANTIGEN: CPT | Performed by: COLON & RECTAL SURGERY

## 2021-04-19 ENCOUNTER — OFFICE VISIT (OUTPATIENT)
Dept: SURGERY | Facility: CLINIC | Age: 56
End: 2021-04-19
Payer: COMMERCIAL

## 2021-04-19 VITALS
HEIGHT: 64 IN | TEMPERATURE: 99 F | DIASTOLIC BLOOD PRESSURE: 83 MMHG | WEIGHT: 164.25 LBS | SYSTOLIC BLOOD PRESSURE: 136 MMHG | BODY MASS INDEX: 28.04 KG/M2 | HEART RATE: 82 BPM

## 2021-04-19 DIAGNOSIS — C18.9 COLON ADENOCARCINOMA: ICD-10-CM

## 2021-04-19 DIAGNOSIS — C18.9 ADENOCARCINOMA OF COLON: Primary | ICD-10-CM

## 2021-04-19 PROCEDURE — 99999 PR PBB SHADOW E&M-EST. PATIENT-LVL III: ICD-10-PCS | Mod: PBBFAC,,, | Performed by: COLON & RECTAL SURGERY

## 2021-04-19 PROCEDURE — 3008F PR BODY MASS INDEX (BMI) DOCUMENTED: ICD-10-PCS | Mod: CPTII,S$GLB,, | Performed by: COLON & RECTAL SURGERY

## 2021-04-19 PROCEDURE — 3008F BODY MASS INDEX DOCD: CPT | Mod: CPTII,S$GLB,, | Performed by: COLON & RECTAL SURGERY

## 2021-04-19 PROCEDURE — 99214 OFFICE O/P EST MOD 30 MIN: CPT | Mod: S$GLB,,, | Performed by: COLON & RECTAL SURGERY

## 2021-04-19 PROCEDURE — 1126F AMNT PAIN NOTED NONE PRSNT: CPT | Mod: S$GLB,,, | Performed by: COLON & RECTAL SURGERY

## 2021-04-19 PROCEDURE — 99214 PR OFFICE/OUTPT VISIT, EST, LEVL IV, 30-39 MIN: ICD-10-PCS | Mod: S$GLB,,, | Performed by: COLON & RECTAL SURGERY

## 2021-04-19 PROCEDURE — 1126F PR PAIN SEVERITY QUANTIFIED, NO PAIN PRESENT: ICD-10-PCS | Mod: S$GLB,,, | Performed by: COLON & RECTAL SURGERY

## 2021-04-19 PROCEDURE — 99999 PR PBB SHADOW E&M-EST. PATIENT-LVL III: CPT | Mod: PBBFAC,,, | Performed by: COLON & RECTAL SURGERY

## 2021-04-28 ENCOUNTER — PATIENT MESSAGE (OUTPATIENT)
Dept: SURGERY | Facility: CLINIC | Age: 56
End: 2021-04-28

## 2021-05-05 RX ORDER — TIZANIDINE 4 MG/1
4 TABLET ORAL 3 TIMES DAILY PRN
Qty: 30 TABLET | Refills: 0 | Status: SHIPPED | OUTPATIENT
Start: 2021-05-05 | End: 2022-06-13

## 2021-06-11 ENCOUNTER — OFFICE VISIT (OUTPATIENT)
Dept: INTERNAL MEDICINE | Facility: CLINIC | Age: 56
End: 2021-06-11
Payer: COMMERCIAL

## 2021-06-11 VITALS
TEMPERATURE: 99 F | OXYGEN SATURATION: 96 % | SYSTOLIC BLOOD PRESSURE: 130 MMHG | DIASTOLIC BLOOD PRESSURE: 60 MMHG | HEART RATE: 69 BPM | WEIGHT: 162.06 LBS | BODY MASS INDEX: 27.81 KG/M2

## 2021-06-11 DIAGNOSIS — Z00.00 ANNUAL PHYSICAL EXAM: Primary | ICD-10-CM

## 2021-06-11 PROCEDURE — 99999 PR PBB SHADOW E&M-EST. PATIENT-LVL III: ICD-10-PCS | Mod: PBBFAC,,, | Performed by: FAMILY MEDICINE

## 2021-06-11 PROCEDURE — 99396 PR PREVENTIVE VISIT,EST,40-64: ICD-10-PCS | Mod: S$GLB,,, | Performed by: FAMILY MEDICINE

## 2021-06-11 PROCEDURE — 1126F AMNT PAIN NOTED NONE PRSNT: CPT | Mod: S$GLB,,, | Performed by: FAMILY MEDICINE

## 2021-06-11 PROCEDURE — 3008F PR BODY MASS INDEX (BMI) DOCUMENTED: ICD-10-PCS | Mod: CPTII,S$GLB,, | Performed by: FAMILY MEDICINE

## 2021-06-11 PROCEDURE — 3008F BODY MASS INDEX DOCD: CPT | Mod: CPTII,S$GLB,, | Performed by: FAMILY MEDICINE

## 2021-06-11 PROCEDURE — 99999 PR PBB SHADOW E&M-EST. PATIENT-LVL III: CPT | Mod: PBBFAC,,, | Performed by: FAMILY MEDICINE

## 2021-06-11 PROCEDURE — 99396 PREV VISIT EST AGE 40-64: CPT | Mod: S$GLB,,, | Performed by: FAMILY MEDICINE

## 2021-06-11 PROCEDURE — 1126F PR PAIN SEVERITY QUANTIFIED, NO PAIN PRESENT: ICD-10-PCS | Mod: S$GLB,,, | Performed by: FAMILY MEDICINE

## 2021-06-17 ENCOUNTER — LAB VISIT (OUTPATIENT)
Dept: LAB | Facility: HOSPITAL | Age: 56
End: 2021-06-17
Attending: FAMILY MEDICINE
Payer: COMMERCIAL

## 2021-06-17 DIAGNOSIS — Z00.00 ANNUAL PHYSICAL EXAM: ICD-10-CM

## 2021-06-17 DIAGNOSIS — D64.9 ANEMIA, UNSPECIFIED TYPE: ICD-10-CM

## 2021-06-17 DIAGNOSIS — C18.9 ADENOCARCINOMA OF COLON: ICD-10-CM

## 2021-06-17 LAB
25(OH)D3+25(OH)D2 SERPL-MCNC: 30 NG/ML (ref 30–96)
ALBUMIN SERPL BCP-MCNC: 4.3 G/DL (ref 3.5–5.2)
ALP SERPL-CCNC: 81 U/L (ref 55–135)
ALT SERPL W/O P-5'-P-CCNC: 19 U/L (ref 10–44)
ANION GAP SERPL CALC-SCNC: 12 MMOL/L (ref 8–16)
AST SERPL-CCNC: 18 U/L (ref 10–40)
BASOPHILS # BLD AUTO: 0.03 K/UL (ref 0–0.2)
BASOPHILS NFR BLD: 0.8 % (ref 0–1.9)
BILIRUB SERPL-MCNC: 0.4 MG/DL (ref 0.1–1)
BUN SERPL-MCNC: 17 MG/DL (ref 6–20)
CALCIUM SERPL-MCNC: 9.8 MG/DL (ref 8.7–10.5)
CEA SERPL-MCNC: 2 NG/ML (ref 0–5)
CHLORIDE SERPL-SCNC: 104 MMOL/L (ref 95–110)
CHOLEST SERPL-MCNC: 224 MG/DL (ref 120–199)
CHOLEST/HDLC SERPL: 3.7 {RATIO} (ref 2–5)
CO2 SERPL-SCNC: 25 MMOL/L (ref 23–29)
CREAT SERPL-MCNC: 0.8 MG/DL (ref 0.5–1.4)
DIFFERENTIAL METHOD: ABNORMAL
EOSINOPHIL # BLD AUTO: 0.1 K/UL (ref 0–0.5)
EOSINOPHIL NFR BLD: 1.4 % (ref 0–8)
ERYTHROCYTE [DISTWIDTH] IN BLOOD BY AUTOMATED COUNT: 12.5 % (ref 11.5–14.5)
EST. GFR  (AFRICAN AMERICAN): >60 ML/MIN/1.73 M^2
EST. GFR  (NON AFRICAN AMERICAN): >60 ML/MIN/1.73 M^2
GLUCOSE SERPL-MCNC: 91 MG/DL (ref 70–110)
HCT VFR BLD AUTO: 40.6 % (ref 37–48.5)
HDLC SERPL-MCNC: 61 MG/DL (ref 40–75)
HDLC SERPL: 27.2 % (ref 20–50)
HGB BLD-MCNC: 13.1 G/DL (ref 12–16)
IMM GRANULOCYTES # BLD AUTO: 0.01 K/UL (ref 0–0.04)
IMM GRANULOCYTES NFR BLD AUTO: 0.3 % (ref 0–0.5)
LDLC SERPL CALC-MCNC: 136.6 MG/DL (ref 63–159)
LYMPHOCYTES # BLD AUTO: 1.3 K/UL (ref 1–4.8)
LYMPHOCYTES NFR BLD: 36.3 % (ref 18–48)
MCH RBC QN AUTO: 30.2 PG (ref 27–31)
MCHC RBC AUTO-ENTMCNC: 32.3 G/DL (ref 32–36)
MCV RBC AUTO: 94 FL (ref 82–98)
MONOCYTES # BLD AUTO: 0.3 K/UL (ref 0.3–1)
MONOCYTES NFR BLD: 6.8 % (ref 4–15)
NEUTROPHILS # BLD AUTO: 2 K/UL (ref 1.8–7.7)
NEUTROPHILS NFR BLD: 54.4 % (ref 38–73)
NONHDLC SERPL-MCNC: 163 MG/DL
NRBC BLD-RTO: 0 /100 WBC
PLATELET # BLD AUTO: 234 K/UL (ref 150–450)
PMV BLD AUTO: 9.9 FL (ref 9.2–12.9)
POTASSIUM SERPL-SCNC: 4.1 MMOL/L (ref 3.5–5.1)
PROT SERPL-MCNC: 7.7 G/DL (ref 6–8.4)
RBC # BLD AUTO: 4.34 M/UL (ref 4–5.4)
SODIUM SERPL-SCNC: 141 MMOL/L (ref 136–145)
TRIGL SERPL-MCNC: 132 MG/DL (ref 30–150)
TSH SERPL DL<=0.005 MIU/L-ACNC: 2.68 UIU/ML (ref 0.4–4)
WBC # BLD AUTO: 3.66 K/UL (ref 3.9–12.7)

## 2021-06-17 PROCEDURE — 80061 LIPID PANEL: CPT | Performed by: FAMILY MEDICINE

## 2021-06-17 PROCEDURE — 80053 COMPREHEN METABOLIC PANEL: CPT | Performed by: INTERNAL MEDICINE

## 2021-06-17 PROCEDURE — 84443 ASSAY THYROID STIM HORMONE: CPT | Performed by: FAMILY MEDICINE

## 2021-06-17 PROCEDURE — 83036 HEMOGLOBIN GLYCOSYLATED A1C: CPT | Performed by: FAMILY MEDICINE

## 2021-06-17 PROCEDURE — 36415 COLL VENOUS BLD VENIPUNCTURE: CPT | Mod: PO | Performed by: FAMILY MEDICINE

## 2021-06-17 PROCEDURE — 82378 CARCINOEMBRYONIC ANTIGEN: CPT | Performed by: INTERNAL MEDICINE

## 2021-06-17 PROCEDURE — 85025 COMPLETE CBC W/AUTO DIFF WBC: CPT | Performed by: INTERNAL MEDICINE

## 2021-06-17 PROCEDURE — 82306 VITAMIN D 25 HYDROXY: CPT | Performed by: FAMILY MEDICINE

## 2021-06-17 PROCEDURE — 86703 HIV-1/HIV-2 1 RESULT ANTBDY: CPT | Performed by: FAMILY MEDICINE

## 2021-06-18 ENCOUNTER — OFFICE VISIT (OUTPATIENT)
Dept: HEMATOLOGY/ONCOLOGY | Facility: CLINIC | Age: 56
End: 2021-06-18
Payer: COMMERCIAL

## 2021-06-18 VITALS
WEIGHT: 161.81 LBS | DIASTOLIC BLOOD PRESSURE: 79 MMHG | BODY MASS INDEX: 27.63 KG/M2 | OXYGEN SATURATION: 98 % | TEMPERATURE: 98 F | SYSTOLIC BLOOD PRESSURE: 146 MMHG | HEIGHT: 64 IN | HEART RATE: 70 BPM

## 2021-06-18 DIAGNOSIS — C18.9 ADENOCARCINOMA OF COLON: ICD-10-CM

## 2021-06-18 LAB
ESTIMATED AVG GLUCOSE: 111 MG/DL (ref 68–131)
HBA1C MFR BLD: 5.5 % (ref 4–5.6)
HIV 1+2 AB+HIV1 P24 AG SERPL QL IA: NEGATIVE

## 2021-06-18 PROCEDURE — 3008F BODY MASS INDEX DOCD: CPT | Mod: CPTII,S$GLB,, | Performed by: INTERNAL MEDICINE

## 2021-06-18 PROCEDURE — 1126F PR PAIN SEVERITY QUANTIFIED, NO PAIN PRESENT: ICD-10-PCS | Mod: S$GLB,,, | Performed by: INTERNAL MEDICINE

## 2021-06-18 PROCEDURE — 99215 PR OFFICE/OUTPT VISIT, EST, LEVL V, 40-54 MIN: ICD-10-PCS | Mod: S$GLB,,, | Performed by: INTERNAL MEDICINE

## 2021-06-18 PROCEDURE — 99215 OFFICE O/P EST HI 40 MIN: CPT | Mod: S$GLB,,, | Performed by: INTERNAL MEDICINE

## 2021-06-18 PROCEDURE — 3008F PR BODY MASS INDEX (BMI) DOCUMENTED: ICD-10-PCS | Mod: CPTII,S$GLB,, | Performed by: INTERNAL MEDICINE

## 2021-06-18 PROCEDURE — 1126F AMNT PAIN NOTED NONE PRSNT: CPT | Mod: S$GLB,,, | Performed by: INTERNAL MEDICINE

## 2021-06-18 PROCEDURE — 99999 PR PBB SHADOW E&M-EST. PATIENT-LVL III: CPT | Mod: PBBFAC,,, | Performed by: INTERNAL MEDICINE

## 2021-06-18 PROCEDURE — 99999 PR PBB SHADOW E&M-EST. PATIENT-LVL III: ICD-10-PCS | Mod: PBBFAC,,, | Performed by: INTERNAL MEDICINE

## 2021-06-29 ENCOUNTER — PATIENT MESSAGE (OUTPATIENT)
Dept: INTERNAL MEDICINE | Facility: CLINIC | Age: 56
End: 2021-06-29

## 2021-07-06 ENCOUNTER — PATIENT OUTREACH (OUTPATIENT)
Dept: ADMINISTRATIVE | Facility: HOSPITAL | Age: 56
End: 2021-07-06

## 2021-10-11 ENCOUNTER — LAB VISIT (OUTPATIENT)
Dept: LAB | Facility: HOSPITAL | Age: 56
End: 2021-10-11
Attending: COLON & RECTAL SURGERY
Payer: COMMERCIAL

## 2021-10-11 DIAGNOSIS — C18.9 ADENOCARCINOMA OF COLON: ICD-10-CM

## 2021-10-11 LAB — CEA SERPL-MCNC: 2.1 NG/ML (ref 0–5)

## 2021-10-11 PROCEDURE — 82378 CARCINOEMBRYONIC ANTIGEN: CPT | Performed by: COLON & RECTAL SURGERY

## 2021-10-11 PROCEDURE — 36415 COLL VENOUS BLD VENIPUNCTURE: CPT | Mod: PO | Performed by: COLON & RECTAL SURGERY

## 2021-10-18 ENCOUNTER — OFFICE VISIT (OUTPATIENT)
Dept: SURGERY | Facility: CLINIC | Age: 56
End: 2021-10-18
Payer: COMMERCIAL

## 2021-10-18 VITALS
SYSTOLIC BLOOD PRESSURE: 138 MMHG | DIASTOLIC BLOOD PRESSURE: 77 MMHG | TEMPERATURE: 97 F | HEART RATE: 69 BPM | BODY MASS INDEX: 28.65 KG/M2 | WEIGHT: 166.88 LBS

## 2021-10-18 DIAGNOSIS — C18.9 COLON ADENOCARCINOMA: ICD-10-CM

## 2021-10-18 DIAGNOSIS — Z85.038 PERSONAL HISTORY OF COLON CANCER: Primary | ICD-10-CM

## 2021-10-18 DIAGNOSIS — C18.9 ADENOCARCINOMA OF COLON: ICD-10-CM

## 2021-10-18 PROCEDURE — 3078F PR MOST RECENT DIASTOLIC BLOOD PRESSURE < 80 MM HG: ICD-10-PCS | Mod: CPTII,S$GLB,, | Performed by: COLON & RECTAL SURGERY

## 2021-10-18 PROCEDURE — 3044F HG A1C LEVEL LT 7.0%: CPT | Mod: CPTII,S$GLB,, | Performed by: COLON & RECTAL SURGERY

## 2021-10-18 PROCEDURE — 3075F SYST BP GE 130 - 139MM HG: CPT | Mod: CPTII,S$GLB,, | Performed by: COLON & RECTAL SURGERY

## 2021-10-18 PROCEDURE — 99214 OFFICE O/P EST MOD 30 MIN: CPT | Mod: S$GLB,,, | Performed by: COLON & RECTAL SURGERY

## 2021-10-18 PROCEDURE — 99214 PR OFFICE/OUTPT VISIT, EST, LEVL IV, 30-39 MIN: ICD-10-PCS | Mod: S$GLB,,, | Performed by: COLON & RECTAL SURGERY

## 2021-10-18 PROCEDURE — 1160F PR REVIEW ALL MEDS BY PRESCRIBER/CLIN PHARMACIST DOCUMENTED: ICD-10-PCS | Mod: CPTII,S$GLB,, | Performed by: COLON & RECTAL SURGERY

## 2021-10-18 PROCEDURE — 1160F RVW MEDS BY RX/DR IN RCRD: CPT | Mod: CPTII,S$GLB,, | Performed by: COLON & RECTAL SURGERY

## 2021-10-18 PROCEDURE — 1159F MED LIST DOCD IN RCRD: CPT | Mod: CPTII,S$GLB,, | Performed by: COLON & RECTAL SURGERY

## 2021-10-18 PROCEDURE — 3044F PR MOST RECENT HEMOGLOBIN A1C LEVEL <7.0%: ICD-10-PCS | Mod: CPTII,S$GLB,, | Performed by: COLON & RECTAL SURGERY

## 2021-10-18 PROCEDURE — 3075F PR MOST RECENT SYSTOLIC BLOOD PRESS GE 130-139MM HG: ICD-10-PCS | Mod: CPTII,S$GLB,, | Performed by: COLON & RECTAL SURGERY

## 2021-10-18 PROCEDURE — 99999 PR PBB SHADOW E&M-EST. PATIENT-LVL III: ICD-10-PCS | Mod: PBBFAC,,, | Performed by: COLON & RECTAL SURGERY

## 2021-10-18 PROCEDURE — 99999 PR PBB SHADOW E&M-EST. PATIENT-LVL III: CPT | Mod: PBBFAC,,, | Performed by: COLON & RECTAL SURGERY

## 2021-10-18 PROCEDURE — 1159F PR MEDICATION LIST DOCUMENTED IN MEDICAL RECORD: ICD-10-PCS | Mod: CPTII,S$GLB,, | Performed by: COLON & RECTAL SURGERY

## 2021-10-18 PROCEDURE — 3078F DIAST BP <80 MM HG: CPT | Mod: CPTII,S$GLB,, | Performed by: COLON & RECTAL SURGERY

## 2021-10-18 PROCEDURE — 3008F PR BODY MASS INDEX (BMI) DOCUMENTED: ICD-10-PCS | Mod: CPTII,S$GLB,, | Performed by: COLON & RECTAL SURGERY

## 2021-10-18 PROCEDURE — 3008F BODY MASS INDEX DOCD: CPT | Mod: CPTII,S$GLB,, | Performed by: COLON & RECTAL SURGERY

## 2021-10-18 RX ORDER — VIT C/E/ZN/COPPR/LUTEIN/ZEAXAN 250MG-90MG
1000 CAPSULE ORAL DAILY
COMMUNITY
End: 2022-03-02

## 2021-10-18 RX ORDER — ASCORBIC ACID 500 MG
500 TABLET ORAL DAILY
COMMUNITY
End: 2022-03-02

## 2021-10-18 RX ORDER — SODIUM, POTASSIUM,MAG SULFATES 17.5-3.13G
1 SOLUTION, RECONSTITUTED, ORAL ORAL DAILY
Qty: 1 KIT | Refills: 0 | Status: SHIPPED | OUTPATIENT
Start: 2021-10-18 | End: 2021-10-20

## 2021-10-19 ENCOUNTER — PATIENT MESSAGE (OUTPATIENT)
Dept: ENDOSCOPY | Facility: HOSPITAL | Age: 56
End: 2021-10-19
Payer: COMMERCIAL

## 2021-12-10 ENCOUNTER — PATIENT MESSAGE (OUTPATIENT)
Dept: ENDOSCOPY | Facility: HOSPITAL | Age: 56
End: 2021-12-10
Payer: COMMERCIAL

## 2022-01-04 ENCOUNTER — PATIENT MESSAGE (OUTPATIENT)
Dept: ENDOSCOPY | Facility: HOSPITAL | Age: 57
End: 2022-01-04
Payer: COMMERCIAL

## 2022-01-07 ENCOUNTER — ANESTHESIA EVENT (OUTPATIENT)
Dept: ENDOSCOPY | Facility: HOSPITAL | Age: 57
End: 2022-01-07
Payer: COMMERCIAL

## 2022-01-07 ENCOUNTER — HOSPITAL ENCOUNTER (OUTPATIENT)
Facility: HOSPITAL | Age: 57
Discharge: HOME | End: 2022-01-07
Attending: COLON & RECTAL SURGERY | Admitting: COLON & RECTAL SURGERY
Payer: COMMERCIAL

## 2022-01-07 ENCOUNTER — PATIENT MESSAGE (OUTPATIENT)
Dept: ENDOSCOPY | Facility: HOSPITAL | Age: 57
End: 2022-01-07
Payer: COMMERCIAL

## 2022-01-07 ENCOUNTER — ANESTHESIA (OUTPATIENT)
Dept: ENDOSCOPY | Facility: HOSPITAL | Age: 57
End: 2022-01-07
Payer: COMMERCIAL

## 2022-01-07 DIAGNOSIS — Z12.11 SCREENING FOR COLON CANCER: ICD-10-CM

## 2022-01-07 DIAGNOSIS — C18.9 COLON ADENOCARCINOMA: Primary | ICD-10-CM

## 2022-01-07 LAB
CTP QC/QA: YES
SARS-COV-2 AG RESP QL IA.RAPID: NEGATIVE

## 2022-01-07 PROCEDURE — 45380 COLONOSCOPY AND BIOPSY: CPT | Performed by: COLON & RECTAL SURGERY

## 2022-01-07 PROCEDURE — 45380 PR COLONOSCOPY,BIOPSY: ICD-10-PCS | Mod: 33,,, | Performed by: COLON & RECTAL SURGERY

## 2022-01-07 PROCEDURE — 63600175 PHARM REV CODE 636 W HCPCS: Performed by: NURSE ANESTHETIST, CERTIFIED REGISTERED

## 2022-01-07 PROCEDURE — 27201012 HC FORCEPS, HOT/COLD, DISP: Performed by: COLON & RECTAL SURGERY

## 2022-01-07 PROCEDURE — 88305 TISSUE EXAM BY PATHOLOGIST: CPT | Mod: 26,,, | Performed by: PATHOLOGY

## 2022-01-07 PROCEDURE — 45380 COLONOSCOPY AND BIOPSY: CPT | Mod: 33,,, | Performed by: COLON & RECTAL SURGERY

## 2022-01-07 PROCEDURE — 88305 TISSUE EXAM BY PATHOLOGIST: CPT | Performed by: PATHOLOGY

## 2022-01-07 PROCEDURE — 37000009 HC ANESTHESIA EA ADD 15 MINS: Performed by: COLON & RECTAL SURGERY

## 2022-01-07 PROCEDURE — 88305 TISSUE EXAM BY PATHOLOGIST: ICD-10-PCS | Mod: 26,,, | Performed by: PATHOLOGY

## 2022-01-07 PROCEDURE — 25000003 PHARM REV CODE 250: Performed by: NURSE ANESTHETIST, CERTIFIED REGISTERED

## 2022-01-07 PROCEDURE — 37000008 HC ANESTHESIA 1ST 15 MINUTES: Performed by: COLON & RECTAL SURGERY

## 2022-01-07 RX ORDER — PROPOFOL 10 MG/ML
VIAL (ML) INTRAVENOUS
Status: DISCONTINUED | OUTPATIENT
Start: 2022-01-07 | End: 2022-01-07

## 2022-01-07 RX ORDER — SODIUM CHLORIDE, SODIUM LACTATE, POTASSIUM CHLORIDE, CALCIUM CHLORIDE 600; 310; 30; 20 MG/100ML; MG/100ML; MG/100ML; MG/100ML
INJECTION, SOLUTION INTRAVENOUS CONTINUOUS PRN
Status: DISCONTINUED | OUTPATIENT
Start: 2022-01-07 | End: 2022-01-07

## 2022-01-07 RX ORDER — LIDOCAINE HCL/PF 100 MG/5ML
SYRINGE (ML) INTRAVENOUS
Status: DISCONTINUED | OUTPATIENT
Start: 2022-01-07 | End: 2022-01-07

## 2022-01-07 RX ADMIN — LIDOCAINE HYDROCHLORIDE 50 MG: 20 INJECTION, SOLUTION INTRAVENOUS at 08:01

## 2022-01-07 RX ADMIN — PROPOFOL 50 MG: 10 INJECTION, EMULSION INTRAVENOUS at 08:01

## 2022-01-07 RX ADMIN — PROPOFOL 150 MG: 10 INJECTION, EMULSION INTRAVENOUS at 08:01

## 2022-01-07 RX ADMIN — SODIUM CHLORIDE, SODIUM LACTATE, POTASSIUM CHLORIDE, AND CALCIUM CHLORIDE: .6; .31; .03; .02 INJECTION, SOLUTION INTRAVENOUS at 08:01

## 2022-01-07 NOTE — BRIEF OP NOTE
[facility] Endoscopy (Hospital)  Brief Operative Note     SUMMARY     Surgery Date: 1/7/2022     Surgeon(s) and Role:     * Ziggy Hannah MD - Primary    Assisting Surgeon: None    Pre-op Diagnosis:  Adenocarcinoma of colon [C18.9]  Personal history of colon cancer [Z85.038]    Post-op Diagnosis:  Post-Op Diagnosis Codes:     * Adenocarcinoma of colon [C18.9]     * Personal history of colon cancer [Z85.038]    Procedure(s) (LRB):  COLONOSCOPY (N/A)    Anesthesia: Choice    Description of the findings of the procedure: One small polyp, healthy colorectal anastomosis, hemorrhoids    Estimated Blood Loss: * No values recorded between 1/7/2022 12:00 AM and 1/7/2022  8:49 AM *         Specimens:   Specimen (24h ago, onward)             Start     Ordered    01/07/22 0842  Specimen to Pathology, Surgery Gastrointestinal tract  Once        Comments: Pre-op Diagnosis: Adenocarcinoma of colon [C18.9]  Personal history of colon cancer [Z85.038]    Procedure(s):  COLONOSCOPY       1. Hepatic flexure polypectomy   References:    Click here for ordering Quick Tip   Question Answer Comment   Procedure Type: Gastrointestinal tract    Release to patient Immediate        01/07/22 0846                Discharge Note    SUMMARY     Admit Date: 1/7/2022    Discharge Date and Time: 1/7/2022 8:49 AM    Hospital Course Patient was seen in the preoperative area by both myself and anesthesia. All consents were verified and all questions appropriately answered. All risks, benefits and alternatives explained to patient. Patient proceeded to endoscopy suite for colonoscopy and was discharged home postoperative once cleared by anesthesia.    Final Diagnosis: Post-Op Diagnosis Codes:     * Adenocarcinoma of colon [C18.9]     * Personal history of colon cancer [Z85.038]    Disposition: Home or Self Care    Follow Up/Patient Instructions: See Provation report    Medications:  Reconciled Home Medications:      Medication List      CONTINUE taking  these medications    ascorbic acid (vitamin C) 500 MG tablet  Commonly known as: VITAMIN C  Take 500 mg by mouth once daily.     cholecalciferol (vitamin D3) 25 mcg (1,000 unit) capsule  Commonly known as: VITAMIN D3  Take 1,000 Units by mouth once daily.     meloxicam 15 MG tablet  Commonly known as: MOBIC  TAKE 1 TABLET BY MOUTH ONCE DAILY AS NEEDED FOR PAIN     multivitamin with minerals tablet  Take 1 tablet by mouth once daily.     pseudoephedrine 30 MG tablet  Commonly known as: SUDAFED  Take 30 mg by mouth every 4 (four) hours as needed for Congestion.     tiZANidine 4 MG tablet  Commonly known as: ZANAFLEX  Take 1 tablet (4 mg total) by mouth 3 (three) times daily as needed (muscle spasm).          Discharge Procedure Orders   Diet general     Call MD for:  temperature >100.4     Call MD for:  persistent nausea and vomiting     Call MD for:  severe uncontrolled pain     Call MD for:  difficulty breathing, headache or visual disturbances     Call MD for:  redness, tenderness, or signs of infection (pain, swelling, redness, odor or green/yellow discharge around incision site)     Call MD for:  hives     Call MD for:  persistent dizziness or light-headedness     Call MD for:  extreme fatigue     Activity as tolerated      Follow-up Information     Dhruv Nguyen MD.    Specialty: Family Medicine  Why: As needed  Contact information:  [address] [place] [place] [address] [phone]

## 2022-01-07 NOTE — [employer]ATION PATIENT INSTRUCTIONS
Discharge Summary/Instructions after an Endoscopic Procedure  Patient Name: Jhoana Fuentes  Patient MRN: [id number]  Patient YOB: 1965  Friday, January 7, 2022 Ziggy Hannah MD  Dear patient,  As a result of recent federal legislation (The Federal Cures Act), you may   receive lab or pathology results from your procedure in your [facility]   account before your physician is able to contact you. Your physician or   their representative will relay the results to you with their   recommendations at their soonest availability.  Thank you,  RESTRICTIONS:  During your procedure today, you received medications for sedation.  These   medications may affect your judgment, balance and coordination.  Therefore,   for 24 hours, you have the following restrictions:   - DO NOT drive a car, operate machinery, make legal/financial decisions,   sign important papers or drink alcohol.    ACTIVITY:  Today: no heavy lifting, straining or running due to procedural   sedation/anesthesia.  The following day: return to full activity including work.  DIET:  Eat and drink normally unless instructed otherwise.     TREATMENT FOR COMMON SIDE EFFECTS:  - Mild abdominal pain, nausea, belching, bloating or excessive gas:  rest,   eat lightly and use a heating pad.  - Sore Throat: treat with throat lozenges and/or gargle with warm salt   water.  - Because air was used during the procedure, expelling large amounts of air   from your rectum or belching is normal.  - If a bowel prep was taken, you may not have a bowel movement for 1-3 days.    This is normal.  SYMPTOMS TO WATCH FOR AND REPORT TO YOUR PHYSICIAN:  1. Abdominal pain or bloating, other than gas cramps.  2. Chest pain.  3. Back pain.  4. Signs of infection such as: chills or fever occurring within 24 hours   after the procedure.  5. Rectal bleeding, which would show as bright red, maroon, or black stools.   (A tablespoon of blood from the rectum is not serious, especially if    hemorrhoids are present.)  6. Vomiting.  7. Weakness or dizziness.  GO DIRECTLY TO THE NEAREST EMERGENCY ROOM IF YOU HAVE ANY OF THE FOLLOWING:      Difficulty breathing              Chills and/or fever over 101 F   Persistent vomiting and/or vomiting blood   Severe abdominal pain   Severe chest pain   Black, tarry stools   Bleeding- more than one tablespoon   Any other symptom or condition that you feel may need urgent attention  Your doctor recommends these additional instructions:  If any biopsies were taken, your doctors clinic will contact you in 1 to 2   weeks with any results.  - Discharge patient to home.   - High fiber diet.   - Continue present medications.   - Await pathology results.   - Repeat colonoscopy in 3 years for surveillance.   - Return to primary care physician PRN.  For questions, problems or results please call your physician Ziggy Hannah MD at Work:  [phone]  If you have any questions about the above instructions, call the GI   department at [phone] or call the endoscopy unit at [phone]   from 7am until 3 pm.  [facility], EMERGENCY ROOM PHONE NUMBER:   [phone]  IF A COMPLICATION OR EMERGENCY SITUATION ARISES AND YOU ARE UNABLE TO REACH   YOUR PHYSICIAN - GO DIRECTLY TO THE EMERGENCY ROOM.  I have read or have had read to me these discharge instructions for my   procedure and have received a written copy.  I understand these   instructions and will follow-up with my physician if I have any questions.     __________________________________       _____________________________________  Nurse Signature                                          Patient/Designated   Responsible Party Signature  MD Ziggy Truong MD  1/7/2022 8:49:14 AM  This report has been verified and signed electronically.  Dear patient,  As a result of recent federal legislation (The Federal Cures Act), you may   receive lab or pathology results  from your procedure in your [facility]   account before your physician is able to contact you. Your physician or   their representative will relay the results to you with their   recommendations at their soonest availability.  Thank you,  PROVATION

## 2022-01-07 NOTE — ANESTHESIA PREPROCEDURE EVALUATION
01/07/2022  Jhoana Sierra is a 56 y.o., female.  Patient Active Problem List   Diagnosis    Encounter for screening colonoscopy    Positive FIT (fecal immunochemical test)    Adenocarcinoma of colon    Colon adenocarcinoma     Pre-op Assessment    I have reviewed the Patient Summary Reports.     I have reviewed the Nursing Notes. I have reviewed the NPO Status.   I have reviewed the Medications.     Review of Systems  Anesthesia Hx:  No problems with previous Anesthesia Denies Hx of Anesthetic complications  Denies Family Hx of Anesthesia complications.   Denies Personal Hx of Anesthesia complications.   Social:  Non-Smoker, No Alcohol Use    Cardiovascular:  Cardiovascular Normal  ECG has been reviewed.    Pulmonary:  Pulmonary Normal    Renal/:  Renal/ Normal     Hepatic/GI:  Hepatic/GI Normal    Neurological:  Neurology Normal    Endocrine:  Endocrine Normal    Psych:  Psychiatric Normal         Patient Active Problem List   Diagnosis    Encounter for screening colonoscopy    Positive FIT (fecal immunochemical test)    Adenocarcinoma of colon    Colon adenocarcinoma     Past Surgical History:   Procedure Laterality Date    COLONOSCOPY N/A 8/25/2020    Procedure: COLONOSCOPY;  Surgeon: Rianna Dillon MD;  [facility];  Service: Endoscopy;  Laterality: N/A;    FLEXIBLE SIGMOIDOSCOPY N/A 10/8/2020    Procedure: SIGMOIDOSCOPY, FLEXIBLE;  Surgeon: Ziggy Hannah MD;  Location: [facility];  Service: General;  Laterality: N/A;    INJECTION OF ANESTHETIC AGENT INTO TISSUE PLANE DEFINED BY TRANSVERSUS ABDOMINIS MUSCLE N/A 10/8/2020    Procedure: BLOCK, TRANSVERSUS ABDOMINIS PLANE;  Surgeon: Ziggy Hannah MD;  Location: [facility];  Service: General;  Laterality: N/A;    LAPAROSCOPIC SIGMOIDECTOMY N/A 10/8/2020    Procedure: COLECTOMY, SIGMOID, LAPAROSCOPIC;  Surgeon: Ziggy JAEGER  MD Brielle;  [facility];  Service: General;  Laterality: N/A;  lower anterior resection  Proctosigmoidectomy    LAPAROTOMY N/A 10/8/2020    Procedure: LAPAROTOMY;  Surgeon: Ziggy Hannah MD;  Location: [facility];  Service: General;  Laterality: N/A;    OVARIAN CYST REMOVAL      WISDOM TOOTH EXTRACTION           Physical Exam  General:  Well nourished    Airway/Jaw/Neck:  Airway Findings: Mouth Opening: Normal Tongue: Normal  General Airway Assessment: Adult  Mallampati: II  TM Distance: 4 - 6 cm  Jaw/Neck Findings:  Neck ROM: Normal ROM      Dental:  Dental Findings: In tact   Chest/Lungs:  Chest/Lungs Findings: Clear to auscultation, Normal Respiratory Rate     Heart/Vascular:  Heart Findings: Rate: Normal  Rhythm: Regular Rhythm  Sounds: Normal        Mental Status:  Mental Status Findings:  Cooperative, Alert and Oriented           Anesthesia Plan  Type of Anesthesia, risks & benefits discussed:  Anesthesia Type:  general, MAC    Patient's Preference:   Plan Factors:          Intra-op Monitoring Plan: standard ASA monitors  Intra-op Monitoring Plan Comments:   Post Op Pain Control Plan: per primary service following discharge from PACU  Post Op Pain Control Plan Comments:     Induction:   IV  Beta Blocker:  Patient is not currently on a Beta-Blocker (No further documentation required).       Informed Consent: Patient understands risks and agrees with Anesthesia plan.  Questions answered. Anesthesia consent signed with patient.  ASA Score: 2     Day of Surgery Review of History & Physical: I have interviewed and examined the patient. I have reviewed the patient's H&P dated:  There are no significant changes.  H&P update referred to the surgeon.         Ready For Surgery From Anesthesia Perspective.

## 2022-01-07 NOTE — DISCHARGE INSTRUCTIONS
Patient Education       Hemorrhoids   The Basics   Written by the doctors and editors at [facility]   What are hemorrhoids? -- Hemorrhoids are swollen veins in the rectum. They can cause itching, bleeding, and pain. Hemorrhoids are very common.  In some cases, you can see or feel hemorrhoids around the outside of the rectum. In other cases, you cannot see them because they are hidden inside the rectum (figure 1).  What are the symptoms of hemorrhoids? -- Hemorrhoids do not always cause symptoms. But when they do, symptoms can include:  · Itching of the skin around the anus  · Bleeding - Bleeding is usually painless. You might see bright red blood after using the toilet.  · Pain - If a blood clot forms inside a hemorrhoid, this can cause pain. It can also cause a lump that you might be able to feel.  Should I see a doctor or nurse? -- You should see a doctor or nurse if you have any bleeding or if your bowel movements look like tar. Bleeding could be caused by something other than hemorrhoids, so you should have it checked out.  If you do have hemorrhoids, your doctor or nurse can suggest treatments. But there some steps you can try on you your own first.  What can I do to keep from getting more hemorrhoids? -- The most important thing you can do is to keep from getting constipated. You should have a bowel movement at least a few times a week. When you have a bowel movement, you also should not have to push too much. Plus, your bowel movements should not be too hard.  Being constipated and having hard bowel movements can make hemorrhoids worse. Here are some steps you can take to avoid getting constipated or having hard stools:  · Eat lots of fruits, vegetables, and other foods with fiber (figure 2). Fiber helps to increase bowel movements.   You need 20 to 35 grams of fiber a day to keep your bowel movements regular (table 1). If you do not get enough fiber from your diet, you can take fiber supplements. These come in  "the form of powders, wafers, or pills. They include psyllium seed (sample brand names: Metamucil, Konsyl), methylcellulose (sample brand name: Citrucel), polycarbophil (sample brand name: FiberCon), and wheat dextrin (sample brand name: Benefiber). If you take a fiber supplement, be sure to read the label so you know how much to take. If you're not sure, ask your doctor nurse.  · Take medicines called "stool softeners" such as docusate sodium (sample brand names: Colace, Dulcolax). These medicines increase the number of bowel movements you have. They are safe to take and they can prevent problems later.  What can I do to reduce my symptoms? -- Some people feel better if they soak their buttocks in 2 or 3 inches of warm water. You can do this up to 2 to 3 times a day for 10 to 15 minutes. Do not add soap, bubble bath, or anything to the water.  There are also medicines that you can get without a prescription (table 2). They are usually creams or ointments that you rub on your anus to relieve pain, itching, and swelling. Some hemorrhoid medicines come in a capsule (called a suppository) that you put inside your rectum. Others come in a cream that comes in a bottle with a nozzle that you put inside your rectum. It is OK to try these medicines. But do not use medicines that have hydrocortisone (a steroid medicine) for more than a week, unless your doctor or nurse approves.  What if the self-care steps do not work? -- If you still have symptoms after trying the steps listed above, you might need treatments to destroy or remove the hemorrhoids.  One popular treatment for hemorrhoids inside the rectum is called "rubber band ligation." For this treatment, the doctor ties tiny rubber bands around the hemorrhoids. A few days later the hemorrhoids shrink and fall off. Doctors can also use lasers, heat, or chemicals to destroy hemorrhoids. But if none of these options works, your doctor might suggest surgery to remove the " "hemorrhoids. Hemorrhoids on the outside of the rectum can only be removed with surgery.  All topics are updated as new evidence becomes available and our peer review process is complete.  This topic retrieved from [employer] on: Sep 21, 2021.  Topic 90476 Version 13.0  Release: 29.4.2 - C29.263  © 2021 UpToDate, Inc. and/or its affiliates. All rights reserved.  figure 1: Hemorrhoids     Hemorrhoids that are hidden inside the rectum are called "internal" hemorrhoids. You cannot see them, but they can cause symptoms. Hemorrhoids that you can see or feel are called "external" hemorrhoids.  Graphic 49480 Version 2.0    figure 2: Foods with fiber     Foods with a lot of fiber include prunes, apples, oranges, bananas, peas, green beans, kidney beans, cooked oatmeal, almonds, peanuts, and whole-wheat bread.  Graphic 45791 Version 1.0    table 1: Amount of fiber in different foods  Food  Serving  Grams of fiber    Fruits    Apple (with skin) 1 medium apple 4.4   Banana 1 medium banana 3.1   Oranges 1 orange 3.1   Prunes 1 cup, pitted 12.4   Juices    Apple, unsweetened, with added ascorbic acid 1 cup 0.5   Grapefruit, white, canned, sweetened 1 cup 0.2   Grape, unsweetened, with added ascorbic acid 1 cup 0.5   Orange 1 cup 0.7   Vegetables    Cooked   · Green beans 1 cup 4.0   · Carrots 1/2 cup sliced 2.3   · Peas 1 cup 8.8   · Potato (baked, with skin) 1 medium potato 3.8   Raw   · Cucumber (with peel) 1 cucumber 1.5   · Lettuce 1 cup shredded 0.5   · Tomato 1 medium tomato 1.5   · Spinach 1 cup 0.7   Legumes   · Baked beans, canned, no salt added 1 cup 13.9   · Kidney beans, canned 1 cup 13.6   · Lima beans, canned 1 cup 11.6   · Lentils, boiled 1 cup 15.6   Breads, pastas, flours    Bran muffins 1 medium muffin 5.2   Oatmeal, cooked 1 cup 4.0   White bread 1 slice 0.6   Whole-wheat bread 1 slice 1.9   Pasta and rice, cooked   · Macaroni 1 cup 2.5   · Rice, brown 1 cup 3.5   · Rice, white 1 cup 0.6   · Spaghetti (regular) 1 " cup 2.5   Nuts    Almonds 1/2 cup 8.7   Peanuts 1/2 cup 7.9   To learn how much fiber and other nutrients are in different foods, visit the [place] [employer] ([employer] website.  Graphic 09517 Version 6.0  table 2: Common at-home treatments for hemorrhoids  Type of medicine  Examples  Notes    Stool softener Docusate sodium (sample brand names: Colace, Docu, Stool Softener) Softens bowel movements  Helps avoid straining while sitting on toilet   Bulk-forming laxatives and fiber supplements Methylcellulose (sample brand names: Citrucel, Soluble Fiber Therapy)  Polycarbophil (sample brand names: FiberCon, Konsyl Fiber)  Psyllium (sample brand names: Metamucil, Konsyl)  Wheat dextrin (sample brand name: Benefiber) Prevent hard dry stools which make hemorrhoids worse  Might reduce bleeding and other hemorrhoid symptoms  Needs to be taken with plenty of water (8 glasses of water a day)  Your doctor might suggest starting with a small dose and then increasing over time   Pain relief Pramoxine rectal foam, ointment, or wipes (sample brand names: Proctofoam, Pramox) Can help with pain and itching  Area must be gently cleaned and allowed to dry before using   Medicines to dry or protect skin Witch hazel (sample brand names: Tucks, Preparation H pads, Preparation H wipes)  Zinc oxide topical paste (sample brand names: Maria Del Carmen's Butt Paste, Desitin) May dry or tighten skin around the anus  Zinc oxide also protects skin from irritation  Area must be gently cleaned and allowed to dry before using  Can apply after a sitz bath (soaking in warm, shallow water)  Witch hazel wipes or unscented baby wipes can be used to clean the anus after a bowel movement   Steroid cream Hydrocortisone rectal cream (sample brand name: Preparation H hydrocortisone) Reduces swelling, and pain caused by hemorrhoids  Do not use for longer than a week  Do not use at all if you are pregnant unless your doctor or nurse  tells you to   Medicines to shrink hemorrhoids Phenylephrine ointment or suppository (sample brand names: Preparation H, Rectacaine) Phenylephrine shrinks swollen hemorrhoids, and relieves itching and discomfort for a few hours  Area must be gently cleaned and allowed to dry before applying   Numbing ointments Benzocaine rectal ointment (sample brand name: Americaine)  Dibucaine rectal ointment (sample brand name: Nupercainal)  Lidocaine rectal cream (sample brand name: RectiCare) Benzocaine, dibucaine, and lidocaine can help with pain and itching, but should not be used without talking to a doctor first. They should only be used once in a while, in small amounts.   · This table lists some examples of over-the-counter treatments for hemorrhoids. There are many more brand-name and generic versions available, too. Read all labels to make sure you're not using too much of one ingredient.  · Do not use over-the-counter treatments for more than one week without speaking to your doctor. They can damage your skin.  · Follow instructions carefully - for example, it's important to clean and dry your skin before using creams or ointments. You can use an unscented baby wipe to clean your anus after a bowel movement.  · If you have rectal bleeding, or if your bowel movements look like tar, see your doctor or nurse. These problems could be caused by something other than hemorrhoids. You should also see your doctor or nurse if your hemorrhoid symptoms still bother you after you have tried taking care of them yourself.  Graphic 152100 Version 7.0  Consumer Information Use and Disclaimer   This information is not specific medical advice and does not replace information you receive from your health care provider. This is only a brief summary of general information. It does NOT include all information about conditions, illnesses, injuries, tests, procedures, treatments, therapies, discharge instructions or life-style choices that may  "apply to you. You must talk with your health care provider for complete information about your health and treatment options. This information should not be used to decide whether or not to accept your health care provider's advice, instructions or recommendations. Only your health care provider has the knowledge and training to provide advice that is right for you. The use of this information is governed by the [employer] End User License Agreement, available at https://[email or web address]/en/solutions/Gigaclear/about/stacie.The use of [employer] content is governed by the [employer] Terms of Use. ©2021 UpToDate, Inc. All rights reserved.  Copyright   © 2021 UpToDate, Inc. and/or its affiliates. All rights reserved.  Patient Education       Diverticulosis   About this topic   Diverticulosis is a problem of the large bowel or colon. The wall of the bowel becomes weak and pushes outward. They form balloon-like pouches called diverticula or "tics." When you have hard stool, you strain to have a bowel movement. This raises the pressure in the bowel and causes pouches or bulges to form. Most often, they do not cause a problem. If they become infected, you have diverticulitis. If you have both bleeding and infection it is diverticular disease.     What are the causes?   Doctors do not know why these pouches form in the large bowel. Pressure may build up if food moves too slowly through the bowel. Fiber in your diet helps your stool move more quickly through your bowels. If you eat a diet low in fiber, your stools become harder and move slower. Then you strain and push more when you have a bowel movement, which may cause the pouches to form.  What can make this more likely to happen?   · A low-fiber diet  · A diet high in meat or protein  · Many hard stools ? Less bowel movements than normal  · Age 40 years or older  · Lack of exercise  · Genetics  · Obesity  · Smoking  · Certain drugs you may be taking  What are the main signs? "   Most people with diverticulosis do not have signs. Others have problems with diarrhea, constipation, bloating, or abdominal pain. Your doctor may find this condition when you have a colonoscopy.  How does the doctor diagnose this health problem?   Your doctor will take your history. Your doctor may ask about your bowel movements, belly pain, diet, and drugs you are taking. The doctor may press on your belly to see if you have any pain, especially on the left side. The doctor will listen to your bowel sounds and may do a rectal exam.  The doctor will do an exam and may order:  · Stool test  · Blood tests  · CT scan  · Colonoscopy  · Barium enema  How does the doctor treat this health problem?   Most often, people with diverticulosis will not need any treatment. Your doctor may suggest you add more fiber to your diet. This will help add to the amount of stool you form. It may also keep you from forming new diverticula.  Are there other health problems to treat?   Other health problems, such as irritable bowel syndrome or IBS, can have these same signs. So can ulcers. Talk with your doctor if you keep having any of the above signs.  What drugs may be needed?   Most often with diverticulosis you will not need to take any drugs.  What problems could happen?   You may develop diverticulitis, which may cause:  · Fever and pain in the left lower part of your abdomen  · Pockets or pouches in your bowel may be infected or filled with pus.  · Hole or tear in your bowel  · Part of your bowel to become narrow  · You to need surgery  What can be done to prevent this health problem?   The best way to keep from having diverticulosis is to keep your bowel movements soft and normal. To keep more pouches from forming:  · Eat more whole grains, vegetables, and fruits.  · Drink 8 to 10 glasses of water each day.  · Be active. Walk, garden, or do something active for 30 minutes or more on most days of the week.  · Talk with your doctor  about adding an over-the-counter (OTC) fiber product to keep your stools soft.  · Limit how much pain drugs you take. Overuse of some pain drugs can cause hard stools. Talk with your doctor about this.  Where can I learn more?   FamilyDoctor.org  http://familydoctor.org/familydoctor/en/diseases-conditions/diverticular-disease.html   [facility]  https://www.nhs.[place]/conditions/diverticular-disease-and-diverticulitis/   Last Reviewed Date   2021-04-13  Consumer Information Use and Disclaimer   This information is not specific medical advice and does not replace information you receive from your health care provider. This is only a brief summary of general information. It does NOT include all information about conditions, illnesses, injuries, tests, procedures, treatments, therapies, discharge instructions or life-style choices that may apply to you. You must talk with your health care provider for complete information about your health and treatment options. This information should not be used to decide whether or not to accept your health care providers advice, instructions or recommendations. Only your health care provider has the knowledge and training to provide advice that is right for you.  Copyright   Copyright © 2021 UpToDate, Inc. and its affiliates and/or licensors. All rights reserved.  Patient Education       Colon Polyps   The Basics   Written by the doctors and editors at [facility]   What are colon polyps? -- Colon polyps are tiny growths that form on the inside of the large intestine (also known as the colon) (figure 1). Polyps are very common. About one-third to one-half of all adults have them by the time they are 50 years old. They do not usually cause symptoms. But some polyps can be or become cancer, so doctors sometimes remove them.  What are the symptoms of colon polyps? -- Colon polyps do not usually cause symptoms.  How do doctors find colon polyps? -- Doctors usually find colon polyps when they are  "doing screening tests to check for colon or rectal cancer. Cancer screening tests are tests that are done to try and find cancer early, before a person has symptoms. The screening tests for colon and rectal cancer include:  · Colonoscopy - Before having a colonoscopy, you will get medicine to help you relax. Then a doctor will put a thin tube into your anus and advance it into your colon (figure 2). The tube has a camera attached to it, so the doctor can look inside your colon. The tube also has tools on the end, so the doctor can remove pieces of tissue, including polyps. After polyps are removed, they usually go to a lab to be tested for cancer and other problems.  · Sigmoidoscopy - A sigmoidoscopy is very similar to a colonoscopy. The only difference is that this test looks only at the first part of the colon, and a colonoscopy looks at the whole colon.  · CT colonography (also known as virtual colonoscopy) - For a virtual colonoscopy, you have a special kind of X-ray taken, called a "CT scan." This test creates pictures of the colon.  · Stool test - "Stool" is another word for "bowel movements." Stool tests check for blood or abnormal genes in samples of stool. If a stool test indicates that something might be wrong with the colon, doctors usually follow up with a colonoscopy. Then doctors find polyps, if they are there.  · Capsule colonoscopy - Rarely, your doctor might do something called a "capsule" colonoscopy. For this test, you swallow a special capsule that contains tiny wireless video cameras.   How are colon polyps treated? -- Doctors remove polyps using the same tools they use for a colonoscopy. They can remove polyps either by snipping them off with a special cutting tool, or by catching the polyps in a noose (figure 3). Most polyps can be removed during a colonoscopy. But sometimes, large polyps need to be removed at a later time, either with another colonoscopy or with surgery.  What happens after I " "have polyps removed? -- You might need to have a colonoscopy every few years to check for more polyps. In some people polyps come back. And if you had the kind of polyps that could become cancer, your doctor will want to remove them as they appear. Also, if the polyps you had removed were the kind that could become cancer, people in your family might need to be checked for polyps and colon cancer earlier than if you did not have polyps.  Depending on your situation, your doctor might suggest genetic testing. This can show if your polyps are related to a specific gene that runs in families. If this turns out to be the case, they might recommend other tests that can be done to prevent cancer or find it early.  Can colon polyps be prevented? -- To reduce your chances of getting polyps or colon cancer:  · Eat a diet that is low in fat and high in fruits, vegetables, and fiber  · Lose weight, if you are overweight  · Do not smoke  · Limit the amount of alcohol you drink  All topics are updated as new evidence becomes available and our peer review process is complete.  This topic retrieved from [employer] on: Sep 21, 2021.  Topic 50002 Version 8.0  Release: 29.4.2 - C29.263  © 2021 UpToDate, Inc. and/or its affiliates. All rights reserved.  figure 1: Digestive system     This drawing shows the organs in the body that process food. Together these organs are called "the digestive system," or "digestive tract." As food travels through this system, the body absorbs nutrients and water.  Graphic 74480 Version 4.0    figure 2: Colonoscopy     During a colonoscopy, you lie on your side and the doctor puts a thin tube with a camera into your anus (from behind). Then the doctor advances the tube into the rectum and colon. The camera sends pictures from inside your colon to a television screen.  Graphic 91966 Version 6.0    figure 3: Removing a colon polyp     One way doctors remove colon polyps is to use a noose as a tool. They loop " a wire around the polyp and squeeze the loop tight. When the polyp comes off, the doctor sucks it up into the endoscope, so that it can go to the lab for tests.  Graphic 94253 Version 5.0    Consumer Information Use and Disclaimer   This information is not specific medical advice and does not replace information you receive from your health care provider. This is only a brief summary of general information. It does NOT include all information about conditions, illnesses, injuries, tests, procedures, treatments, therapies, discharge instructions or life-style choices that may apply to you. You must talk with your health care provider for complete information about your health and treatment options. This information should not be used to decide whether or not to accept your health care provider's advice, instructions or recommendations. Only your health care provider has the knowledge and training to provide advice that is right for you. The use of this information is governed by the [employer] End User License Agreement, available at https://www.[email or web address].Trendient/en/solutions/Chirpme/about/stacie.The use of [employer] content is governed by the [employer] Terms of Use. ©2021 UpToDate, Inc. All rights reserved.  Copyright   © 2021 UpToDate, Inc. and/or its affiliates. All rights reserved.

## 2022-01-07 NOTE — ANESTHESIA POSTPROCEDURE EVALUATION
Anesthesia Post Evaluation    Patient: Jhoana Sierra    Procedure(s) Performed: Procedure(s) (LRB):  COLONOSCOPY (N/A)    Final Anesthesia Type: MAC      Patient location during evaluation: GI PACU  Patient participation: Yes- Able to Participate  Level of consciousness: awake and alert  Post-procedure vital signs: reviewed and stable  Pain management: adequate  Airway patency: patent  VALENTINO mitigation strategies: Multimodal analgesia  PONV status at discharge: No PONV  Anesthetic complications: no      Cardiovascular status: hemodynamically stable  Respiratory status: unassisted  Hydration status: euvolemic  Follow-up not needed.          Vitals Value Taken Time   /54 01/07/22 0900   Temp 36.7 °C (98 °F) 01/07/22 0849   Pulse 80 01/07/22 0900   Resp 18 01/07/22 0900   SpO2 98 % 01/07/22 0900         No case tracking events are documented in the log.      Pain/Jackson Score: Jackson Score: 9 (1/7/2022  8:50 AM)

## 2022-01-07 NOTE — TRANSFER OF CARE
"Anesthesia Transfer of Care Note    Patient: Jhoana Sierra    Procedure(s) Performed: Procedure(s) (LRB):  COLONOSCOPY (N/A)    Patient location: PACU    Anesthesia Type: MAC    Transport from OR: Transported from OR on room air with adequate spontaneous ventilation    Post pain: adequate analgesia    Post assessment: no apparent anesthetic complications    Post vital signs: stable    Level of consciousness: awake    Complications: none    Transfer of care protocol was followed      Last vitals:   Visit Vitals  BP (!) 170/77 (BP Location: Left arm, Patient Position: Lying)   Pulse 101   Temp 36.7 °C (98 °F) (Temporal)   Resp 17   Ht 5' 4" (1.626 m)   Wt 73.9 kg (163 lb)   LMP 08/25/2017   SpO2 100%   Breastfeeding No   BMI 27.98 kg/m²     "

## 2022-01-07 NOTE — H&P
[facility] ([facility])  Colon and Rectal Surgery  History & Physical    Patient Name: Jhoana Sierra  MRN: [id number]  Admission Date: 1/7/2022  Attending Physician: Ziggy Hannah MD  Primary Care Provider: Dhruv Nguyen MD    Patient information was obtained from patient and medical records.    Subjective:     Chief Complaint/Reason for Admission: Here for Colonoscopy    History of Present Illness:  Patient is a 56 y.o. female presents for colonoscopy. Previous colon cancer s/p resection. No hematochezia, melena or change in bowel habits.    No current facility-administered medications on file prior to encounter.     Current Outpatient Medications on File Prior to Encounter   Medication Sig    ascorbic acid, vitamin C, (VITAMIN C) 500 MG tablet Take 500 mg by mouth once daily.    cholecalciferol, vitamin D3, (VITAMIN D3) 25 mcg (1,000 unit) capsule Take 1,000 Units by mouth once daily.    multivitamin with minerals tablet Take 1 tablet by mouth once daily.    tiZANidine (ZANAFLEX) 4 MG tablet Take 1 tablet (4 mg total) by mouth 3 (three) times daily as needed (muscle spasm).    pseudoephedrine (SUDAFED) 30 MG tablet Take 30 mg by mouth every 4 (four) hours as needed for Congestion.       Review of patient's allergies indicates:  No Known Allergies    Past Medical History:   Diagnosis Date    Adenocarcinoma of colon     PONV (postoperative nausea and vomiting)      Past Surgical History:   Procedure Laterality Date    COLONOSCOPY N/A 8/25/2020    Procedure: COLONOSCOPY;  Surgeon: Rianna Dillon MD;  Location: [facility];  Service: Endoscopy;  Laterality: N/A;    FLEXIBLE SIGMOIDOSCOPY N/A 10/8/2020    Procedure: SIGMOIDOSCOPY, FLEXIBLE;  Surgeon: Ziggy Hannah MD;  Location: [facility];  Service: General;  Laterality: N/A;    INJECTION OF ANESTHETIC AGENT INTO TISSUE PLANE DEFINED BY TRANSVERSUS ABDOMINIS MUSCLE N/A 10/8/2020    Procedure: BLOCK, TRANSVERSUS ABDOMINIS PLANE;  Surgeon:  Ziggy Hannah MD;  Location: [facility] OR;  Service: General;  Laterality: N/A;    LAPAROSCOPIC SIGMOIDECTOMY N/A 10/8/2020    Procedure: COLECTOMY, SIGMOID, LAPAROSCOPIC;  Surgeon: Ziggy Hannah MD;  Location: [facility] OR;  Service: General;  Laterality: N/A;  lower anterior resection  Proctosigmoidectomy    LAPAROTOMY N/A 10/8/2020    Procedure: LAPAROTOMY;  Surgeon: Ziggy Hannah MD;  Location: [facility] OR;  Service: General;  Laterality: N/A;    OVARIAN CYST REMOVAL      WISDOM TOOTH EXTRACTION       Family History     Problem Relation (Age of Onset)    Fibromyalgia Sister        Tobacco Use    Smoking status: Never Smoker    Smokeless tobacco: Never Used   Substance and Sexual Activity    Alcohol use: Yes     Comment: Occ use; HOLD 72HRS PRIOR TO SURGERY    Drug use: No    Sexual activity: Yes     Partners: Male     Review of Systems   Constitutional: Negative for activity change, appetite change, chills, fatigue, fever and unexpected weight change.   HENT: Negative for congestion, ear pain, sore throat and trouble swallowing.    Eyes: Negative for pain, redness and itching.   Respiratory: Negative for cough, shortness of breath and wheezing.    Cardiovascular: Negative for chest pain, palpitations and leg swelling.   Gastrointestinal: Negative for abdominal distention, abdominal pain, anal bleeding, blood in stool, constipation, diarrhea, nausea, rectal pain and vomiting.   Endocrine: Negative for cold intolerance, heat intolerance and polyuria.   Genitourinary: Negative for dysuria, flank pain, frequency and hematuria.   Musculoskeletal: Negative for gait problem, joint swelling and neck pain.   Skin: Negative for color change, rash and wound.   Allergic/Immunologic: Negative for environmental allergies and immunocompromised state.   Neurological: Negative for dizziness, speech difficulty, weakness and numbness.   Psychiatric/Behavioral: Negative for agitation, confusion and hallucinations.      Objective:     Vital Signs (Most Recent):  Temp: 98 °F (36.7 °C) (01/07/22 0849)  Pulse: (!) 58 (01/07/22 0911)  Resp: 18 (01/07/22 0911)  BP: (!) 156/68 (01/07/22 0911)  SpO2: 98 % (01/07/22 0911) Vital Signs (24h Range):  Temp:  [98 °F (36.7 °C)] 98 °F (36.7 °C)  Pulse:  [] 58  Resp:  [17-18] 18  SpO2:  [97 %-100 %] 98 %  BP: (106-170)/(54-77) 156/68     Weight: 73.9 kg (163 lb)  Body mass index is 27.98 kg/m².    Physical Exam  Constitutional:       Appearance: She is well-developed.   HENT:      Head: Normocephalic and atraumatic.   Eyes:      Extraocular Movements: EOM normal.      Conjunctiva/sclera: Conjunctivae normal.   Neck:      Thyroid: No thyromegaly.   Cardiovascular:      Rate and Rhythm: Normal rate and regular rhythm.   Pulmonary:      Effort: Pulmonary effort is normal. No respiratory distress.   Abdominal:      General: There is no distension.      Palpations: Abdomen is soft. There is no mass.      Tenderness: There is no abdominal tenderness.   Musculoskeletal:         General: No tenderness or edema. Normal range of motion.      Cervical back: Normal range of motion.   Skin:     General: Skin is warm and dry.      Capillary Refill: Capillary refill takes less than 2 seconds.      Findings: No rash.   Neurological:      Mental Status: She is alert and oriented to person, place, and time.   Psychiatric:         Mood and Affect: Mood and affect normal.           Assessment/Plan:     Patient is a 56 y.o. female who presents for colonoscopy     - Ok to proceed to endoscopy suite for colonoscopy  - Consent obtained. All risks, benefits and alternatives fully explained to patient, including but not limited to bleeding, infection, perforation, and missed polyps. All questions appropriately answered to patient's satisfaction. Consent signed and placed on chart.    There are no hospital problems to display for this patient.    VTE Risk Mitigation (From admission, onward)    None           Ziggy Hannah MD  Colon and Rectal Surgery  [facility] ([facility])

## 2022-01-10 VITALS
HEART RATE: 58 BPM | HEIGHT: 64 IN | WEIGHT: 163 LBS | DIASTOLIC BLOOD PRESSURE: 68 MMHG | SYSTOLIC BLOOD PRESSURE: 156 MMHG | OXYGEN SATURATION: 98 % | BODY MASS INDEX: 27.83 KG/M2 | TEMPERATURE: 98 F | RESPIRATION RATE: 18 BRPM

## 2022-01-12 LAB
FINAL PATHOLOGIC DIAGNOSIS: NORMAL
GROSS: NORMAL
Lab: NORMAL

## 2022-01-18 ENCOUNTER — TELEPHONE (OUTPATIENT)
Dept: RADIOLOGY | Facility: HOSPITAL | Age: 57
End: 2022-01-18
Payer: COMMERCIAL

## 2022-01-19 ENCOUNTER — HOSPITAL ENCOUNTER (OUTPATIENT)
Dept: RADIOLOGY | Facility: HOSPITAL | Age: 57
Discharge: HOME | End: 2022-01-19
Attending: COLON & RECTAL SURGERY
Payer: COMMERCIAL

## 2022-01-19 DIAGNOSIS — Z85.038 PERSONAL HISTORY OF COLON CANCER: ICD-10-CM

## 2022-01-19 DIAGNOSIS — C18.9 ADENOCARCINOMA OF COLON: ICD-10-CM

## 2022-01-19 PROCEDURE — A9698 NON-RAD CONTRAST MATERIALNOC: HCPCS | Performed by: COLON & RECTAL SURGERY

## 2022-01-19 PROCEDURE — 71260 CT CHEST ABDOMEN PELVIS WITH CONTRAST (XPD): ICD-10-PCS | Mod: 26,,, | Performed by: RADIOLOGY

## 2022-01-19 PROCEDURE — 71260 CT THORAX DX C+: CPT | Mod: 26,,, | Performed by: RADIOLOGY

## 2022-01-19 PROCEDURE — 74177 CT ABD & PELVIS W/CONTRAST: CPT | Mod: 26,,, | Performed by: RADIOLOGY

## 2022-01-19 PROCEDURE — 74177 CT ABD & PELVIS W/CONTRAST: CPT | Mod: TC

## 2022-01-19 PROCEDURE — 71260 CT THORAX DX C+: CPT | Mod: TC

## 2022-01-19 PROCEDURE — 74177 CT CHEST ABDOMEN PELVIS WITH CONTRAST (XPD): ICD-10-PCS | Mod: 26,,, | Performed by: RADIOLOGY

## 2022-01-19 PROCEDURE — 25500020 PHARM REV CODE 255: Performed by: COLON & RECTAL SURGERY

## 2022-01-19 RX ADMIN — IOHEXOL 75 ML: 350 INJECTION, SOLUTION INTRAVENOUS at 10:01

## 2022-01-19 RX ADMIN — IOHEXOL 1000 ML: 12 SOLUTION ORAL at 09:01

## 2022-01-28 ENCOUNTER — PATIENT MESSAGE (OUTPATIENT)
Dept: INTERNAL MEDICINE | Facility: CLINIC | Age: 57
End: 2022-01-28
Payer: COMMERCIAL

## 2022-02-02 ENCOUNTER — TELEPHONE (OUTPATIENT)
Dept: INTERNAL MEDICINE | Facility: CLINIC | Age: 57
End: 2022-02-02
Payer: COMMERCIAL

## 2022-02-02 NOTE — TELEPHONE ENCOUNTER
Called patient to scheduled appt with Rocío and fax form for medical records from [facility]

## 2022-02-02 NOTE — TELEPHONE ENCOUNTER
----- Message from Jeannie Calixto sent at 2/2/2022 12:52 PM CST -----  Regarding: Call Back  Pt was scheduled with Dr Gonzalez on this Friday. I called pt to reschedule that appt. Pt said it was to follow up from a heart attack called CASTRO. She had the heart attack on 1-22 from what I understood. There were no openings with Dr MILLS until 3-8. Pt wanted to see if there's anyway Dr MILLS could see her on Monday coming up since her  can bring her then and she really wants to follow up because of her heart attack.  Please call pt back at [phone] in reference to this.

## 2022-02-03 ENCOUNTER — OFFICE VISIT (OUTPATIENT)
Dept: HEMATOLOGY/ONCOLOGY | Facility: CLINIC | Age: 57
End: 2022-02-03
Payer: COMMERCIAL

## 2022-02-03 VITALS
HEART RATE: 66 BPM | SYSTOLIC BLOOD PRESSURE: 121 MMHG | TEMPERATURE: 98 F | WEIGHT: 171.94 LBS | RESPIRATION RATE: 18 BRPM | HEIGHT: 64 IN | BODY MASS INDEX: 29.35 KG/M2 | DIASTOLIC BLOOD PRESSURE: 71 MMHG | OXYGEN SATURATION: 98 %

## 2022-02-03 DIAGNOSIS — K52.832 LYMPHOCYTIC COLITIS: ICD-10-CM

## 2022-02-03 DIAGNOSIS — C18.9 ADENOCARCINOMA OF COLON: ICD-10-CM

## 2022-02-03 PROCEDURE — 3078F PR MOST RECENT DIASTOLIC BLOOD PRESSURE < 80 MM HG: ICD-10-PCS | Mod: CPTII,S$GLB,, | Performed by: INTERNAL MEDICINE

## 2022-02-03 PROCEDURE — 99215 OFFICE O/P EST HI 40 MIN: CPT | Mod: S$GLB,,, | Performed by: INTERNAL MEDICINE

## 2022-02-03 PROCEDURE — 3008F BODY MASS INDEX DOCD: CPT | Mod: CPTII,S$GLB,, | Performed by: INTERNAL MEDICINE

## 2022-02-03 PROCEDURE — 3078F DIAST BP <80 MM HG: CPT | Mod: CPTII,S$GLB,, | Performed by: INTERNAL MEDICINE

## 2022-02-03 PROCEDURE — 3074F PR MOST RECENT SYSTOLIC BLOOD PRESSURE < 130 MM HG: ICD-10-PCS | Mod: CPTII,S$GLB,, | Performed by: INTERNAL MEDICINE

## 2022-02-03 PROCEDURE — 3074F SYST BP LT 130 MM HG: CPT | Mod: CPTII,S$GLB,, | Performed by: INTERNAL MEDICINE

## 2022-02-03 PROCEDURE — 99999 PR PBB SHADOW E&M-EST. PATIENT-LVL III: ICD-10-PCS | Mod: PBBFAC,,, | Performed by: INTERNAL MEDICINE

## 2022-02-03 PROCEDURE — 1159F PR MEDICATION LIST DOCUMENTED IN MEDICAL RECORD: ICD-10-PCS | Mod: CPTII,S$GLB,, | Performed by: INTERNAL MEDICINE

## 2022-02-03 PROCEDURE — 99215 PR OFFICE/OUTPT VISIT, EST, LEVL V, 40-54 MIN: ICD-10-PCS | Mod: S$GLB,,, | Performed by: INTERNAL MEDICINE

## 2022-02-03 PROCEDURE — 99999 PR PBB SHADOW E&M-EST. PATIENT-LVL III: CPT | Mod: PBBFAC,,, | Performed by: INTERNAL MEDICINE

## 2022-02-03 PROCEDURE — 3008F PR BODY MASS INDEX (BMI) DOCUMENTED: ICD-10-PCS | Mod: CPTII,S$GLB,, | Performed by: INTERNAL MEDICINE

## 2022-02-03 PROCEDURE — 1159F MED LIST DOCD IN RCRD: CPT | Mod: CPTII,S$GLB,, | Performed by: INTERNAL MEDICINE

## 2022-02-03 NOTE — ASSESSMENT & PLAN NOTE
Stage I (pT2, pN0, cM0) colon adenocarcinoma, status post laparoscopic low anterior resection on 10/08/2020.  Discussed with patient that there is no role for adjuvant systemic therapy.  Recent CT scan of chest abdomen and pelvis performed on 01/19/2022 showed no evidence of recurrent or distant disease.    Colonoscopy from 01/07/2022 showed lymphocytic colitis otherwise unremarkable.  Reviewed labs, no concerning cytopenia.  Return in 1 year.

## 2022-02-03 NOTE — PROGRESS NOTES
Subjective:   Date of Visit: 2/3/22   ?   ?    REFERRING PROVIDER: No referring provider defined for this encounter.   ?   CHIEF COMPLAINT: No chief complaint on file.  ???????   ?   ONCOLOGIC DIAGNOSIS:    FINAL PATHOLOGY DIAGNOSIS:  09/02/2020    RECTOSIGMOID MASS, BIOPSY:   Adenocarcinoma   The biopsy shows a single minute fragment showing infiltrative gland in the stroma in the backgrounf of multiple fragments of high-grade dysplastic colonic mucosa. Findings are in keeping with adenocarcinoma.   Tumor is extremely limited for a meaningful interpretation of MMR-IHC and it's recommended to be performed on the resection specimen.  ?     HPI:  57-year-old female with past medical history significant for chronic allergy was seen by her PCP on 06/08/2020 for wellness exam.  At that time she was advised to obtain screening colonoscopy given positive iFOBT and mammographic breast evaluation.    Screening colonoscopy was performed on 08/25/2020 and a partially obstructing tumor in the rectosigmoid colon was found and biopsied.  The biopsy showed a single minute fragment showing infiltrative gland in the stroma in the background of multiple fragments of high grade dysplastic colonic mucosa, keeping with adenocarcinoma.  Given the limited nature of the sample, immunohistochemistry for MMR status was not performed.    She was referred to follow-up with oncology, recommended NO chemotherapy. Has been on surveillance. Had recent repeat colonoscopy by Dr. Hannah. Recently seen at [facility] for spontaneous coronary artery dissection, s/p cardiac cath at [facility] (no records yet). Today she denies SOB, chest pain, melena or hematochezia.  She denies unintentional weight loss, nausea or vomiting, abdominal pain, diarrhea or dysuria.    Review of Systems   Constitutional: Negative for activity change, appetite change, chills, fatigue, fever and unexpected weight change.   HENT: Negative for hearing loss, mouth sores, nosebleeds, sore  throat, tinnitus, trouble swallowing and voice change.    Eyes: Negative for visual disturbance.   Respiratory: Negative for cough, chest tightness and shortness of breath.    Cardiovascular: Negative for chest pain, palpitations and leg swelling.   Gastrointestinal: Negative for abdominal pain, anal bleeding, blood in stool, constipation, diarrhea, nausea and vomiting.   Genitourinary: Negative for dysuria, frequency, hematuria, pelvic pain, vaginal bleeding and vaginal pain.   Musculoskeletal: Negative for arthralgias, back pain, joint swelling and neck pain.   Skin: Negative for color change, pallor, rash and wound.   Allergic/Immunologic: Negative for immunocompromised state.   Neurological: Negative for dizziness, tremors, syncope, speech difficulty, weakness, light-headedness and headaches.   Hematological: Negative for adenopathy. Does not bruise/bleed easily.   Psychiatric/Behavioral: Negative for agitation, confusion, decreased concentration, hallucinations and sleep disturbance. The patient is not nervous/anxious.        ?   PAST MEDICAL HISTORY:   Past Medical History:   Diagnosis Date    Adenocarcinoma of colon     PONV (postoperative nausea and vomiting)     ?     PAST SURGICAL HISTORY:   Past Surgical History:   Procedure Laterality Date    COLONOSCOPY N/A 8/25/2020    Procedure: COLONOSCOPY;  Surgeon: Rianna Dillon MD;  Location: [facility];  Service: Endoscopy;  Laterality: N/A;    COLONOSCOPY N/A 1/7/2022    Procedure: COLONOSCOPY;  Surgeon: Ziggy Hannah MD;  Location: [facility];  Service: General;  Laterality: N/A;    FLEXIBLE SIGMOIDOSCOPY N/A 10/8/2020    Procedure: SIGMOIDOSCOPY, FLEXIBLE;  Surgeon: Ziggy Hannah MD;  Location: [facility] OR;  Service: General;  Laterality: N/A;    INJECTION OF ANESTHETIC AGENT INTO TISSUE PLANE DEFINED BY TRANSVERSUS ABDOMINIS MUSCLE N/A 10/8/2020    Procedure: BLOCK, TRANSVERSUS ABDOMINIS PLANE;  Surgeon: Ziggy Hannah MD;  Location: [facility]  OR;  Service: General;  Laterality: N/A;    LAPAROSCOPIC SIGMOIDECTOMY N/A 10/8/2020    Procedure: COLECTOMY, SIGMOID, LAPAROSCOPIC;  Surgeon: Ziggy Hannah MD;  Location: [facility] OR;  Service: General;  Laterality: N/A;  lower anterior resection  Proctosigmoidectomy    LAPAROTOMY N/A 10/8/2020    Procedure: LAPAROTOMY;  Surgeon: Ziggy Hannah MD;  Location: [facility] OR;  Service: General;  Laterality: N/A;    OVARIAN CYST REMOVAL      WISDOM TOOTH EXTRACTION        ?   ALLERGIES:   Allergies as of 02/03/2022    (No Known Allergies)      ?   MEDICATIONS:?   Outpatient Medications Marked as Taking for the 2/3/22 encounter (Office Visit) with Duc Stuart MD   Medication Sig Dispense Refill    ascorbic acid, vitamin C, (VITAMIN C) 500 MG tablet Take 500 mg by mouth once daily.      cholecalciferol, vitamin D3, (VITAMIN D3) 25 mcg (1,000 unit) capsule Take 1,000 Units by mouth once daily.      meloxicam (MOBIC) 15 MG tablet TAKE 1 TABLET BY MOUTH ONCE DAILY AS NEEDED FOR PAIN 90 tablet 0    tiZANidine (ZANAFLEX) 4 MG tablet Take 1 tablet (4 mg total) by mouth 3 (three) times daily as needed (muscle spasm). 30 tablet 0      ?   SOCIAL HISTORY:?   Social History     Tobacco Use    Smoking status: Never Smoker    Smokeless tobacco: Never Used   Substance Use Topics    Alcohol use: Yes     Comment: Occ use; HOLD 72HRS PRIOR TO SURGERY        ?   FAMILY HISTORY:   family history includes Fibromyalgia in her sister.   ?     Objective:      Physical Exam  Constitutional:       General: She is not in acute distress.     Appearance: She is well-developed. She is not ill-appearing or toxic-appearing.   HENT:      Head: Normocephalic and atraumatic.      Mouth/Throat:      Pharynx: No oropharyngeal exudate.   Eyes:      General: No scleral icterus.        Right eye: No discharge.         Left eye: No discharge.      Conjunctiva/sclera: Conjunctivae normal.      Pupils: Pupils are equal, round, and reactive  to light.   Neck:      Thyroid: No thyromegaly.   Cardiovascular:      Rate and Rhythm: Normal rate and regular rhythm.      Heart sounds: No murmur heard.      Pulmonary:      Effort: Pulmonary effort is normal. No respiratory distress.      Breath sounds: Normal breath sounds.   Chest:      Chest wall: No tenderness.   Breasts:      Right: No supraclavicular adenopathy.      Left: No supraclavicular adenopathy.       Abdominal:      General: Bowel sounds are normal. There is no distension.      Palpations: Abdomen is soft. There is no mass.      Tenderness: There is no abdominal tenderness. There is no guarding or rebound.   Musculoskeletal:         General: No tenderness. Normal range of motion.      Cervical back: Normal range of motion and neck supple.   Lymphadenopathy:      Cervical: No cervical adenopathy.      Right cervical: No superficial cervical adenopathy.     Left cervical: No superficial cervical adenopathy.      Upper Body:      Right upper body: No supraclavicular or pectoral adenopathy.      Left upper body: No supraclavicular or pectoral adenopathy.   Skin:     General: Skin is warm and dry.      Capillary Refill: Capillary refill takes 2 to 3 seconds.      Coloration: Skin is not pale.      Findings: No erythema or rash.   Neurological:      Mental Status: She is alert and oriented to person, place, and time.      Cranial Nerves: No cranial nerve deficit.      Sensory: No sensory deficit.   Psychiatric:         Behavior: Behavior normal. Behavior is cooperative.         Judgment: Judgment normal.       ?   Vitals:    02/03/22 1008   BP: 121/71   Pulse: 66   Resp: 18   Temp: 98.2 °F (36.8 °C)      ?     ECOG SCORE    1 - Restricted in strenuous activity-ambulatory and able to carry out work of a light nature         Laboratory:  ?   No visits with results within 1 Day(s) from this visit.   Latest known visit with results is:   Admission on 01/07/2022, Discharged on 01/07/2022   Component Date  Value Ref Range Status    SARS Coronavirus 2 Antigen 01/07/2022 Negative  Negative Final     Acceptable 01/07/2022 Yes   Final    Final Pathologic Diagnosis 01/07/2022    Final                    Value:Colon, hepatic flexure polypectomy (biopsy):  - Lymphocytic colitis  - Prominent lymphoid aggregate  - Negative for adenomatous epithelium      Gross 01/07/2022    Final                    Value:Surgery ID:  [id number];  Pathology ID:  [id number]  1. Received in formalin labeled hepatic flexure polypectomy, is 1 fragment, 4  mm in greatest dimension. Submitted entirely.  [id number]  Grossed by: Sima Peck      Disclaimer 01/07/2022    Final                    Value:Unless the case is a 'gross only' or additional testing only, the final  diagnosis for each specimen is based on a microscopic examination of  appropriate tissue sections.        ?   Tumor markers   ?   ?   Imaging: CT Chest Abdomen Pelvis With Contrast  Narrative: EXAMINATION:  CT CHEST ABDOMEN PELVIS WITH CONTRAST (XPD)    CLINICAL HISTORY:  previous colon cancer, eval for recurrence or metastatic disease;Malignant neoplasm of colon, unspecified    TECHNIQUE:  Low-dose CT of the chest with contrast and CT abdomen and pelvis with and without contrast was acquired helically from the lung apices through the ischial tuberosities status post administration of 75 cc of Omnipaque 350. Oral contrast was administered.  Axial and reformatted images were reviewed.    COMPARISON:  None    FINDINGS:  CHEST    No infiltrates or pleural effusions are identified.  There are a couple of stable subpleural less than 3 mm noncalcified nodule seen within either lung.  There are also a couple of tiny calcified granulomas seen within either lung.    The ascending aorta is mildly dilated but not considered aneurysmal and measures approximately 3.6 cm.  There is no pericardial effusion.  No enlarged mediastinal, hilar or axillary lymph nodes are  identified.    ABDOMEN    Liver/gallbladder/biliary: The liver demonstrates no focal abnormality. The gallbladder is present and unremarkable.  No biliary ductal dilation.    Pancreas: The pancreas is unremarkable in appearance.    Spleen: The spleen is not enlarged.    Adrenals: Unremarkable    Kidneys: The kidneys are equally perfused and demonstrate no solid masses.  Small probable cyst seen within the upper pole of the left kidney.  There is a nonobstructing upper pole left renal calculus that measures approximately 3 mm in size.    Bowel/Mesentery: There is no evidence of bowel obstruction. Postsurgical changes seen in the region of the sigmoid colon with the linear intraluminal density noted in the region of postsurgical changes as well.  No mesenteric stranding or adenopathy.    Retroperitoneum: No adenopathy.  The aorta demonstrates a normal caliber.    PELVIS    Genitourinary/Reproductive organs: A right adnexal cyst is again noted that measures up to approximately 3.9 cm in size.    Adenopathy: None    Free Fluid: No free fluid    Osseus Structures/Soft tissues: No suspicious appearing osseus lesions. No significant soft tissue abnormality.  Impression: 1. No evidence to suggest metastatic disease.  2. Postsurgical changes noted in the region of the sigmoid colon.  3. Remaining findings as discussed above.    Electronically signed by: Sunny Garnica DO  Date:    01/19/2022  Time:    10:47     ?      Pathology:  Pathology Results  (Last 10 years)               01/07/22 0846  Specimen to Pathology, Surgery Gastrointestinal tract Final result    Narrative:  Pre-op Diagnosis: Adenocarcinoma of colon [C18.9]   Personal history of colon cancer [Z85.038]   Procedure(s):   COLONOSCOPY   1. Hepatic flexure polypectomy   Release to patient->Immediate   Specimen total (fresh, frozen, permanent):->1       10/08/20 1130  Specimen to Pathology, Surgery Gastrointestinal tract Final result    Narrative:  Pre-op Diagnosis:  Colon adenocarcinoma [C18.9]   Procedure(s):   LAPAROSCOPIC SIGMOID COLECTOMY   BLOCK, TRANSVERSUS ABDOMINIS PLANE   MOBILIZATION, SPLENIC FLEXURE   Number of specimens: 2   Name of specimens: 1. Sigmoid colon and upper rectum (perm)   2.  Anastomotic donuts   (perm)   Specimen total (fresh, frozen, permanent):->2       08/25/20 1024  Specimen to Pathology, Surgery Gastrointestinal tract Final result    Narrative:  Pre-op Diagnosis: Screening [Z13.9]   Procedure(s):   COLONOSCOPY   Number of specimens: 1   Name of specimens:   #1 Rectosigmoid Mass BX's   Specimen total (fresh, frozen, permanent):->1              ?   Assessment/Plan:       1. Adenocarcinoma of colon    2. Lymphocytic colitis          ?Adenocarcinoma of colon  Stage I (pT2, pN0, cM0) colon adenocarcinoma, status post laparoscopic low anterior resection on 10/08/2020.  Discussed with patient that there is no role for adjuvant systemic therapy.  Recent CT scan of chest abdomen and pelvis performed on 01/19/2022 showed no evidence of recurrent or distant disease.    Colonoscopy from 01/07/2022 showed lymphocytic colitis otherwise unremarkable.  Reviewed labs, no concerning cytopenia.  Return in 1 year.    Lymphocytic colitis  No evidence of flare.  Will however recommend follow-up with GI for close surveillance.       Adenocarcinoma of colon  -     CBC Auto Differential; Future; Expected date: 02/03/2022  -     Comprehensive Metabolic Panel; Future; Expected date: 02/03/2022    Lymphocytic colitis  -     CBC Auto Differential; Future; Expected date: 02/03/2022  -     Comprehensive Metabolic Panel; Future; Expected date: 02/03/2022  -     Ambulatory referral/consult to Gastroenterology; Future; Expected date: 02/10/2022      Follow-Up: Follow up in about 1 year (around 2/3/2023).    MONIKA VAUGHN Md., Ph.D  Hematology & Oncology Department  Phone #: [phone]

## 2022-02-09 ENCOUNTER — TELEPHONE (OUTPATIENT)
Dept: RADIOLOGY | Facility: HOSPITAL | Age: 57
End: 2022-02-09
Payer: COMMERCIAL

## 2022-02-09 ENCOUNTER — OFFICE VISIT (OUTPATIENT)
Dept: INTERNAL MEDICINE | Facility: CLINIC | Age: 57
End: 2022-02-09
Payer: COMMERCIAL

## 2022-02-09 VITALS
SYSTOLIC BLOOD PRESSURE: 130 MMHG | TEMPERATURE: 98 F | WEIGHT: 171.06 LBS | DIASTOLIC BLOOD PRESSURE: 70 MMHG | HEART RATE: 74 BPM | BODY MASS INDEX: 29.37 KG/M2

## 2022-02-09 DIAGNOSIS — I77.3 FIBROMUSCULAR DYSPLASIA OF BILATERAL RENAL ARTERIES: ICD-10-CM

## 2022-02-09 DIAGNOSIS — N94.89 SIMPLE ADNEXAL CYST GREATER THAN 1 CM IN DIAMETER IN POSTMENOPAUSAL PATIENT: ICD-10-CM

## 2022-02-09 DIAGNOSIS — Z00.00 ANNUAL PHYSICAL EXAM: ICD-10-CM

## 2022-02-09 DIAGNOSIS — N95.8 SIMPLE ADNEXAL CYST GREATER THAN 1 CM IN DIAMETER IN POSTMENOPAUSAL PATIENT: ICD-10-CM

## 2022-02-09 DIAGNOSIS — I21.3 ST ELEVATION MYOCARDIAL INFARCTION (STEMI), UNSPECIFIED ARTERY: ICD-10-CM

## 2022-02-09 DIAGNOSIS — I25.42 SPONTANEOUS DISSECTION OF CORONARY ARTERY: Primary | ICD-10-CM

## 2022-02-09 PROCEDURE — 3075F SYST BP GE 130 - 139MM HG: CPT | Mod: CPTII,S$GLB,, | Performed by: FAMILY MEDICINE

## 2022-02-09 PROCEDURE — 1159F MED LIST DOCD IN RCRD: CPT | Mod: CPTII,S$GLB,, | Performed by: FAMILY MEDICINE

## 2022-02-09 PROCEDURE — 99999 PR PBB SHADOW E&M-EST. PATIENT-LVL V: ICD-10-PCS | Mod: PBBFAC,,, | Performed by: FAMILY MEDICINE

## 2022-02-09 PROCEDURE — 99214 PR OFFICE/OUTPT VISIT, EST, LEVL IV, 30-39 MIN: ICD-10-PCS | Mod: S$GLB,,, | Performed by: FAMILY MEDICINE

## 2022-02-09 PROCEDURE — 3078F DIAST BP <80 MM HG: CPT | Mod: CPTII,S$GLB,, | Performed by: FAMILY MEDICINE

## 2022-02-09 PROCEDURE — 3008F BODY MASS INDEX DOCD: CPT | Mod: CPTII,S$GLB,, | Performed by: FAMILY MEDICINE

## 2022-02-09 PROCEDURE — 99999 PR PBB SHADOW E&M-EST. PATIENT-LVL V: CPT | Mod: PBBFAC,,, | Performed by: FAMILY MEDICINE

## 2022-02-09 PROCEDURE — 1160F RVW MEDS BY RX/DR IN RCRD: CPT | Mod: CPTII,S$GLB,, | Performed by: FAMILY MEDICINE

## 2022-02-09 PROCEDURE — 3008F PR BODY MASS INDEX (BMI) DOCUMENTED: ICD-10-PCS | Mod: CPTII,S$GLB,, | Performed by: FAMILY MEDICINE

## 2022-02-09 PROCEDURE — 1159F PR MEDICATION LIST DOCUMENTED IN MEDICAL RECORD: ICD-10-PCS | Mod: CPTII,S$GLB,, | Performed by: FAMILY MEDICINE

## 2022-02-09 PROCEDURE — 99214 OFFICE O/P EST MOD 30 MIN: CPT | Mod: S$GLB,,, | Performed by: FAMILY MEDICINE

## 2022-02-09 PROCEDURE — 1160F PR REVIEW ALL MEDS BY PRESCRIBER/CLIN PHARMACIST DOCUMENTED: ICD-10-PCS | Mod: CPTII,S$GLB,, | Performed by: FAMILY MEDICINE

## 2022-02-09 PROCEDURE — 3075F PR MOST RECENT SYSTOLIC BLOOD PRESS GE 130-139MM HG: ICD-10-PCS | Mod: CPTII,S$GLB,, | Performed by: FAMILY MEDICINE

## 2022-02-09 PROCEDURE — 3078F PR MOST RECENT DIASTOLIC BLOOD PRESSURE < 80 MM HG: ICD-10-PCS | Mod: CPTII,S$GLB,, | Performed by: FAMILY MEDICINE

## 2022-02-09 RX ORDER — METOPROLOL TARTRATE 25 MG/1
25 TABLET, FILM COATED ORAL 2 TIMES DAILY
COMMUNITY
Start: 2022-01-22 | End: 2022-04-20 | Stop reason: SDUPTHER

## 2022-02-09 RX ORDER — ATORVASTATIN CALCIUM 20 MG/1
40 TABLET, FILM COATED ORAL NIGHTLY
COMMUNITY
End: 2022-04-20

## 2022-02-09 NOTE — PROGRESS NOTES
Subjective:      Patient ID: Jhoana Sierra is a 57 y.o. female.    Chief Complaint: Hospital Follow Up    HPI 57 y.o.   female patient with a PMHx of adenocarcinoma of colon presents to clinic for Follow up. The patient was last seen on June 11, 2021     Today she reports that she is doing okay. Patient is currently recovering from recent hospitalization.   Jan 24, 2022 has spontaneous left coronary artery dissection.  Was taken to [facility].  Dr. Kennedy took her back for left heart cath and found the dissection.  He also noted ascending aortic aneurysm//however this was not seen on CTA imaging.  Pt was found to have some beaded changes of renal arteries as well.  Work up negative for autoimmune source.  She is on BB bid, statin and ASA now.  Feeling a bit tired from this.  Also noted, L adexal cyst//that seems to have grown some.  Next visit with her gyn June.  Recommend US.  She denies current chest pain, SOB, and bowel changes.    Past Medical History:   Diagnosis Date    Adenocarcinoma of colon     PONV (postoperative nausea and vomiting)      Family History   Problem Relation Age of Onset    Fibromyalgia Sister     Diabetes Neg Hx     Heart disease Neg Hx      Past Surgical History:   Procedure Laterality Date    COLONOSCOPY N/A 8/25/2020    Procedure: COLONOSCOPY;  Surgeon: Rianna Dillon MD;  Location: [facility];  Service: Endoscopy;  Laterality: N/A;    COLONOSCOPY N/A 1/7/2022    Procedure: COLONOSCOPY;  Surgeon: Ziggy Hannah MD;  Location: [facility] END[facility];  Service: General;  Laterality: N/A;    FLEXIBLE SIGMOIDOSCOPY N/A 10/8/2020    Procedure: SIGMOIDOSCOPY, FLEXIBLE;  Surgeon: Ziggy Hannah MD;  Location: [facility] OR;  Service: General;  Laterality: N/A;    INJECTION OF ANESTHETIC AGENT INTO TISSUE PLANE DEFINED BY TRANSVERSUS ABDOMINIS MUSCLE N/A 10/8/2020    Procedure: BLOCK, TRANSVERSUS ABDOMINIS PLANE;  Surgeon: Ziggy Hannah MD;  Location: [facility] OR;  Service: General;  Laterality: N/A;     LAPAROSCOPIC SIGMOIDECTOMY N/A 10/8/2020    Procedure: COLECTOMY, SIGMOID, LAPAROSCOPIC;  Surgeon: Ziggy Hannah MD;  Location: [facility] OR;  Service: General;  Laterality: N/A;  lower anterior resection  Proctosigmoidectomy    LAPAROTOMY N/A 10/8/2020    Procedure: LAPAROTOMY;  Surgeon: Ziggy Hannah MD;  Location: [facility] OR;  Service: General;  Laterality: N/A;    OVARIAN CYST REMOVAL      WISDOM TOOTH EXTRACTION       Social History     Tobacco Use    Smoking status: Never Smoker    Smokeless tobacco: Never Used   Substance Use Topics    Alcohol use: Yes     Comment: Occ use; HOLD 72HRS PRIOR TO SURGERY    Drug use: No       /70   Pulse 74   Temp 98.2 °F (36.8 °C)   Wt 77.6 kg (171 lb 1.2 oz)   LMP 08/25/2017   BMI 29.37 kg/m²     Review of Systems   Constitutional: Positive for fatigue. Negative for activity change, appetite change, chills, diaphoresis, fever and unexpected weight change.   HENT: Negative for hearing loss, rhinorrhea, tinnitus and trouble swallowing.    Eyes: Negative for discharge and visual disturbance.   Respiratory: Negative for cough, chest tightness, shortness of breath and wheezing.    Cardiovascular: Negative for chest pain, palpitations and leg swelling.   Gastrointestinal: Negative for abdominal distention, abdominal pain, blood in stool, constipation, diarrhea and vomiting.   Endocrine: Negative for polydipsia and polyuria.   Genitourinary: Negative for difficulty urinating, dysuria, frequency, hematuria, menstrual problem and urgency.   Musculoskeletal: Negative for arthralgias, back pain, joint swelling and neck pain.   Skin: Negative for color change and rash.   Neurological: Negative for weakness and headaches.   Hematological: Negative for adenopathy.   Psychiatric/Behavioral: Negative for confusion, decreased concentration and dysphoric mood.       Objective:     Physical Exam  Vitals and nursing note reviewed.   Constitutional:       General: She is  not in acute distress.  HENT:      Right Ear: External ear normal.      Left Ear: External ear normal.      Nose: Nose normal.   Eyes:      Conjunctiva/sclera: Conjunctivae normal.      Pupils: Pupils are equal, round, and reactive to light.   Cardiovascular:      Rate and Rhythm: Normal rate and regular rhythm.      Heart sounds: Normal heart sounds.   Pulmonary:      Effort: Pulmonary effort is normal. No respiratory distress.      Breath sounds: Normal breath sounds. No wheezing or rales.   Abdominal:      General: Bowel sounds are normal. There is no distension.      Palpations: Abdomen is soft.      Tenderness: There is no abdominal tenderness. There is no guarding.   Musculoskeletal:      Cervical back: Normal range of motion and neck supple.      Right lower leg: No edema.      Left lower leg: No edema.   Skin:     General: Skin is warm and dry.      Findings: No rash.   Neurological:      Mental Status: She is alert and oriented to person, place, and time.   Psychiatric:         Behavior: Behavior normal.         Thought Content: Thought content normal.         Judgment: Judgment normal.         Lab Results   Component Value Date    WBC 3.66 (L) 06/17/2021    HGB 13.1 06/17/2021    HCT 40.6 06/17/2021     06/17/2021    CHOL 224 (H) 06/17/2021    TRIG 132 06/17/2021    HDL 61 06/17/2021    ALT 19 06/17/2021    AST 18 06/17/2021     06/17/2021    K 4.1 06/17/2021     06/17/2021    CREATININE 0.8 06/17/2021    BUN 17 06/17/2021    CO2 25 06/17/2021    TSH 2.676 06/17/2021    HGBA1C 5.5 06/17/2021       Assessment:     1. Spontaneous dissection of coronary artery    2. ST elevation myocardial infarction (STEMI), unspecified artery    3. Fibromuscular dysplasia of bilateral renal arteries    4. Simple adnexal cyst greater than 1 cm in diameter in postmenopausal patient    5. Annual physical exam         Plan:     Spontaneous dissection of coronary artery  -     Ambulatory referral/consult to  Cardiology; Future; Expected date: 02/16/2022    ST elevation myocardial infarction (STEMI), unspecified artery    Fibromuscular dysplasia of bilateral renal arteries  Comments:  Beaded appearance of renal arteries noted on CT done at [facility] Jan 2022.  Orders:  -     US Retroperitoneal Complete; Future; Expected date: 08/09/2022    Simple adnexal cyst greater than 1 cm in diameter in postmenopausal patient  -     US Pelvis Complete Non OB; Future; Expected date: 02/09/2022    Annual physical exam  -     CBC Auto Differential; Future; Expected date: 06/09/2022  -     Comprehensive Metabolic Panel; Future; Expected date: 06/09/2022  -     Hemoglobin A1C; Future; Expected date: 06/09/2022  -     Lipid Panel; Future; Expected date: 06/09/2022  -     TSH; Future; Expected date: 06/09/2022      Vitals reviewed and stable. BP within normal range at 130/70. Patient up-to-date on colonoscopy. She received on 1/7/2022.     Reviewed records//pt has copy with her to take to [facility].  Not sure about the ascending aneurysm//noted on L cath but imaging is negative.  Will defer to Cards.  Will plan to do renal US in 6 mos//monitor for possible fibromuscular dysplasia of renal arteries.  US of pelvis ordered.  Cont current meds  Questions and concerns addressed.      Follow up in 4 months//labs      Documentation entered by Dani Frausto, acting as scribe for Dr. Dhruv Mandel. 02/09/2022 7:25 AM.

## 2022-02-10 ENCOUNTER — HOSPITAL ENCOUNTER (OUTPATIENT)
Dept: RADIOLOGY | Facility: HOSPITAL | Age: 57
Discharge: HOME | End: 2022-02-10
Attending: FAMILY MEDICINE
Payer: COMMERCIAL

## 2022-02-10 DIAGNOSIS — N94.89 SIMPLE ADNEXAL CYST GREATER THAN 1 CM IN DIAMETER IN POSTMENOPAUSAL PATIENT: ICD-10-CM

## 2022-02-10 DIAGNOSIS — N95.8 SIMPLE ADNEXAL CYST GREATER THAN 1 CM IN DIAMETER IN POSTMENOPAUSAL PATIENT: ICD-10-CM

## 2022-02-10 PROCEDURE — 76830 TRANSVAGINAL US NON-OB: CPT | Mod: 26,,, | Performed by: RADIOLOGY

## 2022-02-10 PROCEDURE — 76856 US EXAM PELVIC COMPLETE: CPT | Mod: 26,,, | Performed by: RADIOLOGY

## 2022-02-10 PROCEDURE — 76830 TRANSVAGINAL US NON-OB: CPT | Mod: TC

## 2022-02-10 PROCEDURE — 76830 US PELVIS COMP WITH TRANSVAG NON-OB (XPD): ICD-10-PCS | Mod: 26,,, | Performed by: RADIOLOGY

## 2022-02-10 PROCEDURE — 76856 US PELVIS COMP WITH TRANSVAG NON-OB (XPD): ICD-10-PCS | Mod: 26,,, | Performed by: RADIOLOGY

## 2022-02-11 DIAGNOSIS — I25.42 SPONTANEOUS DISSECTION OF CORONARY ARTERY: Primary | ICD-10-CM

## 2022-02-14 ENCOUNTER — HOSPITAL ENCOUNTER (OUTPATIENT)
Dept: CARDIOLOGY | Facility: HOSPITAL | Age: 57
Discharge: HOME | End: 2022-02-14
Attending: INTERNAL MEDICINE
Payer: COMMERCIAL

## 2022-02-14 ENCOUNTER — OFFICE VISIT (OUTPATIENT)
Dept: CARDIOLOGY | Facility: CLINIC | Age: 57
End: 2022-02-14
Payer: COMMERCIAL

## 2022-02-14 VITALS
OXYGEN SATURATION: 98 % | HEART RATE: 73 BPM | DIASTOLIC BLOOD PRESSURE: 68 MMHG | WEIGHT: 172.63 LBS | HEIGHT: 64 IN | BODY MASS INDEX: 29.47 KG/M2 | SYSTOLIC BLOOD PRESSURE: 122 MMHG

## 2022-02-14 DIAGNOSIS — I25.42 SPONTANEOUS DISSECTION OF CORONARY ARTERY: ICD-10-CM

## 2022-02-14 DIAGNOSIS — I21.02 ST ELEVATION MYOCARDIAL INFARCTION INVOLVING LEFT ANTERIOR DESCENDING (LAD) CORONARY ARTERY: ICD-10-CM

## 2022-02-14 DIAGNOSIS — I77.3 FIBROMUSCULAR DYSPLASIA: ICD-10-CM

## 2022-02-14 DIAGNOSIS — I25.42 SPONTANEOUS DISSECTION OF CORONARY ARTERY: Primary | ICD-10-CM

## 2022-02-14 PROCEDURE — 99203 OFFICE O/P NEW LOW 30 MIN: CPT | Mod: S$GLB,,, | Performed by: INTERNAL MEDICINE

## 2022-02-14 PROCEDURE — 99999 PR PBB SHADOW E&M-EST. PATIENT-LVL IV: ICD-10-PCS | Mod: PBBFAC,,, | Performed by: INTERNAL MEDICINE

## 2022-02-14 PROCEDURE — 93010 ELECTROCARDIOGRAM REPORT: CPT | Mod: ,,, | Performed by: STUDENT IN AN ORGANIZED HEALTH CARE EDUCATION/TRAINING PROGRAM

## 2022-02-14 PROCEDURE — 99999 PR PBB SHADOW E&M-EST. PATIENT-LVL IV: CPT | Mod: PBBFAC,,, | Performed by: INTERNAL MEDICINE

## 2022-02-14 PROCEDURE — 93010 EKG 12-LEAD: ICD-10-PCS | Mod: ,,, | Performed by: STUDENT IN AN ORGANIZED HEALTH CARE EDUCATION/TRAINING PROGRAM

## 2022-02-14 PROCEDURE — 93005 ELECTROCARDIOGRAM TRACING: CPT

## 2022-02-14 PROCEDURE — 99203 PR OFFICE/OUTPT VISIT, NEW, LEVL III, 30-44 MIN: ICD-10-PCS | Mod: S$GLB,,, | Performed by: INTERNAL MEDICINE

## 2022-02-14 NOTE — PROGRESS NOTES
Subjective:   Patient ID:  Jhoana Sierra is a 57 y.o. female who presents for evaluation of No chief complaint on file.      56 yo female, came in for post STEMI/ coronary dissection f/u  PMH h/o Colon cancer emoval parts in  no chemo and XRT  and in remission, fibromyalgia  01/24/2022 admitted to [facility] for STEMI. Emergent cath showed spontaneous mild to distal LAD dissection, normal EF and filling pressure. ascending TAA 4 cm.  Chest CTA showed no PE and TAA  Brain CT negative.   ABD CTA showed slight beaded appurtenance of shelbi l A. Typical for fibromuscular dysplasia  Vascularis workup negative for NIRANJAN CRP KHANG and ANCA.   D/c with ASA lipitor and metoprolol  Now no chest pain.       Past Medical History:   Diagnosis Date    Adenocarcinoma of colon     PONV (postoperative nausea and vomiting)        Past Surgical History:   Procedure Laterality Date    COLONOSCOPY N/A 8/25/2020    Procedure: COLONOSCOPY;  Surgeon: Rianna Dillon MD;  Location: [facility];  Service: Endoscopy;  Laterality: N/A;    COLONOSCOPY N/A 1/7/2022    Procedure: COLONOSCOPY;  Surgeon: Ziggy Hannah MD;  Location: [facility];  Service: General;  Laterality: N/A;    FLEXIBLE SIGMOIDOSCOPY N/A 10/8/2020    Procedure: SIGMOIDOSCOPY, FLEXIBLE;  Surgeon: Ziggy Hannah MD;  Location: [facility] OR;  Service: General;  Laterality: N/A;    INJECTION OF ANESTHETIC AGENT INTO TISSUE PLANE DEFINED BY TRANSVERSUS ABDOMINIS MUSCLE N/A 10/8/2020    Procedure: BLOCK, TRANSVERSUS ABDOMINIS PLANE;  Surgeon: Ziggy Hannah MD;  Location: [facility] OR;  Service: General;  Laterality: N/A;    LAPAROSCOPIC SIGMOIDECTOMY N/A 10/8/2020    Procedure: COLECTOMY, SIGMOID, LAPAROSCOPIC;  Surgeon: Ziggy Hannah MD;  Location: [facility] OR;  Service: General;  Laterality: N/A;  lower anterior resection  Proctosigmoidectomy    LAPAROTOMY N/A 10/8/2020    Procedure: LAPAROTOMY;  Surgeon: Ziggy Hannah MD;  Location: [facility] OR;  Service: General;   Laterality: N/A;    OVARIAN CYST REMOVAL      WISDOM TOOTH EXTRACTION         Social History     Tobacco Use    Smoking status: Never Smoker    Smokeless tobacco: Never Used   Substance Use Topics    Alcohol use: Yes     Comment: Occ use; HOLD 72HRS PRIOR TO SURGERY    Drug use: No       Family History   Problem Relation Age of Onset    Fibromyalgia Sister     Diabetes Neg Hx     Heart disease Neg Hx        Review of Systems   Constitutional: Negative for decreased appetite, diaphoresis, fever, malaise/fatigue and night sweats.   HENT: Negative for nosebleeds.    Eyes: Negative for blurred vision and double vision.   Cardiovascular: Negative for chest pain, claudication, dyspnea on exertion, irregular heartbeat, leg swelling, near-syncope, orthopnea, palpitations, paroxysmal nocturnal dyspnea and syncope.   Respiratory: Negative for cough, shortness of breath, sleep disturbances due to breathing, snoring, sputum production and wheezing.    Endocrine: Negative for cold intolerance and polyuria.   Hematologic/Lymphatic: Does not bruise/bleed easily.   Skin: Negative for rash.   Musculoskeletal: Negative for back pain, falls, joint pain, joint swelling and neck pain.   Gastrointestinal: Negative for abdominal pain, heartburn, nausea and vomiting.   Genitourinary: Negative for dysuria, frequency and hematuria.   Neurological: Negative for difficulty with concentration, dizziness, focal weakness, headaches, light-headedness, numbness, seizures and weakness.   Psychiatric/Behavioral: Negative for depression, memory loss and substance abuse. The patient does not have insomnia.    Allergic/Immunologic: Negative for HIV exposure and hives.       Objective:   Physical Exam  HENT:      Head: Normocephalic.   Eyes:      Pupils: Pupils are equal, round, and reactive to light.   Neck:      Thyroid: No thyromegaly.      Vascular: Normal carotid pulses. No carotid bruit or JVD.   Cardiovascular:      Rate and Rhythm:  Normal rate and regular rhythm.  No extrasystoles are present.     Chest Wall: PMI is not displaced.      Pulses: Normal pulses.      Heart sounds: Normal heart sounds. No murmur heard.    No gallop. No S3 sounds.   Pulmonary:      Effort: No respiratory distress.      Breath sounds: Normal breath sounds. No stridor.   Abdominal:      General: Bowel sounds are normal.      Palpations: Abdomen is soft.      Tenderness: There is no abdominal tenderness. There is no rebound.   Musculoskeletal:         General: Normal range of motion.   Skin:     Findings: No rash.   Neurological:      Mental Status: She is alert and oriented to person, place, and time.   Psychiatric:         Behavior: Behavior normal.         Lab Results   Component Value Date    CHOL 224 (H) 06/17/2021    CHOL 206 (H) 06/10/2020    CHOL 195 06/11/2019     Lab Results   Component Value Date    HDL 61 06/17/2021    HDL 75 06/10/2020    HDL 71 06/11/2019     Lab Results   Component Value Date    LDLCALC 136.6 06/17/2021    LDLCALC 113.4 06/10/2020    LDLCALC 105.8 06/11/2019     Lab Results   Component Value Date    TRIG 132 06/17/2021    TRIG 88 06/10/2020    TRIG 91 06/11/2019     Lab Results   Component Value Date    CHOLHDL 27.2 06/17/2021    CHOLHDL 36.4 06/10/2020    CHOLHDL 36.4 06/11/2019       Chemistry        Component Value Date/Time     06/17/2021 0954    K 4.1 06/17/2021 0954     06/17/2021 0954    CO2 25 06/17/2021 0954    BUN 17 06/17/2021 0954    CREATININE 0.8 06/17/2021 0954    GLU 91 06/17/2021 0954        Component Value Date/Time    CALCIUM 9.8 06/17/2021 0954    ALKPHOS 81 06/17/2021 0954    AST 18 06/17/2021 0954    ALT 19 06/17/2021 0954    BILITOT 0.4 06/17/2021 0954    ESTGFRAFRICA >60.0 06/17/2021 0954    EGFRNONAA >60.0 06/17/2021 0954          Lab Results   Component Value Date    HGBA1C 5.5 06/17/2021     Lab Results   Component Value Date    TSH 2.676 06/17/2021     No results found for: INR, PROTIME  Lab  Results   Component Value Date    WBC 3.66 (L) 06/17/2021    HGB 13.1 06/17/2021    HCT 40.6 06/17/2021    MCV 94 06/17/2021     06/17/2021     BNP  @LABRCNTIP(BNP,BNPTRIAGEBLO)@  CrCl cannot be calculated (Patient's most recent lab result is older than the maximum 7 days allowed.).  No results found in the last 24 hours.  No results found in the last 24 hours.  No results found in the last 24 hours.    Assessment:      1. Spontaneous dissection of coronary artery    2. ST elevation myocardial infarction involving left anterior descending (LAD) coronary artery    3. Fibromuscular dysplasia        Plan:   Contiune ASA Lipitor and metoprolol  Obtain CATH imaging from [facility]    Counseled DASH  Check Lipid profile in 6 months  Recommend heart-healthy diet, weight control and regular exercise.  Jennifer. Risk modification.   I have reviewed all pertinent labs and cardiac studies independently. Plans and recommendations have been formulated under my direct supervision. All questions answered and patient voiced understanding.   If symptoms persist go to the ED  RTC in 6 months

## 2022-02-22 PROBLEM — I77.3 FIBROMUSCULAR DYSPLASIA: Status: ACTIVE | Noted: 2022-02-22

## 2022-03-08 ENCOUNTER — HOSPITAL ENCOUNTER (OUTPATIENT)
Dept: RADIOLOGY | Facility: HOSPITAL | Age: 57
Discharge: HOME | End: 2022-03-08
Attending: NURSE PRACTITIONER
Payer: COMMERCIAL

## 2022-03-08 ENCOUNTER — OFFICE VISIT (OUTPATIENT)
Dept: GASTROENTEROLOGY | Facility: CLINIC | Age: 57
End: 2022-03-08
Payer: COMMERCIAL

## 2022-03-08 ENCOUNTER — PATIENT OUTREACH (OUTPATIENT)
Dept: ADMINISTRATIVE | Facility: OTHER | Age: 57
End: 2022-03-08
Payer: COMMERCIAL

## 2022-03-08 VITALS
BODY MASS INDEX: 29.99 KG/M2 | WEIGHT: 175.69 LBS | HEIGHT: 64 IN | SYSTOLIC BLOOD PRESSURE: 118 MMHG | DIASTOLIC BLOOD PRESSURE: 72 MMHG | HEART RATE: 66 BPM

## 2022-03-08 DIAGNOSIS — R19.8 ABNORMAL BOWEL HABITS: ICD-10-CM

## 2022-03-08 DIAGNOSIS — R19.8 ABNORMAL BOWEL HABITS: Primary | ICD-10-CM

## 2022-03-08 DIAGNOSIS — K52.832 LYMPHOCYTIC COLITIS: ICD-10-CM

## 2022-03-08 PROCEDURE — 1159F PR MEDICATION LIST DOCUMENTED IN MEDICAL RECORD: ICD-10-PCS | Mod: CPTII,S$GLB,, | Performed by: NURSE PRACTITIONER

## 2022-03-08 PROCEDURE — 74019 RADEX ABDOMEN 2 VIEWS: CPT | Mod: 26,,, | Performed by: RADIOLOGY

## 2022-03-08 PROCEDURE — 74019 XR ABDOMEN FLAT AND ERECT: ICD-10-PCS | Mod: 26,,, | Performed by: RADIOLOGY

## 2022-03-08 PROCEDURE — 99999 PR PBB SHADOW E&M-EST. PATIENT-LVL IV: ICD-10-PCS | Mod: PBBFAC,,, | Performed by: NURSE PRACTITIONER

## 2022-03-08 PROCEDURE — 99214 OFFICE O/P EST MOD 30 MIN: CPT | Mod: S$GLB,,, | Performed by: NURSE PRACTITIONER

## 2022-03-08 PROCEDURE — 99999 PR PBB SHADOW E&M-EST. PATIENT-LVL IV: CPT | Mod: PBBFAC,,, | Performed by: NURSE PRACTITIONER

## 2022-03-08 PROCEDURE — 99214 PR OFFICE/OUTPT VISIT, EST, LEVL IV, 30-39 MIN: ICD-10-PCS | Mod: S$GLB,,, | Performed by: NURSE PRACTITIONER

## 2022-03-08 PROCEDURE — 3078F PR MOST RECENT DIASTOLIC BLOOD PRESSURE < 80 MM HG: ICD-10-PCS | Mod: CPTII,S$GLB,, | Performed by: NURSE PRACTITIONER

## 2022-03-08 PROCEDURE — 3078F DIAST BP <80 MM HG: CPT | Mod: CPTII,S$GLB,, | Performed by: NURSE PRACTITIONER

## 2022-03-08 PROCEDURE — 3008F PR BODY MASS INDEX (BMI) DOCUMENTED: ICD-10-PCS | Mod: CPTII,S$GLB,, | Performed by: NURSE PRACTITIONER

## 2022-03-08 PROCEDURE — 74019 RADEX ABDOMEN 2 VIEWS: CPT | Mod: TC

## 2022-03-08 PROCEDURE — 3074F SYST BP LT 130 MM HG: CPT | Mod: CPTII,S$GLB,, | Performed by: NURSE PRACTITIONER

## 2022-03-08 PROCEDURE — 3008F BODY MASS INDEX DOCD: CPT | Mod: CPTII,S$GLB,, | Performed by: NURSE PRACTITIONER

## 2022-03-08 PROCEDURE — 1160F PR REVIEW ALL MEDS BY PRESCRIBER/CLIN PHARMACIST DOCUMENTED: ICD-10-PCS | Mod: CPTII,S$GLB,, | Performed by: NURSE PRACTITIONER

## 2022-03-08 PROCEDURE — 1159F MED LIST DOCD IN RCRD: CPT | Mod: CPTII,S$GLB,, | Performed by: NURSE PRACTITIONER

## 2022-03-08 PROCEDURE — 3074F PR MOST RECENT SYSTOLIC BLOOD PRESSURE < 130 MM HG: ICD-10-PCS | Mod: CPTII,S$GLB,, | Performed by: NURSE PRACTITIONER

## 2022-03-08 PROCEDURE — 1160F RVW MEDS BY RX/DR IN RCRD: CPT | Mod: CPTII,S$GLB,, | Performed by: NURSE PRACTITIONER

## 2022-03-08 RX ORDER — ASPIRIN 325 MG
100 TABLET, DELAYED RELEASE (ENTERIC COATED) ORAL DAILY
COMMUNITY
End: 2022-10-17

## 2022-03-08 NOTE — PROGRESS NOTES
Clinic Consult:  [facility] Consultation Note    Reason for Consult:  The primary encounter diagnosis was Abnormal bowel habits. A diagnosis of Lymphocytic colitis was also pertinent to this visit.    PCP: Dhruv Nguyen   [id number] [address] [facility] / [place] [id number]    HPI:  This is a 57 y.o. female here for evaluation of the above.   She has a history of adenocarcinoma of colon. She is s/p resection with anastomosis. This was found in August 2020. She had repeat colonoscopy in January 2022 with end to side colorectal anastomosis with healthy mucosa. One polyp that was removed. Also with non-bleeding internal hemorrhoids. Recall 3 years. The pathology of the polypectomy showed lymphocytic colitis. She denies any diarrhea or abdominal pain. She does reports small thin stools and may have multiple in a day. This change in bowel habits started after surgery.     Review of Systems   Constitutional: Negative for fever, malaise/fatigue and weight loss.   HENT: Negative for sore throat.    Respiratory: Negative for cough and wheezing.    Cardiovascular: Negative for chest pain and palpitations.   Gastrointestinal: Negative for abdominal pain, blood in stool, constipation, diarrhea, heartburn, melena, nausea and vomiting.   Genitourinary: Negative for dysuria and frequency.   Musculoskeletal: Negative for back pain, joint pain, myalgias and neck pain.   Skin: Negative for itching and rash.   Neurological: Negative for dizziness, speech change, seizures, loss of consciousness and headaches.   Psychiatric/Behavioral: Negative for depression and substance abuse. The patient is not nervous/anxious.        Medical History:  has a past medical history of Adenocarcinoma of colon, Colon cancer (2020), Heart attack (01/22/2022), and PONV (postoperative nausea and vomiting).    Surgical History:  has a past surgical history that includes Ovarian cyst removal; Wells tooth extraction; Colonoscopy (N/A, 8/25/2020);  "Laparoscopic sigmoidectomy (N/A, 10/8/2020); Injection of anesthetic agent into tissue plane defined by transversus abdominis muscle (N/A, 10/8/2020); Flexible sigmoidoscopy (N/A, 10/8/2020); Laparotomy (N/A, 10/8/2020); and Colonoscopy (N/A, 1/7/2022).    Family History: family history includes Fibromyalgia in her sister..     Social History:  reports that she has never smoked. She has never used smokeless tobacco. She reports current alcohol use. She reports that she does not use drugs.    Allergies: Reviewed    Home Medications:   Current Outpatient Medications on File Prior to Visit   Medication Sig Dispense Refill    aspirin 81 mg Cap 81 MG  .      atorvastatin (LIPITOR) 20 MG tablet 40 MG  .      co-enzyme Q-10 50 mg capsule Take 100 mg by mouth once daily.      meloxicam (MOBIC) 15 MG tablet TAKE 1 TABLET BY MOUTH ONCE DAILY AS NEEDED FOR PAIN 90 tablet 0    metoprolol tartrate (LOPRESSOR) 25 MG tablet 25 MG  .      tiZANidine (ZANAFLEX) 4 MG tablet Take 1 tablet (4 mg total) by mouth 3 (three) times daily as needed (muscle spasm). 30 tablet 0     No current facility-administered medications on file prior to visit.       Physical Exam:  /72 (BP Location: Left arm, Patient Position: Sitting, BP Method: Medium (Manual))   Pulse 66   Ht 5' 4" (1.626 m)   Wt 79.7 kg (175 lb 11.3 oz)   LMP 08/25/2017   BMI 30.16 kg/m²   Body mass index is 30.16 kg/m².  Physical Exam  Constitutional:       General: She is not in acute distress.  HENT:      Head: Normocephalic and atraumatic.   Eyes:      General: No scleral icterus.     Conjunctiva/sclera: Conjunctivae normal.   Cardiovascular:      Rate and Rhythm: Normal rate and regular rhythm.      Heart sounds: No murmur heard.  Pulmonary:      Effort: Pulmonary effort is normal. No respiratory distress.      Breath sounds: Normal breath sounds. No wheezing.   Abdominal:      General: Abdomen is flat. Bowel sounds are normal.      Palpations: Abdomen is soft. "      Tenderness: There is no abdominal tenderness.   Skin:     General: Skin is warm and dry.   Neurological:      General: No focal deficit present.      Mental Status: She is alert and oriented to person, place, and time.      Cranial Nerves: No cranial nerve deficit.   Psychiatric:         Mood and Affect: Mood normal.         Judgment: Judgment normal.         Labs: Pertinent labs reviewed.  CRC Screening:     Assessment:  1. Abnormal bowel habits    2. Lymphocytic colitis        Recommendations:   - check xray today   - no need for treatment of lymphocytic colitis unless becomes symptomatic. Would treat with budesonide taper.     Abnormal bowel habits  -     X-Ray Abdomen Flat And Erect; Future; Expected date: 03/08/2022    Lymphocytic colitis  -     Ambulatory referral/consult to Gastroenterology        Follow up if symptoms worsen or fail to improve.    Thank you so much for allowing me to participate in the care of BIB Cortes

## 2022-04-11 ENCOUNTER — LAB VISIT (OUTPATIENT)
Dept: LAB | Facility: HOSPITAL | Age: 57
End: 2022-04-11
Attending: COLON & RECTAL SURGERY
Payer: COMMERCIAL

## 2022-04-11 DIAGNOSIS — C18.9 ADENOCARCINOMA OF COLON: ICD-10-CM

## 2022-04-11 DIAGNOSIS — Z85.038 PERSONAL HISTORY OF COLON CANCER: ICD-10-CM

## 2022-04-11 PROCEDURE — 36415 COLL VENOUS BLD VENIPUNCTURE: CPT | Mod: PO | Performed by: COLON & RECTAL SURGERY

## 2022-04-11 PROCEDURE — 82378 CARCINOEMBRYONIC ANTIGEN: CPT | Performed by: COLON & RECTAL SURGERY

## 2022-04-12 LAB — CEA SERPL-MCNC: 1.7 NG/ML (ref 0–5)

## 2022-04-18 ENCOUNTER — OFFICE VISIT (OUTPATIENT)
Dept: SURGERY | Facility: CLINIC | Age: 57
End: 2022-04-18
Payer: COMMERCIAL

## 2022-04-18 ENCOUNTER — TELEPHONE (OUTPATIENT)
Dept: SURGERY | Facility: CLINIC | Age: 57
End: 2022-04-18

## 2022-04-18 VITALS
TEMPERATURE: 99 F | SYSTOLIC BLOOD PRESSURE: 145 MMHG | HEART RATE: 57 BPM | DIASTOLIC BLOOD PRESSURE: 98 MMHG | WEIGHT: 180.13 LBS | BODY MASS INDEX: 30.92 KG/M2

## 2022-04-18 DIAGNOSIS — Z85.038 PERSONAL HISTORY OF COLON CANCER: Primary | ICD-10-CM

## 2022-04-18 DIAGNOSIS — C18.9 ADENOCARCINOMA OF COLON: ICD-10-CM

## 2022-04-18 DIAGNOSIS — C18.9 COLON ADENOCARCINOMA: ICD-10-CM

## 2022-04-18 DIAGNOSIS — N83.209 CYST OF OVARY, UNSPECIFIED LATERALITY: ICD-10-CM

## 2022-04-18 PROCEDURE — 99999 PR PBB SHADOW E&M-EST. PATIENT-LVL III: ICD-10-PCS | Mod: PBBFAC,,, | Performed by: COLON & RECTAL SURGERY

## 2022-04-18 PROCEDURE — 3077F PR MOST RECENT SYSTOLIC BLOOD PRESSURE >= 140 MM HG: ICD-10-PCS | Mod: CPTII,S$GLB,, | Performed by: COLON & RECTAL SURGERY

## 2022-04-18 PROCEDURE — 3008F BODY MASS INDEX DOCD: CPT | Mod: CPTII,S$GLB,, | Performed by: COLON & RECTAL SURGERY

## 2022-04-18 PROCEDURE — 3080F DIAST BP >= 90 MM HG: CPT | Mod: CPTII,S$GLB,, | Performed by: COLON & RECTAL SURGERY

## 2022-04-18 PROCEDURE — 99214 OFFICE O/P EST MOD 30 MIN: CPT | Mod: S$GLB,,, | Performed by: COLON & RECTAL SURGERY

## 2022-04-18 PROCEDURE — 99999 PR PBB SHADOW E&M-EST. PATIENT-LVL III: CPT | Mod: PBBFAC,,, | Performed by: COLON & RECTAL SURGERY

## 2022-04-18 PROCEDURE — 99214 PR OFFICE/OUTPT VISIT, EST, LEVL IV, 30-39 MIN: ICD-10-PCS | Mod: S$GLB,,, | Performed by: COLON & RECTAL SURGERY

## 2022-04-18 PROCEDURE — 1159F PR MEDICATION LIST DOCUMENTED IN MEDICAL RECORD: ICD-10-PCS | Mod: CPTII,S$GLB,, | Performed by: COLON & RECTAL SURGERY

## 2022-04-18 PROCEDURE — 1159F MED LIST DOCD IN RCRD: CPT | Mod: CPTII,S$GLB,, | Performed by: COLON & RECTAL SURGERY

## 2022-04-18 PROCEDURE — 3077F SYST BP >= 140 MM HG: CPT | Mod: CPTII,S$GLB,, | Performed by: COLON & RECTAL SURGERY

## 2022-04-18 PROCEDURE — 3008F PR BODY MASS INDEX (BMI) DOCUMENTED: ICD-10-PCS | Mod: CPTII,S$GLB,, | Performed by: COLON & RECTAL SURGERY

## 2022-04-18 PROCEDURE — 3080F PR MOST RECENT DIASTOLIC BLOOD PRESSURE >= 90 MM HG: ICD-10-PCS | Mod: CPTII,S$GLB,, | Performed by: COLON & RECTAL SURGERY

## 2022-04-18 NOTE — PROGRESS NOTES
History & Physical    SUBJECTIVE:     CC: rectosigmoid adenocarcinoma  Ref MD: Rianna Dillon MD    History of Present Illness:  Patient is a 57 y.o. 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  Patient had a positive fit test in June 2020 which prompted the scheduling of a colonoscopy.  This colonoscopy was performed in August 2020 and showed a mass in the rectosigmoid region with biopsies resulting in adenocarcinoma.  Patient states that she had seen intermittent blood in her stool for 3 months prior to this.  This was her 1st colonoscopy ever.  She denies any associated fever, chills, nausea, vomiting, diarrhea, constipation, weight loss or night sweats.  She has no known personal family history of colorectal cancer or colonic polyps previously.  Her only significant abdominal surgical history is an open ovarian cyst removal via Pfannenstiel incision.    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  she is also being worked up for a ovarian cyst and may require surgical intervention.  She had a recent CEA level drawn this month showing normal at 1.7.  She also had a CT scan in January 2022 showing no evidence of recurrent or metastatic disease.  Is tolerating regular diet having normal bowel function.  Denies any hematochezia or melena.  Patient also had a colonoscopy which did not show any evidence of recurrent or additional disease.    Review of patient's allergies indicates:  No Known Allergies    Current Outpatient Medications   Medication Sig Dispense Refill    aspirin 81 mg Cap 81 MG  .      atorvastatin (LIPITOR) 20 MG tablet 40 MG  .      co-enzyme Q-10 50 mg capsule Take 100 mg by mouth once daily.      meloxicam (MOBIC) 15 MG tablet TAKE 1 TABLET BY MOUTH ONCE DAILY AS NEEDED FOR PAIN 90 tablet 0    metoprolol tartrate (LOPRESSOR) 25 MG tablet 25 MG  .       tiZANidine (ZANAFLEX) 4 MG tablet Take 1 tablet (4 mg total) by mouth 3 (three) times daily as needed (muscle spasm). 30 tablet 0     No current facility-administered medications for this visit.       Past Medical History:   Diagnosis Date    Adenocarcinoma of colon     Colon cancer 2020    Heart attack 01/22/2022    PONV (postoperative nausea and vomiting)      Past Surgical History:   Procedure Laterality Date    COLONOSCOPY N/A 8/25/2020    Procedure: COLONOSCOPY;  Surgeon: Rianna Dillon MD;  Location: [facility] ENDO;  Service: Endoscopy;  Laterality: N/A;    COLONOSCOPY N/A 1/7/2022    Procedure: COLONOSCOPY;  Surgeon: Ziggy Hannah MD;  Location: [facility] ENDO;  Service: General;  Laterality: N/A;    FLEXIBLE SIGMOIDOSCOPY N/A 10/8/2020    Procedure: SIGMOIDOSCOPY, FLEXIBLE;  Surgeon: Ziggy Hannah MD;  Location: [facility] OR;  Service: General;  Laterality: N/A;    INJECTION OF ANESTHETIC AGENT INTO TISSUE PLANE DEFINED BY TRANSVERSUS ABDOMINIS MUSCLE N/A 10/8/2020    Procedure: BLOCK, TRANSVERSUS ABDOMINIS PLANE;  Surgeon: Ziggy Hannah MD;  Location: [facility] OR;  Service: General;  Laterality: N/A;    LAPAROSCOPIC SIGMOIDECTOMY N/A 10/8/2020    Procedure: COLECTOMY, SIGMOID, LAPAROSCOPIC;  Surgeon: Ziggy Hannah MD;  Location: [facility] OR;  Service: General;  Laterality: N/A;  lower anterior resection  Proctosigmoidectomy    LAPAROTOMY N/A 10/8/2020    Procedure: LAPAROTOMY;  Surgeon: Ziggy Hannah MD;  Location: [facility] OR;  Service: General;  Laterality: N/A;    OVARIAN CYST REMOVAL      WISDOM TOOTH EXTRACTION       Family History   Problem Relation Age of Onset    Fibromyalgia Sister     Diabetes Neg Hx     Heart disease Neg Hx      Social History     Tobacco Use    Smoking status: Never Smoker    Smokeless tobacco: Never Used   Substance Use Topics    Alcohol use: Yes     Comment: Occ use; HOLD 72HRS PRIOR TO SURGERY    Drug use: No        Review of Systems:  Review of  Systems   Constitutional: Negative for activity change, appetite change, chills, fatigue, fever and unexpected weight change.   HENT: Negative for congestion, ear pain, sore throat and trouble swallowing.    Eyes: Negative for pain, redness and itching.   Respiratory: Negative for cough, shortness of breath and wheezing.    Cardiovascular: Negative for chest pain, palpitations and leg swelling.   Gastrointestinal: Negative for abdominal distention, abdominal pain, blood in stool, constipation, diarrhea, nausea, rectal pain and vomiting.   Endocrine: Negative for cold intolerance, heat intolerance and polyuria.   Genitourinary: Negative for dysuria, flank pain, frequency and hematuria.   Musculoskeletal: Negative for gait problem, joint swelling and neck pain.   Skin: Negative for color change, rash and wound.   Allergic/Immunologic: Negative for environmental allergies and immunocompromised state.   Neurological: Negative for dizziness, speech difficulty, weakness and numbness.   Psychiatric/Behavioral: Negative for agitation, confusion and hallucinations.       OBJECTIVE:     Vital Signs (Most Recent)  Temp: 98.6 °F (37 °C) (04/18/22 1342)  Pulse: (!) 57 (04/18/22 1342)  BP: (!) 145/98 (04/18/22 1342)     81.7 kg (180 lb 1.9 oz)     Physical Exam:  Physical Exam  Constitutional:       Appearance: She is well-developed.   HENT:      Head: Normocephalic and atraumatic.   Eyes:      Conjunctiva/sclera: Conjunctivae normal.   Neck:      Thyroid: No thyromegaly.   Cardiovascular:      Rate and Rhythm: Normal rate and regular rhythm.   Pulmonary:      Effort: Pulmonary effort is normal. No respiratory distress.   Abdominal:      General: There is no distension.      Palpations: Abdomen is soft. There is no mass.      Tenderness: There is no abdominal tenderness.      Comments: Well-healed incisions without erythema or drainage   Musculoskeletal:         General: No tenderness. Normal range of motion.      Cervical back:  Normal range of motion.   Skin:     General: Skin is warm and dry.      Capillary Refill: Capillary refill takes less than 2 seconds.      Findings: No rash.   Neurological:      Mental Status: She is alert and oriented to person, place, and time.         Laboratory  Lab Results   Component Value Date    WBC 3.66 (L) 06/17/2021    HGB 13.1 06/17/2021    HCT 40.6 06/17/2021     06/17/2021    CHOL 224 (H) 06/17/2021    TRIG 132 06/17/2021    HDL 61 06/17/2021    ALT 19 06/17/2021    AST 18 06/17/2021     06/17/2021    K 4.1 06/17/2021     06/17/2021    CREATININE 0.8 06/17/2021    BUN 17 06/17/2021    CO2 25 06/17/2021    TSH 2.676 06/17/2021    HGBA1C 5.5 06/17/2021     CEA: 1.7 (April 2022) <-- 2.1 (Oct 2021) <-- 1.6 (April 2021) <-- 2.7 (jan 2021) <-- 1.3 (Nov 2020) <-- 1.8 (Sept 2020)    Diagnostic Results:  CT: Reviewed     FINDINGS:  CHEST     No infiltrates or pleural effusions are identified.  There are a couple of stable subpleural less than 3 mm noncalcified nodule seen within either lung.  There are also a couple of tiny calcified granulomas seen within either lung.     The ascending aorta is mildly dilated but not considered aneurysmal and measures approximately 3.6 cm.  There is no pericardial effusion.  No enlarged mediastinal, hilar or axillary lymph nodes are identified.     ABDOMEN     Liver/gallbladder/biliary: The liver demonstrates no focal abnormality. The gallbladder is present and unremarkable.  No biliary ductal dilation.     Pancreas: The pancreas is unremarkable in appearance.     Spleen: The spleen is not enlarged.     Adrenals: Unremarkable     Kidneys: The kidneys are equally perfused and demonstrate no solid masses.  Small probable cyst seen within the upper pole of the left kidney.  There is a nonobstructing upper pole left renal calculus that measures approximately 3 mm in size.     Bowel/Mesentery: There is no evidence of bowel obstruction. Postsurgical changes seen  in the region of the sigmoid colon with the linear intraluminal density noted in the region of postsurgical changes as well.  No mesenteric stranding or adenopathy.     Retroperitoneum: No adenopathy.  The aorta demonstrates a normal caliber.     PELVIS     Genitourinary/Reproductive organs: A right adnexal cyst is again noted that measures up to approximately 3.9 cm in size.     Adenopathy: None     Free Fluid: No free fluid     Osseus Structures/Soft tissues: No suspicious appearing osseus lesions. No significant soft tissue abnormality.     Impression:     1. No evidence to suggest metastatic disease.  2. Postsurgical changes noted in the region of the sigmoid colon.  3. Remaining findings as discussed above.    ASSESSMENT/PLAN:     58yo F with rectosigmoid adenocarcinoma with negative workup for metastatic disease who is now s/p lap LAR on 10/8/2020 with final path showing Stage 1: T2N0 adenocarcinoma    - no further interventions required at this time  - Will need CEA level in next 3-6 months  - CT C/A/P due next year  - Colonoscopy due in 3 years  - patient states that she has to have gynecologic surgery, she prefers this to be done in [place].  Discussed with her that I would be happy to be available to assist the gynecology oncology service here with any adhesiolysis or exposure in the pelvis due to her previous rectosigmoid surgery if necessary  - RTC 3-6 months    Ziggy Hannah MD  Colon and Rectal Surgery  [facility] [place]

## 2022-04-18 NOTE — TELEPHONE ENCOUNTER
"The following message was sent to Dr Handy staff:    "Good afternoon! This is a patient of Dr Hannah's that was seen today in clinic. Dr Hannah needs Mrs Fuentes to [facility]. I believe he spoke to Dr Noel earlier about this patient. Can you please help me get her scheduled? Please let me know if I can help with anything."  "

## 2022-04-19 ENCOUNTER — TELEPHONE (OUTPATIENT)
Dept: GYNECOLOGIC ONCOLOGY | Facility: CLINIC | Age: 57
End: 2022-04-19
Payer: COMMERCIAL

## 2022-04-19 NOTE — TELEPHONE ENCOUNTER
----- Message from Lyudmila Peña MA sent at 4/19/2022 10:21 AM CDT -----  Regarding: FW: New Patient Referral  Can you add her to Welcome schedule for 5/4 and cancel her appointment with ,  is aware it will be overbooked  ----- Message -----  From: Sheree Babcock LPN  Sent: 4/18/2022   4:00 PM CDT  To: Bert Cramer Staff  Subject: New Patient Referral                             Good afternoon! This is a patient of Dr Hannah's that was seen today in clinic. Dr Hannah needs Mrs Fuentes to [facility] Dr [facility]el [facility]. I believe he spoke to Dr Noel earlier about this patient. Can you please help me get her scheduled? Please let me know if I can help with anything.

## 2022-04-19 NOTE — TELEPHONE ENCOUNTER
Spoke with our patient about her reschedule appointment in gyn oncology she agreed she voiced understanding of the date, time and location. All questions answered appointment mail. MA/KVNG /Preceptor John CUMMINGS

## 2022-04-20 ENCOUNTER — PATIENT MESSAGE (OUTPATIENT)
Dept: CARDIOLOGY | Facility: CLINIC | Age: 57
End: 2022-04-20
Payer: COMMERCIAL

## 2022-04-20 RX ORDER — METOPROLOL TARTRATE 25 MG/1
TABLET, FILM COATED ORAL
Qty: 90 TABLET | Refills: 3 | Status: CANCELLED | OUTPATIENT
Start: 2022-04-20

## 2022-04-20 RX ORDER — ATORVASTATIN CALCIUM 40 MG/1
40 TABLET, FILM COATED ORAL NIGHTLY
COMMUNITY
End: 2022-04-20 | Stop reason: SDUPTHER

## 2022-04-20 RX ORDER — ATORVASTATIN CALCIUM 20 MG/1
TABLET, FILM COATED ORAL
Qty: 90 TABLET | Refills: 3 | Status: CANCELLED | OUTPATIENT
Start: 2022-04-20

## 2022-04-20 RX ORDER — ATORVASTATIN CALCIUM 40 MG/1
40 TABLET, FILM COATED ORAL NIGHTLY
Qty: 90 TABLET | Refills: 3 | Status: SHIPPED | OUTPATIENT
Start: 2022-04-20 | End: 2022-10-11 | Stop reason: DRUGHIGH

## 2022-04-20 RX ORDER — METOPROLOL TARTRATE 25 MG/1
25 TABLET, FILM COATED ORAL 2 TIMES DAILY
Qty: 180 TABLET | Refills: 3 | Status: SHIPPED | OUTPATIENT
Start: 2022-04-20 | End: 2022-07-01 | Stop reason: ALTCHOICE

## 2022-04-27 ENCOUNTER — PATIENT MESSAGE (OUTPATIENT)
Dept: INTERNAL MEDICINE | Facility: CLINIC | Age: 57
End: 2022-04-27
Payer: COMMERCIAL

## 2022-04-27 ENCOUNTER — TELEPHONE (OUTPATIENT)
Dept: GYNECOLOGIC ONCOLOGY | Facility: CLINIC | Age: 57
End: 2022-04-27
Payer: COMMERCIAL

## 2022-04-28 ENCOUNTER — PATIENT MESSAGE (OUTPATIENT)
Dept: INTERNAL MEDICINE | Facility: CLINIC | Age: 57
End: 2022-04-28
Payer: COMMERCIAL

## 2022-04-28 ENCOUNTER — TELEPHONE (OUTPATIENT)
Dept: CARDIOLOGY | Facility: CLINIC | Age: 57
End: 2022-04-28
Payer: COMMERCIAL

## 2022-04-28 NOTE — TELEPHONE ENCOUNTER
Can you please send the patients message and attachment to Dr. Goss/his staff and get his opinion.  Patient would like to be seen sooner and perhaps if Dr. Gannon can't see her he can provide someone else who is comfortable with her diagnosis.

## 2022-04-28 NOTE — TELEPHONE ENCOUNTER
Spoke to patient and informed her that per Dr. Gannon he will need her cath films before her appt. I had faxed over her release to [facility] at [phone] and will call her when we receive the films and Dr. Gannon reviews. Patient verbalized an understanding and will touch basis with me next week.

## 2022-05-04 ENCOUNTER — OFFICE VISIT (OUTPATIENT)
Dept: GYNECOLOGIC ONCOLOGY | Facility: CLINIC | Age: 57
End: 2022-05-04
Payer: COMMERCIAL

## 2022-05-04 VITALS
BODY MASS INDEX: 30.68 KG/M2 | HEART RATE: 62 BPM | SYSTOLIC BLOOD PRESSURE: 139 MMHG | WEIGHT: 179.69 LBS | DIASTOLIC BLOOD PRESSURE: 80 MMHG | HEIGHT: 64 IN

## 2022-05-04 DIAGNOSIS — N83.209 CYST OF OVARY, UNSPECIFIED LATERALITY: ICD-10-CM

## 2022-05-04 PROCEDURE — 1159F PR MEDICATION LIST DOCUMENTED IN MEDICAL RECORD: ICD-10-PCS | Mod: CPTII,S$GLB,, | Performed by: OBSTETRICS & GYNECOLOGY

## 2022-05-04 PROCEDURE — 3008F BODY MASS INDEX DOCD: CPT | Mod: CPTII,S$GLB,, | Performed by: OBSTETRICS & GYNECOLOGY

## 2022-05-04 PROCEDURE — 3075F SYST BP GE 130 - 139MM HG: CPT | Mod: CPTII,S$GLB,, | Performed by: OBSTETRICS & GYNECOLOGY

## 2022-05-04 PROCEDURE — 1159F MED LIST DOCD IN RCRD: CPT | Mod: CPTII,S$GLB,, | Performed by: OBSTETRICS & GYNECOLOGY

## 2022-05-04 PROCEDURE — 99999 PR PBB SHADOW E&M-EST. PATIENT-LVL IV: CPT | Mod: PBBFAC,,, | Performed by: OBSTETRICS & GYNECOLOGY

## 2022-05-04 PROCEDURE — 99999 PR PBB SHADOW E&M-EST. PATIENT-LVL IV: ICD-10-PCS | Mod: PBBFAC,,, | Performed by: OBSTETRICS & GYNECOLOGY

## 2022-05-04 PROCEDURE — 99205 PR OFFICE/OUTPT VISIT, NEW, LEVL V, 60-74 MIN: ICD-10-PCS | Mod: S$GLB,,, | Performed by: OBSTETRICS & GYNECOLOGY

## 2022-05-04 PROCEDURE — 3079F DIAST BP 80-89 MM HG: CPT | Mod: CPTII,S$GLB,, | Performed by: OBSTETRICS & GYNECOLOGY

## 2022-05-04 PROCEDURE — 3075F PR MOST RECENT SYSTOLIC BLOOD PRESS GE 130-139MM HG: ICD-10-PCS | Mod: CPTII,S$GLB,, | Performed by: OBSTETRICS & GYNECOLOGY

## 2022-05-04 PROCEDURE — 3079F PR MOST RECENT DIASTOLIC BLOOD PRESSURE 80-89 MM HG: ICD-10-PCS | Mod: CPTII,S$GLB,, | Performed by: OBSTETRICS & GYNECOLOGY

## 2022-05-04 PROCEDURE — 3008F PR BODY MASS INDEX (BMI) DOCUMENTED: ICD-10-PCS | Mod: CPTII,S$GLB,, | Performed by: OBSTETRICS & GYNECOLOGY

## 2022-05-04 PROCEDURE — 99205 OFFICE O/P NEW HI 60 MIN: CPT | Mod: S$GLB,,, | Performed by: OBSTETRICS & GYNECOLOGY

## 2022-05-04 NOTE — PROGRESS NOTES
Subjective:      Patient ID: Jhoana Sierra is a 57 y.o. female.    Chief Complaint: Consult      HPI  Referred by Dr. Hannah for pelvic mass and coordination of care locally here in [place].    Pelvic US   FINDINGS:  Uterus:  Size: 6.9 x 2.5 x 4.2 cm  Endometrium: The endometrium measures 6.5 mm in thickness.  No discrete endometrial lesion.  Right ovary: Size: 5.5 x 2.9 x 3.6 cm  Appearance: There is a mildly complex cyst arising from the right ovary measuring 5.1 x 2.7 x 3.8 cm which corresponds to the lesion seen on recent CT exam.  This cyst contains an internal septation.  No focal nodular component within this cyst.  Left ovary: Not visualized secondary to adjacent bowel gas.  Free Fluid: None.     12.4, not elevated.      CT for prior comparison 2020: There is a right adnexal cystic lesion seen which measures 3.2 cm.    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 Currently on ASA. S/p heart cath.   She is overall asymptomatic from a pelvic mass standpoint.     Prior abdominal surgeries include laparoscopic sigmoid colon resection with Dr. Hannah for colon cancer, ovarian cystectomy in  for what she says was a dermoid cyst of the ovary/unsure which side vix pfannenstiel.  x 2.      Denies postmenopausal bleeding. Dr. Mederos is her primary ob/gyn.   Review of Systems   Constitutional: Negative for appetite change, chills, fatigue and fever.   HENT: Negative for mouth sores.    Respiratory: Negative for cough and shortness of breath.    Cardiovascular: Negative for leg swelling.   Gastrointestinal: Negative for abdominal pain, blood in stool, constipation and diarrhea.   Endocrine: Negative for cold intolerance.   Genitourinary: Negative for dysuria and vaginal bleeding.   Musculoskeletal: Negative for myalgias.   Skin: Negative for rash.   Allergic/Immunologic: Negative.    Neurological: Negative for weakness and  numbness.   Hematological: Negative for adenopathy. Does not bruise/bleed easily.   Psychiatric/Behavioral: Negative for confusion.       Past Medical History:   Diagnosis Date    Adenocarcinoma of colon     Colon cancer 2020    Heart attack 01/22/2022    PONV (postoperative nausea and vomiting)      Past Surgical History:   Procedure Laterality Date    COLONOSCOPY N/A 8/25/2020    Procedure: COLONOSCOPY;  Surgeon: Rianna Dillon MD;  Location: [facility] ENDO;  Service: Endoscopy;  Laterality: N/A;    COLONOSCOPY N/A 1/7/2022    Procedure: COLONOSCOPY;  Surgeon: Ziggy Hannah MD;  Location: [facility] ENDO;  Service: General;  Laterality: N/A;    FLEXIBLE SIGMOIDOSCOPY N/A 10/8/2020    Procedure: SIGMOIDOSCOPY, FLEXIBLE;  Surgeon: Ziggy Hannah MD;  Location: [facility] OR;  Service: General;  Laterality: N/A;    INJECTION OF ANESTHETIC AGENT INTO TISSUE PLANE DEFINED BY TRANSVERSUS ABDOMINIS MUSCLE N/A 10/8/2020    Procedure: BLOCK, TRANSVERSUS ABDOMINIS PLANE;  Surgeon: Ziggy Hannah MD;  Location: [facility] OR;  Service: General;  Laterality: N/A;    LAPAROSCOPIC SIGMOIDECTOMY N/A 10/8/2020    Procedure: COLECTOMY, SIGMOID, LAPAROSCOPIC;  Surgeon: Ziggy Hannah MD;  Location: [facility] OR;  Service: General;  Laterality: N/A;  lower anterior resection  Proctosigmoidectomy    LAPAROTOMY N/A 10/8/2020    Procedure: LAPAROTOMY;  Surgeon: Ziggy Hannah MD;  Location: [facility] OR;  Service: General;  Laterality: N/A;    OVARIAN CYST REMOVAL      WISDOM TOOTH EXTRACTION       Family History   Problem Relation Age of Onset    Fibromyalgia Sister     Diabetes Neg Hx     Heart disease Neg Hx      Social History     Socioeconomic History    Marital status:     Number of children: 2   Occupational History    Occupation: Not working currently   Tobacco Use    Smoking status: Never Smoker    Smokeless tobacco: Never Used   Substance and Sexual Activity    Alcohol use: Yes     Comment: Occ use; HOLD  72HRS PRIOR TO SURGERY    Drug use: No    Sexual activity: Yes     Partners: Male     Birth control/protection: Post-menopausal     Social Determinants of Health     Financial Resource Strain: Low Risk     Difficulty of Paying Living Expenses: Not hard at all   Food Insecurity: No Food Insecurity    Worried About Running Out of Food in the Last Year: Never true    Ran Out of Food in the Last Year: Never true   Transportation Needs: No Transportation Needs    Lack of Transportation (Medical): No    Lack of Transportation (Non-Medical): No   Physical Activity: Insufficiently Active    Days of Exercise per Week: 3 days    Minutes of Exercise per Session: 30 min   Stress: No Stress Concern Present    Feeling of Stress : Not at all   Social Connections: Unknown    Frequency of Communication with Friends and Family: More than three times a week    Frequency of Social Gatherings with Friends and Family: More than three times a week    Active Member of Clubs or Organizations: No    Attends Club or Organization Meetings: Never    Marital Status:    Housing Stability: Low Risk     Unable to Pay for Housing in the Last Year: No    Number of Places Lived in the Last Year: 1    Unstable Housing in the Last Year: No     Current Outpatient Medications   Medication Sig    aspirin 81 mg Cap 81 MG  .    atorvastatin (LIPITOR) 40 MG tablet Take 1 tablet (40 mg total) by mouth every evening.    co-enzyme Q-10 50 mg capsule Take 100 mg by mouth once daily.    meloxicam (MOBIC) 15 MG tablet TAKE 1 TABLET BY MOUTH ONCE DAILY AS NEEDED FOR PAIN    metoprolol tartrate (LOPRESSOR) 25 MG tablet Take 1 tablet (25 mg total) by mouth 2 (two) times daily.    tiZANidine (ZANAFLEX) 4 MG tablet Take 1 tablet (4 mg total) by mouth 3 (three) times daily as needed (muscle spasm).     No current facility-administered medications for this visit.     Review of patient's allergies indicates:  No Known Allergies    Objective:    Physical Exam:   Constitutional: She is oriented to person, place, and time. She appears well-developed and well-nourished.    HENT:   Head: Normocephalic and atraumatic.    Eyes: Pupils are equal, round, and reactive to light. EOM are normal.    Neck: No thyromegaly present.    Cardiovascular: Normal rate, regular rhythm and intact distal pulses.     Pulmonary/Chest: Effort normal and breath sounds normal. No respiratory distress. She has no wheezes.        Abdominal: Soft. Bowel sounds are normal. She exhibits no distension and no mass. There is no abdominal tenderness.             Musculoskeletal: Normal range of motion and moves all extremeties.      Lymphadenopathy:     She has no cervical adenopathy.        Right: No supraclavicular adenopathy present.        Left: No supraclavicular adenopathy present.    Neurological: She is alert and oriented to person, place, and time.    Skin: Skin is warm and dry. No rash noted.    Psychiatric: She has a normal mood and affect.       Assessment:     1. Cyst of ovary, unspecified laterality        Plan:   No orders of the defined types were placed in this encounter.      I discussed with the patient the differential diagnosis for pelvic mass in a post menopausal woman including benign, borderline and malignant etiologies.  is normal and imaging characteristics are minimally complex. The cyst has been present for several years, however is slightly larger over serial imaging. She is aware definitive diagnosis can only be made with surgical resection. I have recommended surgical exploration and removal. She is a candidate for minimally invasive approach. Would plan for robotic USO/possible staging/any other indicated procedures based on intraoperative findings.     Plan for coordination of care and surgery here in [place] with Dr. Hannah.     Recent MI and heart cath so will await cardiology evaluation for perioperative risk assessment prior to scheduling surgery.      I spent approximately 60 minutes reviewing the available records and evaluating the patient, out of which over 50% of the time was spent face to face with the patient in counseling and coordinating this patient's care.

## 2022-05-09 ENCOUNTER — PATIENT MESSAGE (OUTPATIENT)
Dept: CARDIOLOGY | Facility: CLINIC | Age: 57
End: 2022-05-09
Payer: COMMERCIAL

## 2022-05-18 ENCOUNTER — PATIENT MESSAGE (OUTPATIENT)
Dept: CARDIOLOGY | Facility: CLINIC | Age: 57
End: 2022-05-18
Payer: COMMERCIAL

## 2022-06-02 ENCOUNTER — LAB VISIT (OUTPATIENT)
Dept: LAB | Facility: HOSPITAL | Age: 57
End: 2022-06-02
Attending: FAMILY MEDICINE
Payer: COMMERCIAL

## 2022-06-02 ENCOUNTER — PATIENT MESSAGE (OUTPATIENT)
Dept: CARDIOLOGY | Facility: CLINIC | Age: 57
End: 2022-06-02
Payer: COMMERCIAL

## 2022-06-02 DIAGNOSIS — Z00.00 ANNUAL PHYSICAL EXAM: ICD-10-CM

## 2022-06-02 LAB
ALBUMIN SERPL BCP-MCNC: 4.3 G/DL (ref 3.5–5.2)
ALP SERPL-CCNC: 181 U/L (ref 55–135)
ALT SERPL W/O P-5'-P-CCNC: 214 U/L (ref 10–44)
ANION GAP SERPL CALC-SCNC: 11 MMOL/L (ref 8–16)
AST SERPL-CCNC: 130 U/L (ref 10–40)
BASOPHILS # BLD AUTO: 0.03 K/UL (ref 0–0.2)
BASOPHILS NFR BLD: 0.8 % (ref 0–1.9)
BILIRUB SERPL-MCNC: 0.5 MG/DL (ref 0.1–1)
BUN SERPL-MCNC: 20 MG/DL (ref 6–20)
CALCIUM SERPL-MCNC: 9.6 MG/DL (ref 8.7–10.5)
CHLORIDE SERPL-SCNC: 102 MMOL/L (ref 95–110)
CHOLEST SERPL-MCNC: 158 MG/DL (ref 120–199)
CHOLEST/HDLC SERPL: 2.3 {RATIO} (ref 2–5)
CO2 SERPL-SCNC: 25 MMOL/L (ref 23–29)
CREAT SERPL-MCNC: 0.7 MG/DL (ref 0.5–1.4)
DIFFERENTIAL METHOD: ABNORMAL
EOSINOPHIL # BLD AUTO: 0.1 K/UL (ref 0–0.5)
EOSINOPHIL NFR BLD: 2.2 % (ref 0–8)
ERYTHROCYTE [DISTWIDTH] IN BLOOD BY AUTOMATED COUNT: 12.5 % (ref 11.5–14.5)
EST. GFR  (AFRICAN AMERICAN): >60 ML/MIN/1.73 M^2
EST. GFR  (NON AFRICAN AMERICAN): >60 ML/MIN/1.73 M^2
ESTIMATED AVG GLUCOSE: 111 MG/DL (ref 68–131)
GLUCOSE SERPL-MCNC: 76 MG/DL (ref 70–110)
HBA1C MFR BLD: 5.5 % (ref 4–5.6)
HCT VFR BLD AUTO: 41.2 % (ref 37–48.5)
HDLC SERPL-MCNC: 70 MG/DL (ref 40–75)
HDLC SERPL: 44.3 % (ref 20–50)
HGB BLD-MCNC: 12.9 G/DL (ref 12–16)
IMM GRANULOCYTES # BLD AUTO: 0.01 K/UL (ref 0–0.04)
IMM GRANULOCYTES NFR BLD AUTO: 0.3 % (ref 0–0.5)
LDLC SERPL CALC-MCNC: 75.2 MG/DL (ref 63–159)
LYMPHOCYTES # BLD AUTO: 1.2 K/UL (ref 1–4.8)
LYMPHOCYTES NFR BLD: 32.3 % (ref 18–48)
MCH RBC QN AUTO: 30.7 PG (ref 27–31)
MCHC RBC AUTO-ENTMCNC: 31.3 G/DL (ref 32–36)
MCV RBC AUTO: 98 FL (ref 82–98)
MONOCYTES # BLD AUTO: 0.3 K/UL (ref 0.3–1)
MONOCYTES NFR BLD: 9.6 % (ref 4–15)
NEUTROPHILS # BLD AUTO: 2 K/UL (ref 1.8–7.7)
NEUTROPHILS NFR BLD: 54.8 % (ref 38–73)
NONHDLC SERPL-MCNC: 88 MG/DL
NRBC BLD-RTO: 0 /100 WBC
PLATELET # BLD AUTO: 239 K/UL (ref 150–450)
PMV BLD AUTO: 10.3 FL (ref 9.2–12.9)
POTASSIUM SERPL-SCNC: 4.3 MMOL/L (ref 3.5–5.1)
PROT SERPL-MCNC: 7.4 G/DL (ref 6–8.4)
RBC # BLD AUTO: 4.2 M/UL (ref 4–5.4)
SODIUM SERPL-SCNC: 138 MMOL/L (ref 136–145)
TRIGL SERPL-MCNC: 64 MG/DL (ref 30–150)
TSH SERPL DL<=0.005 MIU/L-ACNC: 2.56 UIU/ML (ref 0.4–4)
WBC # BLD AUTO: 3.56 K/UL (ref 3.9–12.7)

## 2022-06-02 PROCEDURE — 85025 COMPLETE CBC W/AUTO DIFF WBC: CPT | Performed by: FAMILY MEDICINE

## 2022-06-02 PROCEDURE — 36415 COLL VENOUS BLD VENIPUNCTURE: CPT | Mod: PO | Performed by: FAMILY MEDICINE

## 2022-06-02 PROCEDURE — 80061 LIPID PANEL: CPT | Performed by: FAMILY MEDICINE

## 2022-06-02 PROCEDURE — 83036 HEMOGLOBIN GLYCOSYLATED A1C: CPT | Performed by: FAMILY MEDICINE

## 2022-06-02 PROCEDURE — 80053 COMPREHEN METABOLIC PANEL: CPT | Performed by: FAMILY MEDICINE

## 2022-06-02 PROCEDURE — 84443 ASSAY THYROID STIM HORMONE: CPT | Performed by: FAMILY MEDICINE

## 2022-06-03 ENCOUNTER — PATIENT MESSAGE (OUTPATIENT)
Dept: GYNECOLOGIC ONCOLOGY | Facility: CLINIC | Age: 57
End: 2022-06-03
Payer: COMMERCIAL

## 2022-06-13 ENCOUNTER — PATIENT MESSAGE (OUTPATIENT)
Dept: INTERNAL MEDICINE | Facility: CLINIC | Age: 57
End: 2022-06-13

## 2022-06-13 ENCOUNTER — OFFICE VISIT (OUTPATIENT)
Dept: INTERNAL MEDICINE | Facility: CLINIC | Age: 57
End: 2022-06-13
Payer: COMMERCIAL

## 2022-06-13 ENCOUNTER — LAB VISIT (OUTPATIENT)
Dept: LAB | Facility: HOSPITAL | Age: 57
End: 2022-06-13
Attending: FAMILY MEDICINE
Payer: COMMERCIAL

## 2022-06-13 VITALS
HEART RATE: 71 BPM | DIASTOLIC BLOOD PRESSURE: 78 MMHG | TEMPERATURE: 98 F | WEIGHT: 171.75 LBS | BODY MASS INDEX: 29.32 KG/M2 | HEIGHT: 64 IN | SYSTOLIC BLOOD PRESSURE: 134 MMHG | OXYGEN SATURATION: 98 %

## 2022-06-13 DIAGNOSIS — R74.8 ELEVATED LIVER ENZYMES: ICD-10-CM

## 2022-06-13 DIAGNOSIS — Z00.00 ANNUAL PHYSICAL EXAM: Primary | ICD-10-CM

## 2022-06-13 LAB
ALBUMIN SERPL BCP-MCNC: 4.5 G/DL (ref 3.5–5.2)
ALP SERPL-CCNC: 188 U/L (ref 55–135)
ALT SERPL W/O P-5'-P-CCNC: 145 U/L (ref 10–44)
AST SERPL-CCNC: 82 U/L (ref 10–40)
BILIRUB DIRECT SERPL-MCNC: 0.2 MG/DL (ref 0.1–0.3)
BILIRUB SERPL-MCNC: 0.6 MG/DL (ref 0.1–1)
PROT SERPL-MCNC: 7.2 G/DL (ref 6–8.4)

## 2022-06-13 PROCEDURE — 1159F PR MEDICATION LIST DOCUMENTED IN MEDICAL RECORD: ICD-10-PCS | Mod: CPTII,S$GLB,, | Performed by: FAMILY MEDICINE

## 2022-06-13 PROCEDURE — 3044F PR MOST RECENT HEMOGLOBIN A1C LEVEL <7.0%: ICD-10-PCS | Mod: CPTII,S$GLB,, | Performed by: FAMILY MEDICINE

## 2022-06-13 PROCEDURE — 99396 PR PREVENTIVE VISIT,EST,40-64: ICD-10-PCS | Mod: S$GLB,,, | Performed by: FAMILY MEDICINE

## 2022-06-13 PROCEDURE — 99999 PR PBB SHADOW E&M-EST. PATIENT-LVL IV: ICD-10-PCS | Mod: PBBFAC,,, | Performed by: FAMILY MEDICINE

## 2022-06-13 PROCEDURE — 3078F DIAST BP <80 MM HG: CPT | Mod: CPTII,S$GLB,, | Performed by: FAMILY MEDICINE

## 2022-06-13 PROCEDURE — 80076 HEPATIC FUNCTION PANEL: CPT | Performed by: FAMILY MEDICINE

## 2022-06-13 PROCEDURE — 99999 PR PBB SHADOW E&M-EST. PATIENT-LVL IV: CPT | Mod: PBBFAC,,, | Performed by: FAMILY MEDICINE

## 2022-06-13 PROCEDURE — 3075F SYST BP GE 130 - 139MM HG: CPT | Mod: CPTII,S$GLB,, | Performed by: FAMILY MEDICINE

## 2022-06-13 PROCEDURE — 3078F PR MOST RECENT DIASTOLIC BLOOD PRESSURE < 80 MM HG: ICD-10-PCS | Mod: CPTII,S$GLB,, | Performed by: FAMILY MEDICINE

## 2022-06-13 PROCEDURE — 3075F PR MOST RECENT SYSTOLIC BLOOD PRESS GE 130-139MM HG: ICD-10-PCS | Mod: CPTII,S$GLB,, | Performed by: FAMILY MEDICINE

## 2022-06-13 PROCEDURE — 80074 ACUTE HEPATITIS PANEL: CPT | Performed by: FAMILY MEDICINE

## 2022-06-13 PROCEDURE — 99396 PREV VISIT EST AGE 40-64: CPT | Mod: S$GLB,,, | Performed by: FAMILY MEDICINE

## 2022-06-13 PROCEDURE — 1160F RVW MEDS BY RX/DR IN RCRD: CPT | Mod: CPTII,S$GLB,, | Performed by: FAMILY MEDICINE

## 2022-06-13 PROCEDURE — 3044F HG A1C LEVEL LT 7.0%: CPT | Mod: CPTII,S$GLB,, | Performed by: FAMILY MEDICINE

## 2022-06-13 PROCEDURE — 1160F PR REVIEW ALL MEDS BY PRESCRIBER/CLIN PHARMACIST DOCUMENTED: ICD-10-PCS | Mod: CPTII,S$GLB,, | Performed by: FAMILY MEDICINE

## 2022-06-13 PROCEDURE — 3008F BODY MASS INDEX DOCD: CPT | Mod: CPTII,S$GLB,, | Performed by: FAMILY MEDICINE

## 2022-06-13 PROCEDURE — 36415 COLL VENOUS BLD VENIPUNCTURE: CPT | Mod: PO | Performed by: FAMILY MEDICINE

## 2022-06-13 PROCEDURE — 3008F PR BODY MASS INDEX (BMI) DOCUMENTED: ICD-10-PCS | Mod: CPTII,S$GLB,, | Performed by: FAMILY MEDICINE

## 2022-06-13 PROCEDURE — 1159F MED LIST DOCD IN RCRD: CPT | Mod: CPTII,S$GLB,, | Performed by: FAMILY MEDICINE

## 2022-06-14 LAB
HAV IGM SERPL QL IA: NEGATIVE
HBV CORE IGM SERPL QL IA: NEGATIVE
HBV SURFACE AG SERPL QL IA: NEGATIVE
HCV AB SERPL QL IA: NEGATIVE

## 2022-06-15 ENCOUNTER — PATIENT MESSAGE (OUTPATIENT)
Dept: INTERNAL MEDICINE | Facility: CLINIC | Age: 57
End: 2022-06-15
Payer: COMMERCIAL

## 2022-06-15 DIAGNOSIS — R74.8 ELEVATED LIVER ENZYMES: Primary | ICD-10-CM

## 2022-07-01 ENCOUNTER — PATIENT MESSAGE (OUTPATIENT)
Dept: GYNECOLOGIC ONCOLOGY | Facility: CLINIC | Age: 57
End: 2022-07-01
Payer: COMMERCIAL

## 2022-07-01 ENCOUNTER — HOSPITAL ENCOUNTER (OUTPATIENT)
Dept: CARDIOLOGY | Facility: HOSPITAL | Age: 57
Discharge: HOME | End: 2022-07-01
Attending: INTERNAL MEDICINE
Payer: COMMERCIAL

## 2022-07-01 ENCOUNTER — OFFICE VISIT (OUTPATIENT)
Dept: CARDIOLOGY | Facility: CLINIC | Age: 57
End: 2022-07-01
Payer: COMMERCIAL

## 2022-07-01 VITALS
HEIGHT: 64 IN | WEIGHT: 170 LBS | SYSTOLIC BLOOD PRESSURE: 123 MMHG | DIASTOLIC BLOOD PRESSURE: 71 MMHG | HEART RATE: 59 BPM | BODY MASS INDEX: 29.02 KG/M2 | OXYGEN SATURATION: 98 %

## 2022-07-01 DIAGNOSIS — R94.31 ABNORMAL EKG: ICD-10-CM

## 2022-07-01 DIAGNOSIS — I21.02 ST ELEVATION MYOCARDIAL INFARCTION INVOLVING LEFT ANTERIOR DESCENDING (LAD) CORONARY ARTERY: ICD-10-CM

## 2022-07-01 DIAGNOSIS — I25.42 SPONTANEOUS DISSECTION OF CORONARY ARTERY: Primary | ICD-10-CM

## 2022-07-01 DIAGNOSIS — I25.10 CORONARY ARTERY DISEASE INVOLVING NATIVE CORONARY ARTERY OF NATIVE HEART WITHOUT ANGINA PECTORIS: ICD-10-CM

## 2022-07-01 DIAGNOSIS — R79.89 ELEVATED LFTS: ICD-10-CM

## 2022-07-01 PROCEDURE — 93010 EKG 12-LEAD: ICD-10-PCS | Mod: ,,, | Performed by: INTERNAL MEDICINE

## 2022-07-01 PROCEDURE — 3044F HG A1C LEVEL LT 7.0%: CPT | Mod: CPTII,S$GLB,, | Performed by: INTERNAL MEDICINE

## 2022-07-01 PROCEDURE — 1159F PR MEDICATION LIST DOCUMENTED IN MEDICAL RECORD: ICD-10-PCS | Mod: CPTII,S$GLB,, | Performed by: INTERNAL MEDICINE

## 2022-07-01 PROCEDURE — 99999 PR PBB SHADOW E&M-EST. PATIENT-LVL III: CPT | Mod: PBBFAC,,, | Performed by: INTERNAL MEDICINE

## 2022-07-01 PROCEDURE — 3078F PR MOST RECENT DIASTOLIC BLOOD PRESSURE < 80 MM HG: ICD-10-PCS | Mod: CPTII,S$GLB,, | Performed by: INTERNAL MEDICINE

## 2022-07-01 PROCEDURE — 3008F PR BODY MASS INDEX (BMI) DOCUMENTED: ICD-10-PCS | Mod: CPTII,S$GLB,, | Performed by: INTERNAL MEDICINE

## 2022-07-01 PROCEDURE — 1160F PR REVIEW ALL MEDS BY PRESCRIBER/CLIN PHARMACIST DOCUMENTED: ICD-10-PCS | Mod: CPTII,S$GLB,, | Performed by: INTERNAL MEDICINE

## 2022-07-01 PROCEDURE — 99214 OFFICE O/P EST MOD 30 MIN: CPT | Mod: S$GLB,,, | Performed by: INTERNAL MEDICINE

## 2022-07-01 PROCEDURE — 93005 ELECTROCARDIOGRAM TRACING: CPT

## 2022-07-01 PROCEDURE — 3074F PR MOST RECENT SYSTOLIC BLOOD PRESSURE < 130 MM HG: ICD-10-PCS | Mod: CPTII,S$GLB,, | Performed by: INTERNAL MEDICINE

## 2022-07-01 PROCEDURE — 3078F DIAST BP <80 MM HG: CPT | Mod: CPTII,S$GLB,, | Performed by: INTERNAL MEDICINE

## 2022-07-01 PROCEDURE — 1160F RVW MEDS BY RX/DR IN RCRD: CPT | Mod: CPTII,S$GLB,, | Performed by: INTERNAL MEDICINE

## 2022-07-01 PROCEDURE — 3008F BODY MASS INDEX DOCD: CPT | Mod: CPTII,S$GLB,, | Performed by: INTERNAL MEDICINE

## 2022-07-01 PROCEDURE — 1159F MED LIST DOCD IN RCRD: CPT | Mod: CPTII,S$GLB,, | Performed by: INTERNAL MEDICINE

## 2022-07-01 PROCEDURE — 93010 ELECTROCARDIOGRAM REPORT: CPT | Mod: ,,, | Performed by: INTERNAL MEDICINE

## 2022-07-01 PROCEDURE — 99214 PR OFFICE/OUTPT VISIT, EST, LEVL IV, 30-39 MIN: ICD-10-PCS | Mod: S$GLB,,, | Performed by: INTERNAL MEDICINE

## 2022-07-01 PROCEDURE — 3044F PR MOST RECENT HEMOGLOBIN A1C LEVEL <7.0%: ICD-10-PCS | Mod: CPTII,S$GLB,, | Performed by: INTERNAL MEDICINE

## 2022-07-01 PROCEDURE — 3074F SYST BP LT 130 MM HG: CPT | Mod: CPTII,S$GLB,, | Performed by: INTERNAL MEDICINE

## 2022-07-01 PROCEDURE — 99999 PR PBB SHADOW E&M-EST. PATIENT-LVL III: ICD-10-PCS | Mod: PBBFAC,,, | Performed by: INTERNAL MEDICINE

## 2022-07-01 RX ORDER — METOPROLOL SUCCINATE 25 MG/1
25 TABLET, EXTENDED RELEASE ORAL DAILY
Qty: 30 TABLET | Refills: 11 | Status: SHIPPED | OUTPATIENT
Start: 2022-07-01 | End: 2023-07-24

## 2022-07-01 NOTE — PROGRESS NOTES
Subjective:   Patient ID:  Jhoana Sierra is a 57 y.o. female who presents for follow up of Follow-up      HPI   2/14/2022 DR COOK  56 yo female, came in for post STEMI/ coronary dissection f/u  PMH h/o Colon cancer emoval parts in  no chemo and XRT  and in remission, fibromyalgia  01/24/2022 admitted to [facility] for STEMI. Emergent cath showed spontaneous mild to distal LAD dissection, normal EF and filling pressure. ascending TAA 4 cm.  Chest CTA showed no PE and TAA  Brain CT negative.   ABD CTA showed slight beaded appurtenance of shelbi l A. Typical for fibromuscular dysplasia  Vascularis workup negative for NIRANJAN CRP KHANG and ANCA.   D/c with ASA lipitor and metoprolol  Now no chest pain.     7/1/2022   HAS OCCASIONAL SHARP CHEST PAIN DIFFERENT THAN HER ORIGINAL EVENT  WITH CORONARY DISSECTION OF DISTAL LAD. SHE ASCENCIO SNO EXERTIONAL SYMPTOMS BACK TO YARD WORK AND BEING PHYSICALLY ACTIVE. HAS NO CHF SYMPTOMS HAS NO PALPITATION. SHE NEEDS SURGERY ON HER OVARIAN CYST. SHE IS HERE FOR CARDIAC CLEARANCE. NO H/O HTN OR ANY FAMILY H/O SCAT. HAD FIBROMUSCULAR DYSPLASIA ON RENAL ARTERIES.HAD INCREASED LFT NECESSITATING CESSATION OF STATIN THERAPY.   Past Medical History:   Diagnosis Date    Adenocarcinoma of colon     Colon cancer 2020    Heart attack 01/22/2022    PONV (postoperative nausea and vomiting)        Past Surgical History:   Procedure Laterality Date    COLONOSCOPY N/A 8/25/2020    Procedure: COLONOSCOPY;  Surgeon: Rianna Dillon MD;  Location: [facility];  Service: Endoscopy;  Laterality: N/A;    COLONOSCOPY N/A 1/7/2022    Procedure: COLONOSCOPY;  Surgeon: Ziggy Hannah MD;  Location: [facility] ENDO;  Service: General;  Laterality: N/A;    FLEXIBLE SIGMOIDOSCOPY N/A 10/8/2020    Procedure: SIGMOIDOSCOPY, FLEXIBLE;  Surgeon: Ziggy Hannah MD;  Location: [facility] OR;  Service: General;  Laterality: N/A;    INJECTION OF ANESTHETIC AGENT INTO TISSUE PLANE DEFINED BY TRANSVERSUS ABDOMINIS MUSCLE N/A  10/8/2020    Procedure: BLOCK, TRANSVERSUS ABDOMINIS PLANE;  Surgeon: Ziggy Hannah MD;  Location: [facility] OR;  Service: General;  Laterality: N/A;    LAPAROSCOPIC SIGMOIDECTOMY N/A 10/8/2020    Procedure: COLECTOMY, SIGMOID, LAPAROSCOPIC;  Surgeon: Ziggy Hannah MD;  Location: [facility] OR;  Service: General;  Laterality: N/A;  lower anterior resection  Proctosigmoidectomy    LAPAROTOMY N/A 10/8/2020    Procedure: LAPAROTOMY;  Surgeon: Ziggy Hannah MD;  Location: [facility] OR;  Service: General;  Laterality: N/A;    OVARIAN CYST REMOVAL      WISDOM TOOTH EXTRACTION         Social History     Tobacco Use    Smoking status: Never Smoker    Smokeless tobacco: Never Used   Substance Use Topics    Alcohol use: Yes     Comment: Occ use; HOLD 72HRS PRIOR TO SURGERY    Drug use: No       Family History   Problem Relation Age of Onset    Fibromyalgia Sister     Diabetes Neg Hx     Heart disease Neg Hx        Current Outpatient Medications   Medication Sig    aspirin 81 mg Cap 81 MG  .    metoprolol tartrate (LOPRESSOR) 25 MG tablet Take 1 tablet (25 mg total) by mouth 2 (two) times daily.    atorvastatin (LIPITOR) 40 MG tablet Take 1 tablet (40 mg total) by mouth every evening. (Patient not taking: Reported on 7/1/2022)    co-enzyme Q-10 50 mg capsule Take 100 mg by mouth once daily.    multivitamin (THERAGRAN) per tablet Take 1 tablet by mouth once daily.     No current facility-administered medications for this visit.     Current Outpatient Medications on File Prior to Visit   Medication Sig    aspirin 81 mg Cap 81 MG  .    metoprolol tartrate (LOPRESSOR) 25 MG tablet Take 1 tablet (25 mg total) by mouth 2 (two) times daily.    atorvastatin (LIPITOR) 40 MG tablet Take 1 tablet (40 mg total) by mouth every evening. (Patient not taking: Reported on 7/1/2022)    co-enzyme Q-10 50 mg capsule Take 100 mg by mouth once daily.    multivitamin (THERAGRAN) per tablet Take 1 tablet by mouth once daily.      No current facility-administered medications on file prior to visit.     Review of patient's allergies indicates:  No Known Allergies  Review of Systems   Constitutional: Negative for diaphoresis, malaise/fatigue and weight gain.   HENT: Negative for hoarse voice.    Eyes: Negative for double vision and visual disturbance.   Cardiovascular: Positive for chest pain. Negative for claudication, cyanosis, dyspnea on exertion, irregular heartbeat, leg swelling, near-syncope, orthopnea, palpitations, paroxysmal nocturnal dyspnea and syncope.   Respiratory: Negative for cough, hemoptysis, shortness of breath and snoring.    Hematologic/Lymphatic: Negative for bleeding problem. Does not bruise/bleed easily.   Skin: Negative for color change and poor wound healing.   Musculoskeletal: Negative for muscle cramps, muscle weakness and myalgias.   Gastrointestinal: Negative for bloating, abdominal pain, change in bowel habit, diarrhea, heartburn, hematemesis, hematochezia, melena and nausea.   Neurological: Negative for excessive daytime sleepiness, dizziness, headaches, light-headedness, loss of balance, numbness and weakness.   Psychiatric/Behavioral: Negative for memory loss. The patient does not have insomnia.    Allergic/Immunologic: Negative for hives.       Objective:   Physical Exam  Constitutional:       General: She is not in acute distress.     Appearance: Normal appearance. She is well-developed. She is not ill-appearing.   HENT:      Head: Normocephalic and atraumatic.   Eyes:      General: No scleral icterus.     Pupils: Pupils are equal, round, and reactive to light.   Neck:      Thyroid: No thyromegaly.      Vascular: Normal carotid pulses. No carotid bruit, hepatojugular reflux or JVD.      Trachea: No tracheal deviation.   Cardiovascular:      Rate and Rhythm: Normal rate and regular rhythm.      Pulses: Normal pulses.      Heart sounds: Normal heart sounds. No murmur heard.    No friction rub. No gallop.  "  Pulmonary:      Effort: Pulmonary effort is normal. No respiratory distress.      Breath sounds: Normal breath sounds. No wheezing, rhonchi or rales.   Chest:      Chest wall: No tenderness.   Abdominal:      General: Bowel sounds are normal. There is no abdominal bruit.      Palpations: Abdomen is soft. There is no hepatomegaly or pulsatile mass.      Tenderness: There is no abdominal tenderness.   Musculoskeletal:      Right shoulder: No deformity.      Cervical back: Normal range of motion and neck supple.   Skin:     General: Skin is warm and dry.      Findings: No erythema or rash.      Nails: There is no clubbing.   Neurological:      Mental Status: She is alert and oriented to person, place, and time.      Cranial Nerves: No cranial nerve deficit.      Coordination: Coordination normal.   Psychiatric:         Speech: Speech normal.         Behavior: Behavior normal.       Vitals:    07/01/22 1357 07/01/22 1403   BP: 120/75 123/71   BP Location: Right arm Left arm   Patient Position: Sitting Sitting   BP Method:  Large (Manual)   Pulse: (!) 59    SpO2: 98%    Weight: 77.1 kg (169 lb 15.6 oz)    Height: 5' 4" (1.626 m)      Lab Results   Component Value Date    CHOL 158 06/02/2022    CHOL 224 (H) 06/17/2021    CHOL 206 (H) 06/10/2020     Lab Results   Component Value Date    HDL 70 06/02/2022    HDL 61 06/17/2021    HDL 75 06/10/2020     Lab Results   Component Value Date    LDLCALC 75.2 06/02/2022    LDLCALC 136.6 06/17/2021    LDLCALC 113.4 06/10/2020     Lab Results   Component Value Date    TRIG 64 06/02/2022    TRIG 132 06/17/2021    TRIG 88 06/10/2020     Lab Results   Component Value Date    CHOLHDL 44.3 06/02/2022    CHOLHDL 27.2 06/17/2021    CHOLHDL 36.4 06/10/2020       Chemistry        Component Value Date/Time     06/02/2022 0854    K 4.3 06/02/2022 0854     06/02/2022 0854    CO2 25 06/02/2022 0854    BUN 20 06/02/2022 0854    CREATININE 0.7 06/02/2022 0854    GLU 76 06/02/2022 0854 "        Component Value Date/Time    CALCIUM 9.6 06/02/2022 0854    ALKPHOS 188 (H) 06/13/2022 1217    AST 82 (H) 06/13/2022 1217     (H) 06/13/2022 1217    BILITOT 0.6 06/13/2022 1217    ESTGFRAFRICA >60.0 06/02/2022 0854    EGFRNONAA >60.0 06/02/2022 0854          Lab Results   Component Value Date    TSH 2.561 06/02/2022     No results found for: INR, PROTIME  Lab Results   Component Value Date    WBC 3.56 (L) 06/02/2022    HGB 12.9 06/02/2022    HCT 41.2 06/02/2022    MCV 98 06/02/2022     06/02/2022     BMP  Sodium   Date Value Ref Range Status   06/02/2022 138 136 - 145 mmol/L Final     Potassium   Date Value Ref Range Status   06/02/2022 4.3 3.5 - 5.1 mmol/L Final     Chloride   Date Value Ref Range Status   06/02/2022 102 95 - 110 mmol/L Final     CO2   Date Value Ref Range Status   06/02/2022 25 23 - 29 mmol/L Final     BUN   Date Value Ref Range Status   06/02/2022 20 6 - 20 mg/dL Final     Creatinine   Date Value Ref Range Status   06/02/2022 0.7 0.5 - 1.4 mg/dL Final     Calcium   Date Value Ref Range Status   06/02/2022 9.6 8.7 - 10.5 mg/dL Final     Anion Gap   Date Value Ref Range Status   06/02/2022 11 8 - 16 mmol/L Final     eGFR if    Date Value Ref Range Status   06/02/2022 >60.0 >60 mL/min/1.73 m^2 Final     eGFR if non    Date Value Ref Range Status   06/02/2022 >60.0 >60 mL/min/1.73 m^2 Final     Comment:     Calculation used to obtain the estimated glomerular filtration  rate (eGFR) is the CKD-EPI equation.        CrCl cannot be calculated (Patient's most recent lab result is older than the maximum 7 days allowed.).    Assessment:     1. Spontaneous dissection of coronary artery    2. ST elevation myocardial infarction involving left anterior descending (LAD) coronary artery    3. Abnormal EKG    4. Elevated LFTs      ASYMPTOMATIC S/P SCAD DISTAL LAD WITH GOOD FUNCTIONAL CAPACITY NO CHANGE ON EKG NO CONTRAINDICATION TO PROCEED WITH SURGERY.CONTINUE  CURRENT MEDICAL THERAPY.  ELEVATED LFTS SECONDARY TO STAIN AGREE WITH CESSATION THERAPY  RESUME LOWER DOSE AFTER LFT NORMALIZE.  FIBROMUSCULAR DYSPLASIA ASYMPTOMATIC MONITOR BP LOW SALT DIET     I HAVE REVIEWED HER ANGIOS FROM THE GENERAL SHE HAS NORMAL CORONARIES EXCEPT A DISSECTION OF DISTAL LAD   Plan:   OK TO PROCEED WITH SURGERY MODERATE RISK   KEEP SCHEDULED APPOINTMENT  LOW FAT LOW SALT DIET    WEIGHT LOSS   EXERCISE   SWITCH TO LONG ACTING METOPROLOL FOR COMPLIANCE.       DISPLAY PLAN FREE TEXT

## 2022-07-07 DIAGNOSIS — I25.10 CORONARY ARTERY DISEASE INVOLVING NATIVE CORONARY ARTERY OF NATIVE HEART WITHOUT ANGINA PECTORIS: Primary | ICD-10-CM

## 2022-07-10 ENCOUNTER — PATIENT MESSAGE (OUTPATIENT)
Dept: GYNECOLOGIC ONCOLOGY | Facility: CLINIC | Age: 57
End: 2022-07-10
Payer: COMMERCIAL

## 2022-07-25 ENCOUNTER — LAB VISIT (OUTPATIENT)
Dept: LAB | Facility: HOSPITAL | Age: 57
End: 2022-07-25
Attending: COLON & RECTAL SURGERY
Payer: COMMERCIAL

## 2022-07-25 DIAGNOSIS — Z85.038 PERSONAL HISTORY OF COLON CANCER: ICD-10-CM

## 2022-07-25 DIAGNOSIS — R74.8 ELEVATED LIVER ENZYMES: ICD-10-CM

## 2022-07-25 PROCEDURE — 82378 CARCINOEMBRYONIC ANTIGEN: CPT | Performed by: COLON & RECTAL SURGERY

## 2022-07-25 PROCEDURE — 36415 COLL VENOUS BLD VENIPUNCTURE: CPT | Mod: PO | Performed by: COLON & RECTAL SURGERY

## 2022-07-25 PROCEDURE — 80053 COMPREHEN METABOLIC PANEL: CPT | Performed by: FAMILY MEDICINE

## 2022-07-26 LAB
ALBUMIN SERPL BCP-MCNC: 4.3 G/DL (ref 3.5–5.2)
ALP SERPL-CCNC: 146 U/L (ref 55–135)
ALT SERPL W/O P-5'-P-CCNC: 103 U/L (ref 10–44)
ANION GAP SERPL CALC-SCNC: 8 MMOL/L (ref 8–16)
AST SERPL-CCNC: 48 U/L (ref 10–40)
BILIRUB SERPL-MCNC: 0.5 MG/DL (ref 0.1–1)
BUN SERPL-MCNC: 15 MG/DL (ref 6–20)
CALCIUM SERPL-MCNC: 9.4 MG/DL (ref 8.7–10.5)
CEA SERPL-MCNC: <1.7 NG/ML (ref 0–5)
CHLORIDE SERPL-SCNC: 102 MMOL/L (ref 95–110)
CO2 SERPL-SCNC: 28 MMOL/L (ref 23–29)
CREAT SERPL-MCNC: 0.8 MG/DL (ref 0.5–1.4)
EST. GFR  (AFRICAN AMERICAN): >60 ML/MIN/1.73 M^2
EST. GFR  (NON AFRICAN AMERICAN): >60 ML/MIN/1.73 M^2
GLUCOSE SERPL-MCNC: 95 MG/DL (ref 70–110)
POTASSIUM SERPL-SCNC: 4 MMOL/L (ref 3.5–5.1)
PROT SERPL-MCNC: 7.1 G/DL (ref 6–8.4)
SODIUM SERPL-SCNC: 138 MMOL/L (ref 136–145)

## 2022-07-27 ENCOUNTER — PATIENT MESSAGE (OUTPATIENT)
Dept: INTERNAL MEDICINE | Facility: CLINIC | Age: 57
End: 2022-07-27
Payer: COMMERCIAL

## 2022-07-27 DIAGNOSIS — R74.8 ELEVATED LIVER ENZYMES: Primary | ICD-10-CM

## 2022-08-09 ENCOUNTER — HOSPITAL ENCOUNTER (OUTPATIENT)
Dept: RADIOLOGY | Facility: HOSPITAL | Age: 57
Discharge: HOME | End: 2022-08-09
Attending: FAMILY MEDICINE
Payer: COMMERCIAL

## 2022-08-09 DIAGNOSIS — I77.3 FIBROMUSCULAR DYSPLASIA OF BILATERAL RENAL ARTERIES: ICD-10-CM

## 2022-08-09 PROCEDURE — 93975 US RENAL ARTERY STENOSIS HYPERTEN (XPD): ICD-10-PCS | Mod: 26,,, | Performed by: RADIOLOGY

## 2022-08-09 PROCEDURE — 76770 US RENAL ARTERY STENOSIS HYPERTEN (XPD): ICD-10-PCS | Mod: 26,XS,, | Performed by: RADIOLOGY

## 2022-08-09 PROCEDURE — 93975 VASCULAR STUDY: CPT | Mod: 26,,, | Performed by: RADIOLOGY

## 2022-08-09 PROCEDURE — 76770 US EXAM ABDO BACK WALL COMP: CPT | Mod: 26,XS,, | Performed by: RADIOLOGY

## 2022-08-09 PROCEDURE — 76770 US EXAM ABDO BACK WALL COMP: CPT | Mod: 59,TC

## 2022-08-16 ENCOUNTER — TELEPHONE (OUTPATIENT)
Dept: GYNECOLOGIC ONCOLOGY | Facility: CLINIC | Age: 57
End: 2022-08-16
Payer: COMMERCIAL

## 2022-08-16 DIAGNOSIS — Z01.818 PRE-OP TESTING: ICD-10-CM

## 2022-08-16 DIAGNOSIS — N83.209 CYST OF OVARY, UNSPECIFIED LATERALITY: Primary | ICD-10-CM

## 2022-08-16 NOTE — TELEPHONE ENCOUNTER
Dr Noel sent navigator a message to scheduled pt for Sept 7th for robotic surgery combined with Dr Hannah. [facility] called. Paco confirmed that the robot was available and spot held until case request entered.     Pt contacted to confirm surgical date. LVM with direct navigator number for call back  Case request entered.

## 2022-08-16 NOTE — TELEPHONE ENCOUNTER
Pt called and updated on September 7th surgery coordinated with Dr Hannah. Pt verbalized that the date works for her. Pt informed that she would be contacted 1-2 days prior for further pre-op information. Pt verbalized understanding.

## 2022-08-17 NOTE — TELEPHONE ENCOUNTER
"Pt called and states that she saw on the portal that her procedure is scheduled for the removal of one ovary and fallopian tube. Pt states "Since I am post-menopause I was hoping we could just do a full hysterectomy at the same time". Pt updated that Dr Noel would be updated on her request so that we could obtain clarity prior to her procedure.   "

## 2022-08-23 ENCOUNTER — TELEPHONE (OUTPATIENT)
Dept: GYNECOLOGIC ONCOLOGY | Facility: CLINIC | Age: 57
End: 2022-08-23
Payer: COMMERCIAL

## 2022-08-23 ENCOUNTER — TELEPHONE (OUTPATIENT)
Dept: PREADMISSION TESTING | Facility: HOSPITAL | Age: 57
End: 2022-08-23
Payer: COMMERCIAL

## 2022-08-23 RX ORDER — CHOLECALCIFEROL (VITAMIN D3) 25 MCG
1000 TABLET ORAL DAILY
COMMUNITY
End: 2022-10-17

## 2022-08-23 NOTE — TELEPHONE ENCOUNTER
Pre op instructions reviewed with pt per phone: Spoke about pre op process and surgery instructions, verbalized understanding.    Surgery is scheduled on 9/7/22. Please arrive at 0530am. We will call you the afternoon prior to surgery to confirm arrival time, as it is subject to change due to cancellations & emergencies.    Please report to the [facility] (1st Floor) at [facility] located off of [facility] (2nd building on the left, in front of the flag pole).  Address: [address] [place] [address] [place]. [address]        INSTRUCTIONS IMPORTANT!!!  Do Not Eat, Drink, or Smoke after 12 midnight unless instructed otherwise by your Surgeon. OK to brush teeth, no gum, candy or mints!      *Take Only these medications with a small sip of water Morning of Surgery:  Metoprolol      ____  Stop all Aspirin products, Ibuprofen, Advil, Motrin & Aleve at least 5-7 days before surgery, unless otherwise instructed by your Physician office (May use Tylenol).  ____  Stop taking any Fish Oil supplements or Vitamins at least 5-7 days prior to surgery, unless instructed otherwise by your Physician office.  ____  NO Acrylic/fake nails or nail polish worn day of surgery (specifically hand/arm & foot surgeries).  ____  NO powder, lotions, deodorants, oils or creams on body.  ____  Remove jewelry & piercings prior to surgery.  ____  Dentures, Hearing Aids & Contact Lens will need to be removed prior to the start of surgery.  ____  Bring photo ID and insurance information to hospital (Leave Valuables at Home).  ____  If going home the same day, arrange for a ride home. You will not be able to drive 24 hrs if Anesthesia was used.   ____  Females (ages 11-60) may need to give a urine sample the morning of surgery; please see Pre op Nurse prior to using the restroom.  ____  Wear clean, loose fitting clothing. Allow for dressings, bandages.            Diabetic Patients: If you take diabetic medication, do NOT take morning of surgery unless  instructed by             Doctor. Metformin to be stopped 24 hrs prior to surgery time. DO NOT take long-acting insulin the evening before surgery. Blood sugars will be checked in pre-op by Nurse.    Bathing Instructions:    -Do not shave your face or body the day before or the day of surgery.  -Do not shave pubic hair 7 days prior to surgery (gyn pt's).   -Shower & Rinse your body as usual with anti-bacterial Soap (Dial, Lever 2000, or Hibiclens)   -Do not use Hibiclens on your head, face, or genitals.   -Do not wash with anti-bacterial soap after you use the Hibiclens.   -Rinse your body thoroughly.      [facility] Visitor/Ride Policy:  Only 2 adults allowed (over the age of 18) to accompany you to the Hospital. You Must have a ride home from a responsible adult that you know and trust. Medical Transport, Uber or Lyft can only be used if patient has a responsible adult to accompany them during ride home.    Post-Op Instructions: You will receive Post-op/Discharge instructions by your Discharge Nurse prior to going home. Please call your Surgeon's office with any post-surgery questions/concerns @ [phone].    *If you are running late or have questions the morning of surgery, please call the Surgery Dept @ [phone]  *Insurance/ Financial Questions, please call [phone].    Thank you,  -[facility] Pre Admit Testing Dept.  [phone] or [phone]  M-F 7:30 am-4 pm

## 2022-08-24 NOTE — TELEPHONE ENCOUNTER
Paco, updated that pt procedure will now be a RALH/BSO. CPT code provided. Paco verbalized that he would update the case request accordingly. Dr Hannah will still assist as part of the case.

## 2022-08-29 ENCOUNTER — LAB VISIT (OUTPATIENT)
Dept: LAB | Facility: HOSPITAL | Age: 57
End: 2022-08-29
Attending: FAMILY MEDICINE
Payer: COMMERCIAL

## 2022-08-29 DIAGNOSIS — R74.8 ELEVATED LIVER ENZYMES: ICD-10-CM

## 2022-08-29 DIAGNOSIS — Z01.818 PRE-OP TESTING: ICD-10-CM

## 2022-08-29 LAB
ALBUMIN SERPL BCP-MCNC: 4.2 G/DL (ref 3.5–5.2)
ALP SERPL-CCNC: 138 U/L (ref 55–135)
ALT SERPL W/O P-5'-P-CCNC: 58 U/L (ref 10–44)
ANION GAP SERPL CALC-SCNC: 10 MMOL/L (ref 8–16)
AST SERPL-CCNC: 32 U/L (ref 10–40)
BASOPHILS # BLD AUTO: 0.02 K/UL (ref 0–0.2)
BASOPHILS NFR BLD: 0.4 % (ref 0–1.9)
BILIRUB SERPL-MCNC: 0.4 MG/DL (ref 0.1–1)
BUN SERPL-MCNC: 16 MG/DL (ref 6–20)
CALCIUM SERPL-MCNC: 9.9 MG/DL (ref 8.7–10.5)
CHLORIDE SERPL-SCNC: 105 MMOL/L (ref 95–110)
CO2 SERPL-SCNC: 29 MMOL/L (ref 23–29)
CREAT SERPL-MCNC: 0.7 MG/DL (ref 0.5–1.4)
DIFFERENTIAL METHOD: NORMAL
EOSINOPHIL # BLD AUTO: 0.1 K/UL (ref 0–0.5)
EOSINOPHIL NFR BLD: 1.5 % (ref 0–8)
ERYTHROCYTE [DISTWIDTH] IN BLOOD BY AUTOMATED COUNT: 13.2 % (ref 11.5–14.5)
EST. GFR  (NO RACE VARIABLE): >60 ML/MIN/1.73 M^2
GLUCOSE SERPL-MCNC: 87 MG/DL (ref 70–110)
HCT VFR BLD AUTO: 39.8 % (ref 37–48.5)
HGB BLD-MCNC: 12.9 G/DL (ref 12–16)
IMM GRANULOCYTES # BLD AUTO: 0.01 K/UL (ref 0–0.04)
IMM GRANULOCYTES NFR BLD AUTO: 0.2 % (ref 0–0.5)
LYMPHOCYTES # BLD AUTO: 1.7 K/UL (ref 1–4.8)
LYMPHOCYTES NFR BLD: 35 % (ref 18–48)
MCH RBC QN AUTO: 30.8 PG (ref 27–31)
MCHC RBC AUTO-ENTMCNC: 32.4 G/DL (ref 32–36)
MCV RBC AUTO: 95 FL (ref 82–98)
MONOCYTES # BLD AUTO: 0.3 K/UL (ref 0.3–1)
MONOCYTES NFR BLD: 7.2 % (ref 4–15)
NEUTROPHILS # BLD AUTO: 2.6 K/UL (ref 1.8–7.7)
NEUTROPHILS NFR BLD: 55.7 % (ref 38–73)
NRBC BLD-RTO: 0 /100 WBC
PLATELET # BLD AUTO: 246 K/UL (ref 150–450)
PMV BLD AUTO: 9.5 FL (ref 9.2–12.9)
POTASSIUM SERPL-SCNC: 4.1 MMOL/L (ref 3.5–5.1)
PROT SERPL-MCNC: 7.6 G/DL (ref 6–8.4)
RBC # BLD AUTO: 4.19 M/UL (ref 4–5.4)
SODIUM SERPL-SCNC: 144 MMOL/L (ref 136–145)
WBC # BLD AUTO: 4.74 K/UL (ref 3.9–12.7)

## 2022-08-29 PROCEDURE — 85025 COMPLETE CBC W/AUTO DIFF WBC: CPT | Performed by: OBSTETRICS & GYNECOLOGY

## 2022-08-29 PROCEDURE — 36415 COLL VENOUS BLD VENIPUNCTURE: CPT | Mod: PO | Performed by: FAMILY MEDICINE

## 2022-08-29 PROCEDURE — 80053 COMPREHEN METABOLIC PANEL: CPT | Performed by: FAMILY MEDICINE

## 2022-08-29 NOTE — TELEPHONE ENCOUNTER
Spoke with patient. She desires total hysterectomy/bso as opposed to USO. I counseled her regarding morbidity of major versus minor surgery. She desires to proceed with RTLH/BSO. Consents to be signed the morning of surgery.     The risks, benefits, and indications of the procedure were discussed with the patient and her family members if present.  These included bleeding, transfusion, infection, damage to surrounding tissues (bowel, bladder, ureter), wound separation, lymphedema, conversion to laparotomy if laparoscopic, perioperative cardiac events, VTE, pneumonia, and possible death.   She voiced understanding, all questions were answered.

## 2022-08-31 ENCOUNTER — PATIENT MESSAGE (OUTPATIENT)
Dept: INTERNAL MEDICINE | Facility: CLINIC | Age: 57
End: 2022-08-31
Payer: COMMERCIAL

## 2022-08-31 DIAGNOSIS — R74.8 ABNORMAL LIVER ENZYMES: Primary | ICD-10-CM

## 2022-09-06 ENCOUNTER — TELEPHONE (OUTPATIENT)
Dept: PREADMISSION TESTING | Facility: HOSPITAL | Age: 57
End: 2022-09-06
Payer: COMMERCIAL

## 2022-09-06 ENCOUNTER — ANESTHESIA EVENT (OUTPATIENT)
Dept: SURGERY | Facility: HOSPITAL | Age: 57
End: 2022-09-06
Payer: COMMERCIAL

## 2022-09-06 DIAGNOSIS — R19.00 PELVIC MASS: ICD-10-CM

## 2022-09-06 DIAGNOSIS — N83.209 CYST OF OVARY, UNSPECIFIED LATERALITY: Primary | ICD-10-CM

## 2022-09-06 RX ORDER — MUPIROCIN 20 MG/G
OINTMENT TOPICAL
Status: CANCELLED | OUTPATIENT
Start: 2022-09-06

## 2022-09-06 RX ORDER — SODIUM CHLORIDE 9 MG/ML
INJECTION, SOLUTION INTRAVENOUS CONTINUOUS
Status: CANCELLED | OUTPATIENT
Start: 2022-09-06

## 2022-09-06 RX ORDER — LIDOCAINE HYDROCHLORIDE 10 MG/ML
1 INJECTION, SOLUTION EPIDURAL; INFILTRATION; INTRACAUDAL; PERINEURAL ONCE
Status: CANCELLED | OUTPATIENT
Start: 2022-09-06 | End: 2022-09-06

## 2022-09-06 NOTE — ANESTHESIA PREPROCEDURE EVALUATION
09/06/2022  Jhoana Sierra is a 57 y.o., female.    Pre-op Assessment    I have reviewed the Patient Summary Reports.    I have reviewed the Nursing Notes. I have reviewed the NPO Status.   I have reviewed the Medications.     Review of Systems  Anesthesia Hx:  No problems with previous Anesthesia Denies Hx of Anesthetic complications  Denies Family Hx of Anesthesia complications.   Denies Personal Hx of Anesthesia complications.   Social:  Non-Smoker, No Alcohol Use    Cardiovascular:  Cardiovascular Normal  ECG has been reviewed.    Pulmonary:  Pulmonary Normal    Renal/:  Renal/ Normal     Hepatic/GI:  Hepatic/GI Normal    Neurological:  Neurology Normal    Endocrine:  Endocrine Normal    Psych:  Psychiatric Normal         Patient Active Problem List   Diagnosis    Encounter for screening colonoscopy    Positive FIT (fecal immunochemical test)    Adenocarcinoma of colon    Colon adenocarcinoma    Lymphocytic colitis    Spontaneous dissection of coronary artery    ST elevation myocardial infarction (STEMI)    Fibromuscular dysplasia    Elevated LFTs    Abnormal EKG     Past Surgical History:   Procedure Laterality Date    COLONOSCOPY N/A 8/25/2020    Procedure: COLONOSCOPY;  Surgeon: Rianna Dillon MD;  Location: [facility];  Service: Endoscopy;  Laterality: N/A;    COLONOSCOPY N/A 1/7/2022    Procedure: COLONOSCOPY;  Surgeon: Ziggy Hannah MD;  Location: [facility];  Service: General;  Laterality: N/A;    FLEXIBLE SIGMOIDOSCOPY N/A 10/8/2020    Procedure: SIGMOIDOSCOPY, FLEXIBLE;  Surgeon: Ziggy Hannah MD;  Location: [facility];  Service: General;  Laterality: N/A;    INJECTION OF ANESTHETIC AGENT INTO TISSUE PLANE DEFINED BY TRANSVERSUS ABDOMINIS MUSCLE N/A 10/8/2020    Procedure: BLOCK, TRANSVERSUS ABDOMINIS PLANE;  Surgeon: Ziggy Hannah MD;  Location: [facility];  Service:  General;  Laterality: N/A;    LAPAROSCOPIC SIGMOIDECTOMY N/A 10/8/2020    Procedure: COLECTOMY, SIGMOID, LAPAROSCOPIC;  Surgeon: Ziggy Hannah MD;  Location: [facility] OR;  Service: General;  Laterality: N/A;  lower anterior resection  Proctosigmoidectomy    LAPAROTOMY N/A 10/8/2020    Procedure: LAPAROTOMY;  Surgeon: Ziggy Hannah MD;  Location: [facility] OR;  Service: General;  Laterality: N/A;    OVARIAN CYST REMOVAL      WISDOM TOOTH EXTRACTION           Physical Exam  General:  Well nourished      Airway/Jaw/Neck:  Airway Findings: Mouth Opening: Normal   Tongue: Normal   General Airway Assessment: Adult Mallampati: II  TM Distance: 4 - 6 cm   Jaw/Neck Findings:  Neck ROM: Normal ROM       Dental:  Dental Findings: In tact     Chest/Lungs:  Chest/Lungs Findings: Clear to auscultation, Normal Respiratory Rate      Heart/Vascular:  Heart Findings: Rate: Normal  Rhythm: Regular Rhythm  Sounds: Normal        Mental Status:  Mental Status Findings:  Cooperative, Alert and Oriented       Lab Results   Component Value Date    WBC 4.74 08/29/2022    HGB 12.9 08/29/2022    HCT 39.8 08/29/2022    MCV 95 08/29/2022     08/29/2022         Chemistry        Component Value Date/Time     08/29/2022 0855    K 4.1 08/29/2022 0855     08/29/2022 0855    CO2 29 08/29/2022 0855    BUN 16 08/29/2022 0855    CREATININE 0.7 08/29/2022 0855    GLU 87 08/29/2022 0855        Component Value Date/Time    CALCIUM 9.9 08/29/2022 0855    ALKPHOS 138 (H) 08/29/2022 0855    AST 32 08/29/2022 0855    ALT 58 (H) 08/29/2022 0855    BILITOT 0.4 08/29/2022 0855    ESTGFRAFRICA >60.0 07/25/2022 1037    EGFRNONAA >60.0 07/25/2022 1037            Anesthesia Plan  Type of Anesthesia, risks & benefits discussed:  Anesthesia Type:  general    Patient's Preference:   Plan Factors:          Intra-op Monitoring Plan: standard ASA monitors  Intra-op Monitoring Plan Comments:   Post Op Pain Control Plan: per primary service following  discharge from PACU  Post Op Pain Control Plan Comments:     Induction:   IV  Beta Blocker:  Patient is not currently on a Beta-Blocker (No further documentation required).       Informed Consent: Informed consent signed with the Patient and all parties understand the risks and agree with anesthesia plan.  All questions answered.  Anesthesia consent signed with patient.  ASA Score: 2     Day of Surgery Review of History & Physical: I have interviewed and examined the patient. I have reviewed the patient's H&P dated:  There are no significant changes.            Ready For Surgery From Anesthesia Perspective.           Physical Exam  General: Well nourished    Airway:  Mallampati: II   Mouth Opening: Normal  TM Distance: 4 - 6 cm  Tongue: Normal  Neck ROM: Normal ROM    Dental:  In tact    Chest/Lungs:  Clear to auscultation, Normal Respiratory Rate    Heart:  Rate: Normal  Rhythm: Regular Rhythm  Sounds: Normal          Anesthesia Plan  Type of Anesthesia, risks & benefits discussed:    Anesthesia Type: general  Intra-op Monitoring Plan: standard ASA monitors  Post Op Pain Control Plan: per primary service following discharge from PACU  Induction:  IV  Informed Consent: Informed consent signed with the Patient and all parties understand the risks and agree with anesthesia plan.  All questions answered.   ASA Score: 2  Day of Surgery Review of History & Physical: I have interviewed and examined the patient. I have reviewed the patient's H&P dated:     Ready For Surgery From Anesthesia Perspective.       .    Sinus bradycardia   Cannot rule out Anterior infarct ,age undetermined   Abnormal ECG   When compared with ECG of 14-FEB-2022 16:03,   Nonspecific T wave abnormality, improved in Inferior leads   T wave inversion no longer evident in Anterior-lateral leads   Confirmed by CATA CHAVARRIA, YUMIKO ([id number]) on 7/7/2022 9:53:47 PM     Echo 4/18/18:    CONCLUSIONS     1 - No wall motion abnormalities.     2 - Normal left  ventricular systolic function (EF 60-65%).     3 - Normal left ventricular diastolic function.     4 - Normal right ventricular systolic function .     5 - The estimated PA systolic pressure is greater than 28 mmHg.     6 - Mild tricuspid regurgitation.

## 2022-09-06 NOTE — H&P
Patient ID: Jhoana Sierra is a 57 y.o. female.     Chief Complaint: Consult        HPI  Referred by Dr. Hannah for pelvic mass and coordination of care locally here in [place].     Pelvic US   FINDINGS:  Uterus:  Size: 6.9 x 2.5 x 4.2 cm  Endometrium: The endometrium measures 6.5 mm in thickness.  No discrete endometrial lesion.  Right ovary: Size: 5.5 x 2.9 x 3.6 cm  Appearance: There is a mildly complex cyst arising from the right ovary measuring 5.1 x 2.7 x 3.8 cm which corresponds to the lesion seen on recent CT exam.  This cyst contains an internal septation.  No focal nodular component within this cyst.  Left ovary: Not visualized secondary to adjacent bowel gas.  Free Fluid: None.      12.4, not elevated.      CT for prior comparison 2020: There is a right adnexal cystic lesion seen which measures 3.2 cm.     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 Currently on ASA. S/p heart cath.   She is overall asymptomatic from a pelvic mass standpoint.      Prior abdominal surgeries include laparoscopic sigmoid colon resection with Dr. Hannah for colon cancer, ovarian cystectomy in  for what she says was a dermoid cyst of the ovary/unsure which side vix pfannenstiel.  x 2.       Denies postmenopausal bleeding. Dr. Mederos is her primary ob/gyn.   Review of Systems   Constitutional: Negative for appetite change, chills, fatigue and fever.   HENT: Negative for mouth sores.    Respiratory: Negative for cough and shortness of breath.    Cardiovascular: Negative for leg swelling.   Gastrointestinal: Negative for abdominal pain, blood in stool, constipation and diarrhea.   Endocrine: Negative for cold intolerance.   Genitourinary: Negative for dysuria and vaginal bleeding.   Musculoskeletal: Negative for myalgias.   Skin: Negative for rash.   Allergic/Immunologic: Negative.    Neurological: Negative for weakness and numbness.    Hematological: Negative for adenopathy. Does not bruise/bleed easily.   Psychiatric/Behavioral: Negative for confusion.              Past Medical History:   Diagnosis Date    Adenocarcinoma of colon      Colon cancer 2020    Heart attack 01/22/2022    PONV (postoperative nausea and vomiting)              Past Surgical History:   Procedure Laterality Date    COLONOSCOPY N/A 8/25/2020     Procedure: COLONOSCOPY;  Surgeon: Rianna Dillon MD;  Location: [facility] ENDO;  Service: Endoscopy;  Laterality: N/A;    COLONOSCOPY N/A 1/7/2022     Procedure: COLONOSCOPY;  Surgeon: Ziggy Hannah MD;  Location: [facility] ENDO;  Service: General;  Laterality: N/A;    FLEXIBLE SIGMOIDOSCOPY N/A 10/8/2020     Procedure: SIGMOIDOSCOPY, FLEXIBLE;  Surgeon: Ziggy Hannah MD;  Location: [facility] OR;  Service: General;  Laterality: N/A;    INJECTION OF ANESTHETIC AGENT INTO TISSUE PLANE DEFINED BY TRANSVERSUS ABDOMINIS MUSCLE N/A 10/8/2020     Procedure: BLOCK, TRANSVERSUS ABDOMINIS PLANE;  Surgeon: Ziggy Hannah MD;  Location: [facility] OR;  Service: General;  Laterality: N/A;    LAPAROSCOPIC SIGMOIDECTOMY N/A 10/8/2020     Procedure: COLECTOMY, SIGMOID, LAPAROSCOPIC;  Surgeon: Ziggy Hannah MD;  Location: [facility] OR;  Service: General;  Laterality: N/A;  lower anterior resection  Proctosigmoidectomy    LAPAROTOMY N/A 10/8/2020     Procedure: LAPAROTOMY;  Surgeon: Ziggy Hannah MD;  Location: [facility] OR;  Service: General;  Laterality: N/A;    OVARIAN CYST REMOVAL        WISDOM TOOTH EXTRACTION                Family History   Problem Relation Age of Onset    Fibromyalgia Sister      Diabetes Neg Hx      Heart disease Neg Hx        Social History               Socioeconomic History    Marital status:     Number of children: 2   Occupational History    Occupation: Not working currently   Tobacco Use    Smoking status: Never Smoker    Smokeless tobacco: Never Used   Substance and Sexual Activity    Alcohol use: Yes        Comment: Occ use; HOLD 72HRS PRIOR TO SURGERY    Drug use: No    Sexual activity: Yes       Partners: Male       Birth control/protection: Post-menopausal      Social Determinants of Health          Financial Resource Strain: Low Risk     Difficulty of Paying Living Expenses: Not hard at all   Food Insecurity: No Food Insecurity    Worried About Running Out of Food in the Last Year: Never true    Ran Out of Food in the Last Year: Never true   Transportation Needs: No Transportation Needs    Lack of Transportation (Medical): No    Lack of Transportation (Non-Medical): No   Physical Activity: Insufficiently Active    Days of Exercise per Week: 3 days    Minutes of Exercise per Session: 30 min   Stress: No Stress Concern Present    Feeling of Stress : Not at all   Social Connections: Unknown    Frequency of Communication with Friends and Family: More than three times a week    Frequency of Social Gatherings with Friends and Family: More than three times a week    Active Member of Clubs or Organizations: No    Attends Club or Organization Meetings: Never    Marital Status:    Housing Stability: Low Risk     Unable to Pay for Housing in the Last Year: No    Number of Places Lived in the Last Year: 1    Unstable Housing in the Last Year: No              Current Outpatient Medications   Medication Sig    aspirin 81 mg Cap 81 MG  .    atorvastatin (LIPITOR) 40 MG tablet Take 1 tablet (40 mg total) by mouth every evening.    co-enzyme Q-10 50 mg capsule Take 100 mg by mouth once daily.    meloxicam (MOBIC) 15 MG tablet TAKE 1 TABLET BY MOUTH ONCE DAILY AS NEEDED FOR PAIN    metoprolol tartrate (LOPRESSOR) 25 MG tablet Take 1 tablet (25 mg total) by mouth 2 (two) times daily.    tiZANidine (ZANAFLEX) 4 MG tablet Take 1 tablet (4 mg total) by mouth 3 (three) times daily as needed (muscle spasm).      No current facility-administered medications for this visit.      Review of patient's allergies indicates:  No Known  Allergies     Objective:   Physical Exam:   Constitutional: She is oriented to person, place, and time. She appears well-developed and well-nourished.    HENT:   Head: Normocephalic and atraumatic.    Eyes: Pupils are equal, round, and reactive to light. EOM are normal.    Neck: No thyromegaly present.    Cardiovascular: Normal rate, regular rhythm and intact distal pulses.     Pulmonary/Chest: Effort normal and breath sounds normal. No respiratory distress. She has no wheezes.         Abdominal: Soft. Bowel sounds are normal. She exhibits no distension and no mass. There is no abdominal tenderness.             Musculoskeletal: Normal range of motion and moves all extremeties.      Lymphadenopathy:     She has no cervical adenopathy.        Right: No supraclavicular adenopathy present.        Left: No supraclavicular adenopathy present.    Neurological: She is alert and oriented to person, place, and time.    Skin: Skin is warm and dry. No rash noted.    Psychiatric: She has a normal mood and affect.         Assessment:      1. Cyst of ovary, unspecified laterality          Plan:   No orders of the defined types were placed in this encounter.      I discussed with the patient the differential diagnosis for pelvic mass in a post menopausal woman including benign, borderline and malignant etiologies.  is normal and imaging characteristics are minimally complex. The cyst has been present for several years, however is slightly larger over serial imaging. She is aware definitive diagnosis can only be made with surgical resection. I have recommended surgical exploration and removal. She is a candidate for minimally invasive approach.      Plan for coordination of care and surgery here in [place] with Dr. Hannah.      Recent MI and heart cath so will await cardiology evaluation for perioperative risk assessment prior to scheduling surgery. Cardiology clearance received.     She desires total hysterectomy/bso as  opposed to USO. I counseled her regarding morbidity of major versus minor surgery. She desires to proceed with RTLH/BSO. Consents to be signed the morning of surgery.      The risks, benefits, and indications of the procedure were discussed with the patient and her family members if present.  These included bleeding, transfusion, infection, damage to surrounding tissues (bowel, bladder, ureter), wound separation, lymphedema, conversion to laparotomy if laparoscopic, perioperative cardiac events, VTE, pneumonia, and possible death.   She voiced understanding, all questions were answered.

## 2022-09-07 ENCOUNTER — HOSPITAL ENCOUNTER (OUTPATIENT)
Facility: HOSPITAL | Age: 57
Discharge: HOME | End: 2022-09-07
Attending: OBSTETRICS & GYNECOLOGY | Admitting: OBSTETRICS & GYNECOLOGY
Payer: COMMERCIAL

## 2022-09-07 ENCOUNTER — ANESTHESIA (OUTPATIENT)
Dept: SURGERY | Facility: HOSPITAL | Age: 57
End: 2022-09-07
Payer: COMMERCIAL

## 2022-09-07 DIAGNOSIS — N83.209 CYST OF OVARY, UNSPECIFIED LATERALITY: ICD-10-CM

## 2022-09-07 DIAGNOSIS — R19.00 PELVIC MASS: ICD-10-CM

## 2022-09-07 DIAGNOSIS — N83.209 CYST OF OVARY, UNSPECIFIED LATERALITY: Primary | ICD-10-CM

## 2022-09-07 PROBLEM — Z85.038 PERSONAL HISTORY OF COLON CANCER: Status: ACTIVE | Noted: 2022-09-07

## 2022-09-07 LAB
ABO + RH BLD: NORMAL
B-HCG UR QL: NEGATIVE
BLD GP AB SCN CELLS X3 SERPL QL: NORMAL
CTP QC/QA: YES
POCT GLUCOSE: 119 MG/DL (ref 70–110)

## 2022-09-07 PROCEDURE — 63600175 PHARM REV CODE 636 W HCPCS: Performed by: NURSE ANESTHETIST, CERTIFIED REGISTERED

## 2022-09-07 PROCEDURE — 36000713 HC OR TIME LEV V EA ADD 15 MIN: Performed by: OBSTETRICS & GYNECOLOGY

## 2022-09-07 PROCEDURE — 58571 PR LAPAROSCOPY W TOT HYSTERECTUTERUS <=250 GRAM  W TUBE/OVARY: ICD-10-PCS | Mod: ,,, | Performed by: OBSTETRICS & GYNECOLOGY

## 2022-09-07 PROCEDURE — 86901 BLOOD TYPING SEROLOGIC RH(D): CPT | Performed by: OBSTETRICS & GYNECOLOGY

## 2022-09-07 PROCEDURE — 58571 PR LAPAROSCOPY W TOT HYSTERECTUTERUS <=250 GRAM  W TUBE/OVARY: ICD-10-PCS | Mod: 80,,, | Performed by: COLON & RECTAL SURGERY

## 2022-09-07 PROCEDURE — 37000008 HC ANESTHESIA 1ST 15 MINUTES: Performed by: OBSTETRICS & GYNECOLOGY

## 2022-09-07 PROCEDURE — 37000009 HC ANESTHESIA EA ADD 15 MINS: Performed by: OBSTETRICS & GYNECOLOGY

## 2022-09-07 PROCEDURE — 88307 TISSUE EXAM BY PATHOLOGIST: CPT | Performed by: PATHOLOGY

## 2022-09-07 PROCEDURE — 63600175 PHARM REV CODE 636 W HCPCS: Performed by: ANESTHESIOLOGY

## 2022-09-07 PROCEDURE — 71000033 HC RECOVERY, INTIAL HOUR: Performed by: OBSTETRICS & GYNECOLOGY

## 2022-09-07 PROCEDURE — 25000003 PHARM REV CODE 250: Performed by: NURSE ANESTHETIST, CERTIFIED REGISTERED

## 2022-09-07 PROCEDURE — 63600175 PHARM REV CODE 636 W HCPCS: Performed by: OBSTETRICS & GYNECOLOGY

## 2022-09-07 PROCEDURE — 58571 TLH W/T/O 250 G OR LESS: CPT | Mod: 80,,, | Performed by: COLON & RECTAL SURGERY

## 2022-09-07 PROCEDURE — 88307 TISSUE EXAM BY PATHOLOGIST: CPT | Mod: 26,,, | Performed by: PATHOLOGY

## 2022-09-07 PROCEDURE — 27201423 OPTIME MED/SURG SUP & DEVICES STERILE SUPPLY: Performed by: OBSTETRICS & GYNECOLOGY

## 2022-09-07 PROCEDURE — 58571 TLH W/T/O 250 G OR LESS: CPT | Mod: ,,, | Performed by: OBSTETRICS & GYNECOLOGY

## 2022-09-07 PROCEDURE — 88307 PR  SURG PATH,LEVEL V: ICD-10-PCS | Mod: 26,,, | Performed by: PATHOLOGY

## 2022-09-07 PROCEDURE — 71000015 HC POSTOP RECOV 1ST HR: Performed by: OBSTETRICS & GYNECOLOGY

## 2022-09-07 PROCEDURE — 71000039 HC RECOVERY, EACH ADD'L HOUR: Performed by: OBSTETRICS & GYNECOLOGY

## 2022-09-07 PROCEDURE — 81025 URINE PREGNANCY TEST: CPT | Performed by: OBSTETRICS & GYNECOLOGY

## 2022-09-07 PROCEDURE — 36000712 HC OR TIME LEV V 1ST 15 MIN: Performed by: OBSTETRICS & GYNECOLOGY

## 2022-09-07 RX ORDER — SODIUM CHLORIDE, SODIUM LACTATE, POTASSIUM CHLORIDE, CALCIUM CHLORIDE 600; 310; 30; 20 MG/100ML; MG/100ML; MG/100ML; MG/100ML
INJECTION, SOLUTION INTRAVENOUS CONTINUOUS PRN
Status: DISCONTINUED | OUTPATIENT
Start: 2022-09-07 | End: 2022-09-07

## 2022-09-07 RX ORDER — PROCHLORPERAZINE EDISYLATE 5 MG/ML
5 INJECTION INTRAMUSCULAR; INTRAVENOUS EVERY 6 HOURS PRN
Status: DISCONTINUED | OUTPATIENT
Start: 2022-09-07 | End: 2022-09-07 | Stop reason: HOSPADM

## 2022-09-07 RX ORDER — SODIUM CHLORIDE 9 MG/ML
INJECTION, SOLUTION INTRAVENOUS CONTINUOUS
Status: DISCONTINUED | OUTPATIENT
Start: 2022-09-07 | End: 2022-09-07 | Stop reason: HOSPADM

## 2022-09-07 RX ORDER — DIPHENHYDRAMINE HYDROCHLORIDE 50 MG/ML
25 INJECTION INTRAMUSCULAR; INTRAVENOUS EVERY 4 HOURS PRN
Status: DISCONTINUED | OUTPATIENT
Start: 2022-09-07 | End: 2022-09-07 | Stop reason: HOSPADM

## 2022-09-07 RX ORDER — LIDOCAINE HYDROCHLORIDE 20 MG/ML
INJECTION INTRAVENOUS
Status: DISCONTINUED | OUTPATIENT
Start: 2022-09-07 | End: 2022-09-07

## 2022-09-07 RX ORDER — DEXAMETHASONE SODIUM PHOSPHATE 4 MG/ML
INJECTION, SOLUTION INTRA-ARTICULAR; INTRALESIONAL; INTRAMUSCULAR; INTRAVENOUS; SOFT TISSUE
Status: DISCONTINUED | OUTPATIENT
Start: 2022-09-07 | End: 2022-09-07

## 2022-09-07 RX ORDER — ONDANSETRON 8 MG/1
8 TABLET, ORALLY DISINTEGRATING ORAL EVERY 8 HOURS PRN
Status: DISCONTINUED | OUTPATIENT
Start: 2022-09-07 | End: 2022-09-07 | Stop reason: HOSPADM

## 2022-09-07 RX ORDER — MUPIROCIN 20 MG/G
OINTMENT TOPICAL
Status: DISCONTINUED | OUTPATIENT
Start: 2022-09-07 | End: 2022-09-07 | Stop reason: HOSPADM

## 2022-09-07 RX ORDER — MIDAZOLAM HYDROCHLORIDE 1 MG/ML
INJECTION, SOLUTION INTRAMUSCULAR; INTRAVENOUS
Status: DISCONTINUED | OUTPATIENT
Start: 2022-09-07 | End: 2022-09-07

## 2022-09-07 RX ORDER — HYDROMORPHONE HYDROCHLORIDE 2 MG/ML
0.2 INJECTION, SOLUTION INTRAMUSCULAR; INTRAVENOUS; SUBCUTANEOUS EVERY 5 MIN PRN
Status: DISCONTINUED | OUTPATIENT
Start: 2022-09-07 | End: 2022-09-07 | Stop reason: HOSPADM

## 2022-09-07 RX ORDER — PROPOFOL 10 MG/ML
VIAL (ML) INTRAVENOUS
Status: DISCONTINUED | OUTPATIENT
Start: 2022-09-07 | End: 2022-09-07

## 2022-09-07 RX ORDER — MAGNESIUM SULFATE HEPTAHYDRATE 500 MG/ML
INJECTION, SOLUTION INTRAMUSCULAR; INTRAVENOUS
Status: DISCONTINUED | OUTPATIENT
Start: 2022-09-07 | End: 2022-09-07

## 2022-09-07 RX ORDER — IBUPROFEN 200 MG
600 TABLET ORAL EVERY 6 HOURS PRN
Status: DISCONTINUED | OUTPATIENT
Start: 2022-09-07 | End: 2022-09-07 | Stop reason: HOSPADM

## 2022-09-07 RX ORDER — ONDANSETRON 2 MG/ML
INJECTION INTRAMUSCULAR; INTRAVENOUS
Status: DISCONTINUED | OUTPATIENT
Start: 2022-09-07 | End: 2022-09-07

## 2022-09-07 RX ORDER — HYDROCODONE BITARTRATE AND ACETAMINOPHEN 5; 325 MG/1; MG/1
1 TABLET ORAL EVERY 4 HOURS PRN
Status: DISCONTINUED | OUTPATIENT
Start: 2022-09-07 | End: 2022-09-07 | Stop reason: HOSPADM

## 2022-09-07 RX ORDER — CEFAZOLIN SODIUM 2 G/50ML
2 SOLUTION INTRAVENOUS
Status: DISCONTINUED | OUTPATIENT
Start: 2022-09-07 | End: 2022-09-07 | Stop reason: HOSPADM

## 2022-09-07 RX ORDER — LIDOCAINE HYDROCHLORIDE 10 MG/ML
1 INJECTION, SOLUTION EPIDURAL; INFILTRATION; INTRACAUDAL; PERINEURAL ONCE
Status: DISCONTINUED | OUTPATIENT
Start: 2022-09-07 | End: 2022-09-07 | Stop reason: HOSPADM

## 2022-09-07 RX ORDER — ACETAMINOPHEN 10 MG/ML
INJECTION, SOLUTION INTRAVENOUS
Status: DISCONTINUED | OUTPATIENT
Start: 2022-09-07 | End: 2022-09-07

## 2022-09-07 RX ORDER — CEFAZOLIN SODIUM 1 G/3ML
INJECTION, POWDER, FOR SOLUTION INTRAMUSCULAR; INTRAVENOUS
Status: DISCONTINUED | OUTPATIENT
Start: 2022-09-07 | End: 2022-09-07

## 2022-09-07 RX ORDER — DIPHENHYDRAMINE HCL 25 MG
25 CAPSULE ORAL EVERY 4 HOURS PRN
Status: DISCONTINUED | OUTPATIENT
Start: 2022-09-07 | End: 2022-09-07 | Stop reason: HOSPADM

## 2022-09-07 RX ORDER — SODIUM CHLORIDE 0.9 % (FLUSH) 0.9 %
10 SYRINGE (ML) INJECTION
Status: DISCONTINUED | OUTPATIENT
Start: 2022-09-07 | End: 2022-09-07 | Stop reason: HOSPADM

## 2022-09-07 RX ORDER — HYDROMORPHONE HYDROCHLORIDE 2 MG/ML
1 INJECTION, SOLUTION INTRAMUSCULAR; INTRAVENOUS; SUBCUTANEOUS EVERY 4 HOURS PRN
Status: DISCONTINUED | OUTPATIENT
Start: 2022-09-07 | End: 2022-09-07 | Stop reason: HOSPADM

## 2022-09-07 RX ORDER — ROCURONIUM BROMIDE 10 MG/ML
INJECTION, SOLUTION INTRAVENOUS
Status: DISCONTINUED | OUTPATIENT
Start: 2022-09-07 | End: 2022-09-07

## 2022-09-07 RX ORDER — OXYCODONE AND ACETAMINOPHEN 5; 325 MG/1; MG/1
1 TABLET ORAL EVERY 6 HOURS PRN
Qty: 30 TABLET | Refills: 0 | Status: SHIPPED | OUTPATIENT
Start: 2022-09-07 | End: 2022-10-17

## 2022-09-07 RX ORDER — IBUPROFEN 600 MG/1
600 TABLET ORAL EVERY 8 HOURS PRN
Qty: 30 TABLET | Refills: 0 | Status: SHIPPED | OUTPATIENT
Start: 2022-09-07 | End: 2022-10-17

## 2022-09-07 RX ORDER — OXYCODONE AND ACETAMINOPHEN 5; 325 MG/1; MG/1
1 TABLET ORAL
Status: DISCONTINUED | OUTPATIENT
Start: 2022-09-07 | End: 2022-09-07 | Stop reason: HOSPADM

## 2022-09-07 RX ORDER — DEXMEDETOMIDINE HYDROCHLORIDE 100 UG/ML
INJECTION, SOLUTION INTRAVENOUS
Status: DISCONTINUED | OUTPATIENT
Start: 2022-09-07 | End: 2022-09-07

## 2022-09-07 RX ADMIN — SODIUM CHLORIDE, SODIUM LACTATE, POTASSIUM CHLORIDE, AND CALCIUM CHLORIDE: 600; 310; 30; 20 INJECTION, SOLUTION INTRAVENOUS at 07:09

## 2022-09-07 RX ADMIN — MIDAZOLAM 2 MG: 1 INJECTION INTRAMUSCULAR; INTRAVENOUS at 06:09

## 2022-09-07 RX ADMIN — SUGAMMADEX 150 MG: 100 INJECTION, SOLUTION INTRAVENOUS at 09:09

## 2022-09-07 RX ADMIN — ONDANSETRON 4 MG: 2 INJECTION, SOLUTION INTRAMUSCULAR; INTRAVENOUS at 09:09

## 2022-09-07 RX ADMIN — LIDOCAINE HYDROCHLORIDE 100 MG: 20 INJECTION, SOLUTION INTRAVENOUS at 07:09

## 2022-09-07 RX ADMIN — GLYCOPYRROLATE 0.2 MG: 0.2 INJECTION, SOLUTION INTRAMUSCULAR; INTRAVENOUS at 08:09

## 2022-09-07 RX ADMIN — MAGNESIUM SULFATE HEPTAHYDRATE 2 G: 500 INJECTION, SOLUTION INTRAMUSCULAR; INTRAVENOUS at 07:09

## 2022-09-07 RX ADMIN — ROCURONIUM BROMIDE 80 MG: 10 INJECTION, SOLUTION INTRAVENOUS at 07:09

## 2022-09-07 RX ADMIN — PROPOFOL 150 MG: 10 INJECTION, EMULSION INTRAVENOUS at 07:09

## 2022-09-07 RX ADMIN — HYDROMORPHONE HYDROCHLORIDE 0.2 MG: 2 INJECTION INTRAMUSCULAR; INTRAVENOUS; SUBCUTANEOUS at 10:09

## 2022-09-07 RX ADMIN — DEXMEDETOMIDINE HYDROCHLORIDE 40 MCG: 100 INJECTION, SOLUTION, CONCENTRATE INTRAVENOUS at 07:09

## 2022-09-07 RX ADMIN — DEXMEDETOMIDINE HYDROCHLORIDE 10 MCG: 100 INJECTION, SOLUTION, CONCENTRATE INTRAVENOUS at 09:09

## 2022-09-07 RX ADMIN — CEFAZOLIN 2 G: 1 INJECTION, POWDER, FOR SOLUTION INTRAMUSCULAR; INTRAVENOUS at 07:09

## 2022-09-07 RX ADMIN — ACETAMINOPHEN 1000 MG: 10 INJECTION, SOLUTION INTRAVENOUS at 07:09

## 2022-09-07 RX ADMIN — DEXAMETHASONE SODIUM PHOSPHATE 8 MG: 4 INJECTION, SOLUTION INTRAMUSCULAR; INTRAVENOUS at 07:09

## 2022-09-07 RX ADMIN — PROCHLORPERAZINE EDISYLATE 5 MG: 5 INJECTION INTRAMUSCULAR; INTRAVENOUS at 09:09

## 2022-09-07 NOTE — H&P
[facility] ([facility])  Colorectal Surgery  History & Physical    Patient Name: Jhoana Sierra  MRN: [id number]  Admission Date: 9/7/2022  Attending Physician: Bela Noel MD   Primary Care Provider: Dhruv Nguyen MD    Patient information was obtained from patient and past medical records.     Subjective:     Chief Complaint/Reason for Admission: previous rectosigmoid adenocarcinoma    History of Present Illness: 58yo F with previous rectosigmoid adenocarcinoma who is s/p lap LAR on 10/8/2020 with final path showing Stage 1: T2N0 adenocarcinoma who presents for definitive surgical management of an enlarged ovarian cyst for robotic MIREYA. CRS available for possibly VIKA and assistance with pelvic inlet entry.      No current facility-administered medications on file prior to encounter.     Current Outpatient Medications on File Prior to Encounter   Medication Sig    aspirin 81 mg Cap 81 MG  .    metoprolol succinate (TOPROL-XL) 25 MG 24 hr tablet Take 1 tablet (25 mg total) by mouth once daily.    vitamin D (VITAMIN D3) 1000 units Tab Take 1,000 Units by mouth once daily.    atorvastatin (LIPITOR) 40 MG tablet Take 1 tablet (40 mg total) by mouth every evening. (Patient not taking: No sig reported)    co-enzyme Q-10 50 mg capsule Take 100 mg by mouth once daily.    multivitamin (THERAGRAN) per tablet Take 1 tablet by mouth once daily.       Review of patient's allergies indicates:  No Known Allergies    Past Medical History:   Diagnosis Date    Adenocarcinoma of colon     Colon cancer 2020    Heart attack 01/22/2022    PONV (postoperative nausea and vomiting)      Past Surgical History:   Procedure Laterality Date    COLONOSCOPY N/A 8/25/2020    Procedure: COLONOSCOPY;  Surgeon: Rianna Dillon MD;  Location: [facility];  Service: Endoscopy;  Laterality: N/A;    COLONOSCOPY N/A 1/7/2022    Procedure: COLONOSCOPY;  Surgeon: Ziggy Hannah MD;  Location: [facility];  Service: General;   Laterality: N/A;    FLEXIBLE SIGMOIDOSCOPY N/A 10/8/2020    Procedure: SIGMOIDOSCOPY, FLEXIBLE;  Surgeon: Ziggy Hannah MD;  Location: [facility] OR;  Service: General;  Laterality: N/A;    INJECTION OF ANESTHETIC AGENT INTO TISSUE PLANE DEFINED BY TRANSVERSUS ABDOMINIS MUSCLE N/A 10/8/2020    Procedure: BLOCK, TRANSVERSUS ABDOMINIS PLANE;  Surgeon: Ziggy Hannah MD;  Location: [facility] OR;  Service: General;  Laterality: N/A;    LAPAROSCOPIC SIGMOIDECTOMY N/A 10/8/2020    Procedure: COLECTOMY, SIGMOID, LAPAROSCOPIC;  Surgeon: Ziggy Hannah MD;  Location: [facility] OR;  Service: General;  Laterality: N/A;  lower anterior resection  Proctosigmoidectomy    LAPAROTOMY N/A 10/8/2020    Procedure: LAPAROTOMY;  Surgeon: Ziggy Hannah MD;  Location: [facility] OR;  Service: General;  Laterality: N/A;    OVARIAN CYST REMOVAL      WISDOM TOOTH EXTRACTION       Family History       Problem Relation (Age of Onset)    Fibromyalgia Sister          Tobacco Use    Smoking status: Never    Smokeless tobacco: Never   Substance and Sexual Activity    Alcohol use: Yes     Comment: Occ use; HOLD 72HRS PRIOR TO SURGERY    Drug use: No    Sexual activity: Yes     Partners: Male     Birth control/protection: Post-menopausal     Review of Systems   Constitutional:  Negative for activity change, appetite change, chills, fatigue, fever and unexpected weight change.   HENT:  Negative for congestion, ear pain, sore throat and trouble swallowing.    Eyes:  Negative for pain, redness and itching.   Respiratory:  Negative for cough, shortness of breath and wheezing.    Cardiovascular:  Negative for chest pain, palpitations and leg swelling.   Gastrointestinal:  Negative for abdominal distention, abdominal pain, anal bleeding, blood in stool, constipation, diarrhea, nausea, rectal pain and vomiting.   Endocrine: Negative for cold intolerance, heat intolerance and polyuria.   Genitourinary:  Negative for dysuria, flank pain, frequency and  hematuria.   Musculoskeletal:  Negative for gait problem, joint swelling and neck pain.   Skin:  Negative for color change, rash and wound.   Allergic/Immunologic: Negative for environmental allergies and immunocompromised state.   Neurological:  Negative for dizziness, speech difficulty, weakness and numbness.   Psychiatric/Behavioral:  Negative for agitation, confusion and hallucinations.    Objective:     Vital Signs (Most Recent):  Temp: 98.2 °F (36.8 °C) (09/07/22 0626)  Pulse: 63 (09/07/22 0626)  Resp: 18 (09/07/22 0626)  BP: (!) 160/77 (09/07/22 0626)  SpO2: 99 % (09/07/22 0626)   Vital Signs (24h Range):  Temp:  [98.2 °F (36.8 °C)] 98.2 °F (36.8 °C)  Pulse:  [63] 63  Resp:  [18] 18  SpO2:  [99 %] 99 %  BP: (160)/(77) 160/77     Weight: 77 kg (169 lb 12.1 oz)  Body mass index is 29.14 kg/m².    Physical Exam  Constitutional:       Appearance: She is well-developed.   HENT:      Head: Normocephalic and atraumatic.   Eyes:      Conjunctiva/sclera: Conjunctivae normal.   Neck:      Thyroid: No thyromegaly.   Cardiovascular:      Rate and Rhythm: Normal rate and regular rhythm.   Pulmonary:      Effort: Pulmonary effort is normal. No respiratory distress.   Abdominal:      General: There is no distension.      Palpations: Abdomen is soft. There is no mass.      Tenderness: There is no abdominal tenderness.      Comments: Well-healed port and extraction sites   Musculoskeletal:         General: No tenderness. Normal range of motion.      Cervical back: Normal range of motion.   Skin:     General: Skin is warm and dry.      Capillary Refill: Capillary refill takes less than 2 seconds.      Findings: No rash.   Neurological:      Mental Status: She is alert and oriented to person, place, and time.       Assessment/Plan:     Personal history of colon cancer  56yo F with previous rectosigmoid cancer now s/p lap LAR in 2020 who present for robotic LAR for ovarian cyst/mass    - Will be available to assist with any VIKA  and pelvic entry  - plan for diagnostic laparoscopy and any other indicated procedures  - All risks, benefits and alternatives fully explained to patient. Risks include, but are not limited to, bleeding, infection, anastomotic leak, damage to ureter, damage to other intra-abdominal organs such as colon, rectum, small bowel, stomach, liver, bladder, reproductive organs, sexual dysfunction, urinary dysfunction, postoperative abscess, conversion to open operation, perioperative MI, CVA and death.  All questions field and appropriately answered to patient's satisfaction.  Consent signed and placed on chart.        VTE Risk Mitigation (From admission, onward)         Ordered     IP VTE HIGH RISK PATIENT  Once         09/07/22 0551     Place sequential compression device  Until discontinued         09/07/22 0551     Place SEAN hose  Until discontinued         09/07/22 0551                Ziggy Hannah MD  Colorectal Surgery  '[place] [facility] ([facility])

## 2022-09-07 NOTE — ANESTHESIA PROCEDURE NOTES
Intubation    Date/Time: 9/7/2022 7:04 AM  Performed by: Kevin Mortensen CRNA  Authorized by: Otto Turpin II, MD     Intubation:     Induction:  Intravenous    Intubated:  Postinduction    Mask Ventilation:  Easy mask    Attempts:  1    Attempted By:  CRNA    Method of Intubation:  Direct    Blade:  Novak 2    Laryngeal View Grade: Grade I - full view of cords      Difficult Airway Encountered?: No      Complications:  None    Airway Device:  Oral endotracheal tube    Airway Device Size:  7.5    Style/Cuff Inflation:  Cuffed    Tube secured:  22    Secured at:  The lips    Placement Verified By:  Capnometry    Complicating Factors:  None    Findings Post-Intubation:  BS equal bilateral

## 2022-09-07 NOTE — TRANSFER OF CARE
"Anesthesia Transfer of Care Note    Patient: Jhoana Sierra    Procedure(s) Performed: Procedure(s) (LRB):  XI ROBOTIC HYSTERECTOMY,WITH SALPINGO-OOPHORECTOMY (Bilateral)  LYSIS, ADHESIONS, LAPAROSCOPIC  LAPAROSCOPY, DIAGNOSTIC (N/A)    Patient location: PACU    Anesthesia Type: general    Transport from OR: Transported from OR on room air with adequate spontaneous ventilation    Post pain: adequate analgesia    Post assessment: no apparent anesthetic complications and tolerated procedure well    Post vital signs: stable    Level of consciousness: awake and alert    Nausea/Vomiting: no nausea/vomiting    Complications: none    Transfer of care protocol was followed      Last vitals:   Visit Vitals  BP (!) 160/77 (BP Location: Right arm, Patient Position: Sitting)   Pulse 63   Temp 36.8 °C (98.2 °F) (Temporal)   Resp 18   Ht 5' 4" (1.626 m)   Wt 77 kg (169 lb 12.1 oz)   LMP 08/25/2017   SpO2 99%   Breastfeeding No   BMI 29.14 kg/m²     "

## 2022-09-07 NOTE — ASSESSMENT & PLAN NOTE
56yo F with previous rectosigmoid cancer now s/p lap LAR in 2020 who present for robotic LAR for ovarian cyst/mass    - Will be available to assist with any VIKA and pelvic entry  - plan for diagnostic laparoscopy and any other indicated procedures  - All risks, benefits and alternatives fully explained to patient. Risks include, but are not limited to, bleeding, infection, anastomotic leak, damage to ureter, damage to other intra-abdominal organs such as colon, rectum, small bowel, stomach, liver, bladder, reproductive organs, sexual dysfunction, urinary dysfunction, postoperative abscess, conversion to open operation, perioperative MI, CVA and death.  All questions field and appropriately answered to patient's satisfaction.  Consent signed and placed on chart.

## 2022-09-07 NOTE — ANESTHESIA POSTPROCEDURE EVALUATION
Anesthesia Post Evaluation    Patient: Jhoana Sierra    Procedure(s) Performed: Procedure(s) (LRB):  XI ROBOTIC HYSTERECTOMY,WITH SALPINGO-OOPHORECTOMY (Bilateral)  LYSIS, ADHESIONS, LAPAROSCOPIC  LAPAROSCOPY, DIAGNOSTIC (N/A)    Final Anesthesia Type: general      Patient location during evaluation: PACU  Patient participation: Yes- Able to Participate  Level of consciousness: awake and alert  Post-procedure vital signs: reviewed and stable  Pain management: adequate  Airway patency: patent  VALENTINO mitigation strategies: Verification of full reversal of neuromuscular block  PONV status at discharge: No PONV  Anesthetic complications: no      Cardiovascular status: hemodynamically stable  Respiratory status: spontaneous ventilation  Hydration status: euvolemic  Follow-up not needed.          Vitals Value Taken Time   /57 09/07/22 1043   Temp 36.4 °C (97.5 °F) 09/07/22 0930   Pulse 62 09/07/22 1043   Resp 34 09/07/22 1043   SpO2 88 % 09/07/22 1043   Vitals shown include unvalidated device data.      No case tracking events are documented in the log.      Pain/Jackson Score: Pain Rating Prior to Med Admin: 8 (9/7/2022 10:16 AM)  Jackson Score: 8 (9/7/2022 10:20 AM)

## 2022-09-07 NOTE — OP NOTE
DATE OF PROCEDURE:  9/7/2022     SURGEON:  Bela Noel M.D.   - Also present Dr. Ziggy Hannah (patient's colorectal surgeon)      PREOPERATIVE DIAGNOSIS:   1. Pelvic mass  2. Prior history of colon cancer      POSTOPERATIVE DIAGNOSES:    1. Pelvic mass   2. Prior history of colon cancer      PROCEDURE PERFORMED:  Robotic-assisted total laparoscopic hysterectomy, bilateral salpingo-oophorectomy     ANESTHESIA:  General endotracheal anesthesia.     SPECIMENS REMOVED:  1.  Uterus and cervix, bilateral tubes and ovaries     ESTIMATED BLOOD LOSS:  <20 mL.     COMPLICATIONS:  None.     FINDINGS: 4cm simple appearing right ovarian cyst, smooth capsule. Removed intact without spillage into the abdomen. 6cm uterus, normal. Normal cervix. Normal right fallopian tube and ovary. No ascites. No evidence of intraperitoneal disease. Small amount of omentum adhered to the umbilicus.        PROCEDURE IN DETAIL:  The patient was taken to the Operating Room.  Informed consent had been obtained.  She underwent general endotracheal anesthesia without difficulty, was prepped and draped in the normal sterile fashion in a dorsal lithotomy position.  Timeout was performed.  All parties agreed to the planned procedure.  Perioperative antibiotics were administered.  Cuevas catheter was placed under sterile conditions. The VCare uterine manipulator was then secured in place in a standard fashion for uterine manipulation. Gloves were changed and attention was turned to the patient's abdomen.       Along with Dr. Hannah the veress needle was inserted in the left upper quadrant. Abdomen was insufflated and pneumoperitoneum was obtained. Robotic trocar was introduced under direct visualization.  Additional trocars were placed, 2 robotic trocars to the left of the camera, 1 robotic trocar to the right of the camera and an additional 8 mm assist port to the right of the camera. Omentum was taken down from the anterior abdominal wall to allow for  visualization of the pelvis. The patient was placed in steep Trendelenburg. Robot was docked and operating surgeon reported to the console.       Survey of the abdomen and pelvis revealed the above findings. Bilateral round ligaments were identified. These were cauterized and transected. The posterior leaf of the broad ligament was then opened bilaterally facilitating access to the retroperitoneum. The bilateral infundibulopelvic ligaments were then skeletonized with good visualization of the ureter beneath. These were cauterized and transected.  The anterior leaf of the broad ligament was then opened circumferentially.  Proper plane for the bladder flap was identified and the bladder was gently reflected off of the anterior aspect of the uterus and cervix to the level of the cervicovaginal junction. Bilateral uterine arteries were then skeletonized.  These were cauterized and transected.  The remainder of the uterosacral and cardinal ligaments were then also serially cauterized and transected.  Posterior colpotomy was initiated in the 6 o'clock position and carried around circumferentially.  The uterus, cervix, bilateral fallopian tubes and ovaries were then removed through the vagina.        We then closed the vaginal cuff with a 2-0 V-Loc suture in a running fashion. Bilateral ureters were reinspected and both noted to be vermiculating equally and briskly. Pelvis was hemostatic. The robotic trocars were then removed under direct visualization and the robot was undocked. Skin incisions were then rendered hemostatic.  These were closed with 4-0 Monocryl in a subcuticular fashion and topped with sterile dressing. The patient tolerated the procedure well. Sponge, lap, needle and instrument counts were correct x2 as reported by the circulating nurse.  She was awakened from anesthesia and taken to recovery in stable condition.

## 2022-09-07 NOTE — SUBJECTIVE & OBJECTIVE
No current facility-administered medications on file prior to encounter.     Current Outpatient Medications on File Prior to Encounter   Medication Sig    aspirin 81 mg Cap 81 MG  .    metoprolol succinate (TOPROL-XL) 25 MG 24 hr tablet Take 1 tablet (25 mg total) by mouth once daily.    vitamin D (VITAMIN D3) 1000 units Tab Take 1,000 Units by mouth once daily.    atorvastatin (LIPITOR) 40 MG tablet Take 1 tablet (40 mg total) by mouth every evening. (Patient not taking: No sig reported)    co-enzyme Q-10 50 mg capsule Take 100 mg by mouth once daily.    multivitamin (THERAGRAN) per tablet Take 1 tablet by mouth once daily.       Review of patient's allergies indicates:  No Known Allergies    Past Medical History:   Diagnosis Date    Adenocarcinoma of colon     Colon cancer 2020    Heart attack 01/22/2022    PONV (postoperative nausea and vomiting)      Past Surgical History:   Procedure Laterality Date    COLONOSCOPY N/A 8/25/2020    Procedure: COLONOSCOPY;  Surgeon: Rianna Dillon MD;  Location: [facility];  Service: Endoscopy;  Laterality: N/A;    COLONOSCOPY N/A 1/7/2022    Procedure: COLONOSCOPY;  Surgeon: Ziggy Hannah MD;  Location: [facility];  Service: General;  Laterality: N/A;    FLEXIBLE SIGMOIDOSCOPY N/A 10/8/2020    Procedure: SIGMOIDOSCOPY, FLEXIBLE;  Surgeon: Ziggy Hannah MD;  Location: [facility] OR;  Service: General;  Laterality: N/A;    INJECTION OF ANESTHETIC AGENT INTO TISSUE PLANE DEFINED BY TRANSVERSUS ABDOMINIS MUSCLE N/A 10/8/2020    Procedure: BLOCK, TRANSVERSUS ABDOMINIS PLANE;  Surgeon: Ziggy Hannah MD;  Location: [facility] OR;  Service: General;  Laterality: N/A;    LAPAROSCOPIC SIGMOIDECTOMY N/A 10/8/2020    Procedure: COLECTOMY, SIGMOID, LAPAROSCOPIC;  Surgeon: Ziggy Hannah MD;  Location: [facility] OR;  Service: General;  Laterality: N/A;  lower anterior resection  Proctosigmoidectomy    LAPAROTOMY N/A 10/8/2020    Procedure: LAPAROTOMY;  Surgeon: Ziggy Hannah MD;   Location: [facility] OR;  Service: General;  Laterality: N/A;    OVARIAN CYST REMOVAL      WISDOM TOOTH EXTRACTION       Family History       Problem Relation (Age of Onset)    Fibromyalgia Sister          Tobacco Use    Smoking status: Never    Smokeless tobacco: Never   Substance and Sexual Activity    Alcohol use: Yes     Comment: Occ use; HOLD 72HRS PRIOR TO SURGERY    Drug use: No    Sexual activity: Yes     Partners: Male     Birth control/protection: Post-menopausal     Review of Systems   Constitutional:  Negative for activity change, appetite change, chills, fatigue, fever and unexpected weight change.   HENT:  Negative for congestion, ear pain, sore throat and trouble swallowing.    Eyes:  Negative for pain, redness and itching.   Respiratory:  Negative for cough, shortness of breath and wheezing.    Cardiovascular:  Negative for chest pain, palpitations and leg swelling.   Gastrointestinal:  Negative for abdominal distention, abdominal pain, anal bleeding, blood in stool, constipation, diarrhea, nausea, rectal pain and vomiting.   Endocrine: Negative for cold intolerance, heat intolerance and polyuria.   Genitourinary:  Negative for dysuria, flank pain, frequency and hematuria.   Musculoskeletal:  Negative for gait problem, joint swelling and neck pain.   Skin:  Negative for color change, rash and wound.   Allergic/Immunologic: Negative for environmental allergies and immunocompromised state.   Neurological:  Negative for dizziness, speech difficulty, weakness and numbness.   Psychiatric/Behavioral:  Negative for agitation, confusion and hallucinations.    Objective:     Vital Signs (Most Recent):  Temp: 98.2 °F (36.8 °C) (09/07/22 0626)  Pulse: 63 (09/07/22 0626)  Resp: 18 (09/07/22 0626)  BP: (!) 160/77 (09/07/22 0626)  SpO2: 99 % (09/07/22 0626)   Vital Signs (24h Range):  Temp:  [98.2 °F (36.8 °C)] 98.2 °F (36.8 °C)  Pulse:  [63] 63  Resp:  [18] 18  SpO2:  [99 %] 99 %  BP: (160)/(77) 160/77     Weight: 77  kg (169 lb 12.1 oz)  Body mass index is 29.14 kg/m².    Physical Exam  Constitutional:       Appearance: She is well-developed.   HENT:      Head: Normocephalic and atraumatic.   Eyes:      Conjunctiva/sclera: Conjunctivae normal.   Neck:      Thyroid: No thyromegaly.   Cardiovascular:      Rate and Rhythm: Normal rate and regular rhythm.   Pulmonary:      Effort: Pulmonary effort is normal. No respiratory distress.   Abdominal:      General: There is no distension.      Palpations: Abdomen is soft. There is no mass.      Tenderness: There is no abdominal tenderness.      Comments: Well-healed port and extraction sites   Musculoskeletal:         General: No tenderness. Normal range of motion.      Cervical back: Normal range of motion.   Skin:     General: Skin is warm and dry.      Capillary Refill: Capillary refill takes less than 2 seconds.      Findings: No rash.   Neurological:      Mental Status: She is alert and oriented to person, place, and time.

## 2022-09-07 NOTE — DISCHARGE SUMMARY
[place] - Surgery ([facility])  Obstetrics & Gynecology  Discharge Summary    Patient Name: Jhoana Sierra  MRN: [id number]  Admission Date: 9/7/2022  Hospital Length of Stay: 0 days  Discharge Date and Time: 9/7/2022  Attending Physician: Bela King MD   Discharging Provider: Bela King MD  Primary Care Provider: Dhruv Nguyen MD    HPI: Pelvic mass. Prior history of colon cancer.     Hospital Course: Patient presented for scheduled procedure. Patient was passed back to OR for RTLH/BSO Please see OP note for further details. Tolerated procedure well and patient was taken to recovery in a stable condition. Prior to discharge patient was able to void, ambulate, tolerate PO and pain was well controlled with PO meds. Patient was given routine post-op instructions for which patient voiced understanding. Patient was subsequently discharged home.      Procedure(s) (LRB):  XI ROBOTIC HYSTERECTOMY,WITH SALPINGO-OOPHORECTOMY (Bilateral)  LYSIS, ADHESIONS, LAPAROSCOPIC  LAPAROSCOPY, DIAGNOSTIC (N/A)     Consults:     Significant Diagnostic Studies:   Specimen (24h ago, onward)       Start     Ordered    09/07/22 0848  Specimen to Pathology, Surgery Gynecology and Obstetrics  Once        Comments: Pre-op Diagnosis: Cyst of ovary, unspecified laterality [N83.209]Procedure(s):XI ROBOTIC HYSTERECTOMY,WITH SALPINGO-OOPHORECTOMYLYSIS, ADHESIONS, LAPAROSCOPICLAPAROSCOPY, DIAGNOSTIC Number of specimens: 1Name of specimens: 1. Uterus, Cervix, Bilateral Fallopian Tubes, and Ovaries - PERM     References:    Click here for ordering Quick Tip   Question Answer Comment   Procedure Type: Gynecology and Obstetrics    Specimen Class: Routine/Screening    Which provider would you like to cc? BELA KING    Release to patient Immediate        09/07/22 0906                    Pending Diagnostic Studies:       Procedure Component Value Units Date/Time    Specimen to Pathology, Surgery Gynecology and Obstetrics [[id number]]  Collected: 09/07/22 0906    Order Status: Sent Lab Status: In process Updated: 09/07/22 0907    Specimen: Tissue           Final Active Diagnoses:    Diagnosis Date Noted POA    Personal history of colon cancer [Z85.038] 09/07/2022 Yes      Problems Resolved During this Admission:        Discharged Condition: good    Disposition:     Follow Up:    Patient Instructions:   No discharge procedures on file.  Medications:  Reconciled Home Medications:      Medication List        ASK your doctor about these medications      aspirin 81 mg Cap  81 MG  .     atorvastatin 40 MG tablet  Commonly known as: LIPITOR  Take 1 tablet (40 mg total) by mouth every evening.     co-enzyme Q-10 50 mg capsule  Take 100 mg by mouth once daily.     metoprolol succinate 25 MG 24 hr tablet  Commonly known as: TOPROL-XL  Take 1 tablet (25 mg total) by mouth once daily.     multivitamin per tablet  Commonly known as: THERAGRAN  Take 1 tablet by mouth once daily.     vitamin D 1000 units Tab  Commonly known as: VITAMIN D3  Take 1,000 Units by mouth once daily.            Rx Percocet and Ibuprofen sent to pharmacy.     Bela Noel MD  Obstetrics & Gynecology  '[place] - Surgery ([facility])

## 2022-09-07 NOTE — HPI
56yo F with previous rectosigmoid adenocarcinoma who is s/p lap LAR on 10/8/2020 with final path showing Stage 1: T2N0 adenocarcinoma who presents for definitive surgical management of an enlarged ovarian cyst for robotic MIREYA. CRS available for possibly VIKA and assistance with pelvic inlet entry.

## 2022-09-08 VITALS
BODY MASS INDEX: 28.98 KG/M2 | SYSTOLIC BLOOD PRESSURE: 114 MMHG | OXYGEN SATURATION: 96 % | WEIGHT: 169.75 LBS | RESPIRATION RATE: 69 BRPM | DIASTOLIC BLOOD PRESSURE: 63 MMHG | HEART RATE: 55 BPM | TEMPERATURE: 98 F | HEIGHT: 64 IN

## 2022-09-12 ENCOUNTER — PATIENT MESSAGE (OUTPATIENT)
Dept: GYNECOLOGIC ONCOLOGY | Facility: CLINIC | Age: 57
End: 2022-09-12
Payer: COMMERCIAL

## 2022-09-12 LAB
FINAL PATHOLOGIC DIAGNOSIS: NORMAL
GROSS: NORMAL
Lab: NORMAL

## 2022-09-30 ENCOUNTER — LAB VISIT (OUTPATIENT)
Dept: LAB | Facility: HOSPITAL | Age: 57
End: 2022-09-30
Attending: FAMILY MEDICINE
Payer: COMMERCIAL

## 2022-09-30 DIAGNOSIS — R74.8 ABNORMAL LIVER ENZYMES: ICD-10-CM

## 2022-09-30 LAB
ALBUMIN SERPL BCP-MCNC: 4.2 G/DL (ref 3.5–5.2)
ALP SERPL-CCNC: 102 U/L (ref 55–135)
ALT SERPL W/O P-5'-P-CCNC: 63 U/L (ref 10–44)
ANION GAP SERPL CALC-SCNC: 10 MMOL/L (ref 8–16)
AST SERPL-CCNC: 35 U/L (ref 10–40)
BILIRUB SERPL-MCNC: 0.5 MG/DL (ref 0.1–1)
BUN SERPL-MCNC: 17 MG/DL (ref 6–20)
CALCIUM SERPL-MCNC: 9.6 MG/DL (ref 8.7–10.5)
CHLORIDE SERPL-SCNC: 106 MMOL/L (ref 95–110)
CO2 SERPL-SCNC: 26 MMOL/L (ref 23–29)
CREAT SERPL-MCNC: 0.7 MG/DL (ref 0.5–1.4)
EST. GFR  (NO RACE VARIABLE): >60 ML/MIN/1.73 M^2
GLUCOSE SERPL-MCNC: 88 MG/DL (ref 70–110)
POTASSIUM SERPL-SCNC: 4.1 MMOL/L (ref 3.5–5.1)
PROT SERPL-MCNC: 7.3 G/DL (ref 6–8.4)
SODIUM SERPL-SCNC: 142 MMOL/L (ref 136–145)

## 2022-09-30 PROCEDURE — 80053 COMPREHEN METABOLIC PANEL: CPT | Performed by: FAMILY MEDICINE

## 2022-09-30 PROCEDURE — 36415 COLL VENOUS BLD VENIPUNCTURE: CPT | Mod: PO | Performed by: FAMILY MEDICINE

## 2022-10-03 ENCOUNTER — PATIENT MESSAGE (OUTPATIENT)
Dept: INTERNAL MEDICINE | Facility: CLINIC | Age: 57
End: 2022-10-03
Payer: COMMERCIAL

## 2022-10-03 ENCOUNTER — PATIENT MESSAGE (OUTPATIENT)
Dept: CARDIOLOGY | Facility: CLINIC | Age: 57
End: 2022-10-03
Payer: COMMERCIAL

## 2022-10-03 DIAGNOSIS — E78.5 HYPERLIPIDEMIA, UNSPECIFIED HYPERLIPIDEMIA TYPE: Primary | ICD-10-CM

## 2022-10-05 ENCOUNTER — OFFICE VISIT (OUTPATIENT)
Dept: GYNECOLOGIC ONCOLOGY | Facility: CLINIC | Age: 57
End: 2022-10-05
Payer: COMMERCIAL

## 2022-10-05 VITALS
BODY MASS INDEX: 29.3 KG/M2 | HEIGHT: 64 IN | SYSTOLIC BLOOD PRESSURE: 135 MMHG | DIASTOLIC BLOOD PRESSURE: 78 MMHG | WEIGHT: 171.63 LBS

## 2022-10-05 DIAGNOSIS — N83.209 CYST OF OVARY, UNSPECIFIED LATERALITY: ICD-10-CM

## 2022-10-05 DIAGNOSIS — R19.00 PELVIC MASS: ICD-10-CM

## 2022-10-05 DIAGNOSIS — Z85.038 PERSONAL HISTORY OF COLON CANCER: Primary | ICD-10-CM

## 2022-10-05 PROCEDURE — 3008F BODY MASS INDEX DOCD: CPT | Mod: CPTII,S$GLB,, | Performed by: OBSTETRICS & GYNECOLOGY

## 2022-10-05 PROCEDURE — 1159F MED LIST DOCD IN RCRD: CPT | Mod: CPTII,S$GLB,, | Performed by: OBSTETRICS & GYNECOLOGY

## 2022-10-05 PROCEDURE — 3075F SYST BP GE 130 - 139MM HG: CPT | Mod: CPTII,S$GLB,, | Performed by: OBSTETRICS & GYNECOLOGY

## 2022-10-05 PROCEDURE — 1159F PR MEDICATION LIST DOCUMENTED IN MEDICAL RECORD: ICD-10-PCS | Mod: CPTII,S$GLB,, | Performed by: OBSTETRICS & GYNECOLOGY

## 2022-10-05 PROCEDURE — 3044F PR MOST RECENT HEMOGLOBIN A1C LEVEL <7.0%: ICD-10-PCS | Mod: CPTII,S$GLB,, | Performed by: OBSTETRICS & GYNECOLOGY

## 2022-10-05 PROCEDURE — 99024 PR POST-OP FOLLOW-UP VISIT: ICD-10-PCS | Mod: S$GLB,,, | Performed by: OBSTETRICS & GYNECOLOGY

## 2022-10-05 PROCEDURE — 3078F PR MOST RECENT DIASTOLIC BLOOD PRESSURE < 80 MM HG: ICD-10-PCS | Mod: CPTII,S$GLB,, | Performed by: OBSTETRICS & GYNECOLOGY

## 2022-10-05 PROCEDURE — 3078F DIAST BP <80 MM HG: CPT | Mod: CPTII,S$GLB,, | Performed by: OBSTETRICS & GYNECOLOGY

## 2022-10-05 PROCEDURE — 3075F PR MOST RECENT SYSTOLIC BLOOD PRESS GE 130-139MM HG: ICD-10-PCS | Mod: CPTII,S$GLB,, | Performed by: OBSTETRICS & GYNECOLOGY

## 2022-10-05 PROCEDURE — 99999 PR PBB SHADOW E&M-EST. PATIENT-LVL III: ICD-10-PCS | Mod: PBBFAC,,, | Performed by: OBSTETRICS & GYNECOLOGY

## 2022-10-05 PROCEDURE — 99024 POSTOP FOLLOW-UP VISIT: CPT | Mod: S$GLB,,, | Performed by: OBSTETRICS & GYNECOLOGY

## 2022-10-05 PROCEDURE — 99999 PR PBB SHADOW E&M-EST. PATIENT-LVL III: CPT | Mod: PBBFAC,,, | Performed by: OBSTETRICS & GYNECOLOGY

## 2022-10-05 PROCEDURE — 3044F HG A1C LEVEL LT 7.0%: CPT | Mod: CPTII,S$GLB,, | Performed by: OBSTETRICS & GYNECOLOGY

## 2022-10-05 PROCEDURE — 3008F PR BODY MASS INDEX (BMI) DOCUMENTED: ICD-10-PCS | Mod: CPTII,S$GLB,, | Performed by: OBSTETRICS & GYNECOLOGY

## 2022-10-05 NOTE — PROGRESS NOTES
Subjective:      Patient ID: Jhoana Sierra is a 57 y.o. female.    Chief Complaint: Post-op Evaluation      HPI  S/p RTLH/BSO 2022  Uncomplicated post operative course.   Final pathology reviewed and benign.   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  THE RIGHT   FALLOPIAN TUBE HAS BENIGN PARATUBAL CYSTS   THE LEFT OVARY IS UNREMARKABLE.  THE LEFT FALLOPIAN TUBE HAS BENIGN PARATUBAL   CYSTS     Presents today for post operative visit. Recovering appropriately from surgery. Up and about, eating, +BM.      Referral history:  Referred by Dr. Hannah for pelvic mass and coordination of care locally here in [place].     Pelvic US   FINDINGS:  Uterus:  Size: 6.9 x 2.5 x 4.2 cm  Endometrium: The endometrium measures 6.5 mm in thickness.  No discrete endometrial lesion.  Right ovary: Size: 5.5 x 2.9 x 3.6 cm  Appearance: There is a mildly complex cyst arising from the right ovary measuring 5.1 x 2.7 x 3.8 cm which corresponds to the lesion seen on recent CT exam.  This cyst contains an internal septation.  No focal nodular component within this cyst.  Left ovary: Not visualized secondary to adjacent bowel gas.  Free Fluid: None.      12.4, not elevated.      CT for prior comparison 2020: There is a right adnexal cystic lesion seen which measures 3.2 cm.     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 Currently on ASA. S/p heart cath.   She is overall asymptomatic from a pelvic mass standpoint.      Prior abdominal surgeries include laparoscopic sigmoid colon resection with Dr. Hannah for colon cancer, ovarian cystectomy in  for what she says was a dermoid cyst of the ovary/unsure which side vix pfannenstiel.  x 2.       Denies postmenopausal bleeding. Dr. Mederos is  her primary ob/gyn.   Review of Systems   Constitutional:  Negative for appetite change, chills, fatigue and fever.   HENT:  Negative for mouth sores.    Respiratory:  Negative for cough and shortness of breath.    Cardiovascular:  Negative for leg swelling.   Gastrointestinal:  Negative for abdominal pain, blood in stool, constipation and diarrhea.   Endocrine: Negative for cold intolerance.   Genitourinary:  Negative for dysuria and vaginal bleeding.   Musculoskeletal:  Negative for myalgias.   Skin:  Negative for rash.   Allergic/Immunologic: Negative.    Neurological:  Negative for weakness and numbness.   Hematological:  Negative for adenopathy. Does not bruise/bleed easily.   Psychiatric/Behavioral:  Negative for confusion.      Objective:   Physical Exam:   Constitutional: She is oriented to person, place, and time. She appears well-developed and well-nourished.    HENT:   Head: Normocephalic and atraumatic.    Eyes: Pupils are equal, round, and reactive to light. EOM are normal.    Neck: No thyromegaly present.    Cardiovascular:  Normal rate, regular rhythm and intact distal pulses.             Pulmonary/Chest: Effort normal and breath sounds normal. No respiratory distress. She has no wheezes.        Abdominal: Soft. Bowel sounds are normal. She exhibits abdominal incision. She exhibits no distension and no mass. There is no abdominal tenderness.             Musculoskeletal: Normal range of motion and moves all extremeties.      Lymphadenopathy:     She has no cervical adenopathy.        Right: No supraclavicular adenopathy present.        Left: No supraclavicular adenopathy present.    Neurological: She is alert and oriented to person, place, and time.    Skin: Skin is warm and dry. No rash noted.    Psychiatric: She has a normal mood and affect.     Assessment:     1. Personal history of colon cancer    2. Cyst of ovary, unspecified laterality    3. Pelvic mass        Plan:   No orders of the defined  types were placed in this encounter.    Recovering appropriately from surgery.   Fortunately benign pathology as above.   Will return care to primary provider.   May return to clinic with me as needed.

## 2022-10-11 RX ORDER — ATORVASTATIN CALCIUM 20 MG/1
20 TABLET, FILM COATED ORAL DAILY
Qty: 90 TABLET | Refills: 3 | Status: SHIPPED | OUTPATIENT
Start: 2022-10-11 | End: 2023-06-20

## 2022-10-17 ENCOUNTER — LAB VISIT (OUTPATIENT)
Dept: LAB | Facility: HOSPITAL | Age: 57
End: 2022-10-17
Attending: COLON & RECTAL SURGERY
Payer: COMMERCIAL

## 2022-10-17 ENCOUNTER — OFFICE VISIT (OUTPATIENT)
Dept: SURGERY | Facility: CLINIC | Age: 57
End: 2022-10-17
Payer: COMMERCIAL

## 2022-10-17 ENCOUNTER — PATIENT MESSAGE (OUTPATIENT)
Dept: SURGERY | Facility: CLINIC | Age: 57
End: 2022-10-17

## 2022-10-17 VITALS
SYSTOLIC BLOOD PRESSURE: 141 MMHG | WEIGHT: 172.38 LBS | HEIGHT: 64 IN | HEART RATE: 77 BPM | TEMPERATURE: 98 F | BODY MASS INDEX: 29.43 KG/M2 | DIASTOLIC BLOOD PRESSURE: 84 MMHG

## 2022-10-17 DIAGNOSIS — C18.9 COLON ADENOCARCINOMA: ICD-10-CM

## 2022-10-17 DIAGNOSIS — Z85.038 PERSONAL HISTORY OF COLON CANCER: Primary | ICD-10-CM

## 2022-10-17 DIAGNOSIS — Z85.038 PERSONAL HISTORY OF COLON CANCER: ICD-10-CM

## 2022-10-17 PROCEDURE — 99214 OFFICE O/P EST MOD 30 MIN: CPT | Mod: S$GLB,,, | Performed by: COLON & RECTAL SURGERY

## 2022-10-17 PROCEDURE — 82378 CARCINOEMBRYONIC ANTIGEN: CPT | Performed by: COLON & RECTAL SURGERY

## 2022-10-17 PROCEDURE — 36415 COLL VENOUS BLD VENIPUNCTURE: CPT | Performed by: COLON & RECTAL SURGERY

## 2022-10-17 PROCEDURE — 1159F PR MEDICATION LIST DOCUMENTED IN MEDICAL RECORD: ICD-10-PCS | Mod: CPTII,S$GLB,, | Performed by: COLON & RECTAL SURGERY

## 2022-10-17 PROCEDURE — 99999 PR PBB SHADOW E&M-EST. PATIENT-LVL III: CPT | Mod: PBBFAC,,, | Performed by: COLON & RECTAL SURGERY

## 2022-10-17 PROCEDURE — 3044F PR MOST RECENT HEMOGLOBIN A1C LEVEL <7.0%: ICD-10-PCS | Mod: CPTII,S$GLB,, | Performed by: COLON & RECTAL SURGERY

## 2022-10-17 PROCEDURE — 3077F SYST BP >= 140 MM HG: CPT | Mod: CPTII,S$GLB,, | Performed by: COLON & RECTAL SURGERY

## 2022-10-17 PROCEDURE — 1159F MED LIST DOCD IN RCRD: CPT | Mod: CPTII,S$GLB,, | Performed by: COLON & RECTAL SURGERY

## 2022-10-17 PROCEDURE — 3044F HG A1C LEVEL LT 7.0%: CPT | Mod: CPTII,S$GLB,, | Performed by: COLON & RECTAL SURGERY

## 2022-10-17 PROCEDURE — 3077F PR MOST RECENT SYSTOLIC BLOOD PRESSURE >= 140 MM HG: ICD-10-PCS | Mod: CPTII,S$GLB,, | Performed by: COLON & RECTAL SURGERY

## 2022-10-17 PROCEDURE — 99214 PR OFFICE/OUTPT VISIT, EST, LEVL IV, 30-39 MIN: ICD-10-PCS | Mod: S$GLB,,, | Performed by: COLON & RECTAL SURGERY

## 2022-10-17 PROCEDURE — 3079F PR MOST RECENT DIASTOLIC BLOOD PRESSURE 80-89 MM HG: ICD-10-PCS | Mod: CPTII,S$GLB,, | Performed by: COLON & RECTAL SURGERY

## 2022-10-17 PROCEDURE — 99999 PR PBB SHADOW E&M-EST. PATIENT-LVL III: ICD-10-PCS | Mod: PBBFAC,,, | Performed by: COLON & RECTAL SURGERY

## 2022-10-17 PROCEDURE — 3079F DIAST BP 80-89 MM HG: CPT | Mod: CPTII,S$GLB,, | Performed by: COLON & RECTAL SURGERY

## 2022-10-17 NOTE — PROGRESS NOTES
History & Physical    SUBJECTIVE:     CC: rectosigmoid adenocarcinoma  Ref MD: Rianna Dillon MD    History of Present Illness:  Patient is a 57 y.o. 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  Patient had a positive fit test in June 2020 which prompted the scheduling of a colonoscopy.  This colonoscopy was performed in August 2020 and showed a mass in the rectosigmoid region with biopsies resulting in adenocarcinoma.  Patient states that she had seen intermittent blood in her stool for 3 months prior to this.  This was her 1st colonoscopy ever.  She denies any associated fever, chills, nausea, vomiting, diarrhea, constipation, weight loss or night sweats.  She has no known personal family history of colorectal cancer or colonic polyps previously.  Her only significant abdominal surgical history is an open ovarian cyst removal via Pfannenstiel incision.    10/8/2020: Lap LAR (Stage 1: T2N0 adenoCA)    Interval history:  Since last clinic visit, the patient underwent a robotic hysterectomy and bilateral salpingo oophorectomy with gyn Oncology with benign pathology.  She has been tolerating regular diet having good bowel function.  She denies any fever, chills, nausea, vomiting.  Denies any hematochezia or melena.    Review of patient's allergies indicates:  No Known Allergies    Current Outpatient Medications   Medication Sig Dispense Refill    aspirin 81 mg Cap 81 MG  .      atorvastatin (LIPITOR) 20 MG tablet Take 1 tablet (20 mg total) by mouth once daily. 90 tablet 3    metoprolol succinate (TOPROL-XL) 25 MG 24 hr tablet Take 1 tablet (25 mg total) by mouth once daily. 30 tablet 11     No current facility-administered medications for this visit.       Past Medical History:   Diagnosis Date    Adenocarcinoma of colon     Colon cancer 2020    Heart attack 01/22/2022    PONV (postoperative nausea and vomiting)      Past Surgical History:   Procedure Laterality Date    COLONOSCOPY N/A 8/25/2020     Procedure: COLONOSCOPY;  Surgeon: Rianna Dillon MD;  Location: [facility];  Service: Endoscopy;  Laterality: N/A;    COLONOSCOPY N/A 1/7/2022    Procedure: COLONOSCOPY;  Surgeon: Ziggy Hannah MD;  Location: [facility] ENDO;  Service: General;  Laterality: N/A;    DIAGNOSTIC LAPAROSCOPY N/A 9/7/2022    Procedure: LAPAROSCOPY, DIAGNOSTIC;  Surgeon: Ziggy Hannah MD;  Location: [facility] OR;  Service: General;  Laterality: N/A;    FLEXIBLE SIGMOIDOSCOPY N/A 10/8/2020    Procedure: SIGMOIDOSCOPY, FLEXIBLE;  Surgeon: Ziggy Hannah MD;  Location: [facility] OR;  Service: General;  Laterality: N/A;    INJECTION OF ANESTHETIC AGENT INTO TISSUE PLANE DEFINED BY TRANSVERSUS ABDOMINIS MUSCLE N/A 10/8/2020    Procedure: BLOCK, TRANSVERSUS ABDOMINIS PLANE;  Surgeon: Ziggy Hannah MD;  Location: [facility] OR;  Service: General;  Laterality: N/A;    LAPAROSCOPIC LYSIS OF ADHESIONS  9/7/2022    Procedure: LYSIS, ADHESIONS, LAPAROSCOPIC;  Surgeon: Ziggy Hannah MD;  Location: [facility] OR;  Service: General;;    LAPAROSCOPIC SIGMOIDECTOMY N/A 10/8/2020    Procedure: COLECTOMY, SIGMOID, LAPAROSCOPIC;  Surgeon: Ziggy Hannah MD;  Location: [facility] OR;  Service: General;  Laterality: N/A;  lower anterior resection  Proctosigmoidectomy    LAPAROTOMY N/A 10/8/2020    Procedure: LAPAROTOMY;  Surgeon: Ziggy Hannah MD;  Location: [facility] OR;  Service: General;  Laterality: N/A;    OVARIAN CYST REMOVAL      WISDOM TOOTH EXTRACTION      XI ROBOTIC HYSTERECTOMY, WITH SALPINGO-OOPHORECTOMY Bilateral 9/7/2022    Procedure: XI ROBOTIC HYSTERECTOMY,WITH SALPINGO-OOPHORECTOMY;  Surgeon: Bela Noel MD;  Location: [facility] OR;  Service: OB/GYN;  Laterality: Bilateral;     Family History   Problem Relation Age of Onset    Fibromyalgia Sister     Diabetes Neg Hx     Heart disease Neg Hx      Social History     Tobacco Use    Smoking status: Never    Smokeless tobacco: Never   Substance Use Topics    Alcohol use: Yes     Comment: Occ use;  "HOLD 72HRS PRIOR TO SURGERY    Drug use: No        Review of Systems:  Review of Systems   Constitutional:  Negative for activity change, appetite change, chills, fatigue, fever and unexpected weight change.   HENT:  Negative for congestion, ear pain, sore throat and trouble swallowing.    Eyes:  Negative for pain, redness and itching.   Respiratory:  Negative for cough, shortness of breath and wheezing.    Cardiovascular:  Negative for chest pain, palpitations and leg swelling.   Gastrointestinal:  Negative for abdominal distention, abdominal pain, blood in stool, constipation, diarrhea, nausea, rectal pain and vomiting.   Endocrine: Negative for cold intolerance, heat intolerance and polyuria.   Genitourinary:  Negative for dysuria, flank pain, frequency and hematuria.   Musculoskeletal:  Negative for gait problem, joint swelling and neck pain.   Skin:  Negative for color change, rash and wound.   Allergic/Immunologic: Negative for environmental allergies and immunocompromised state.   Neurological:  Negative for dizziness, speech difficulty, weakness and numbness.   Psychiatric/Behavioral:  Negative for agitation, confusion and hallucinations.      OBJECTIVE:     Vital Signs (Most Recent)  Temp: 97.9 °F (36.6 °C) (10/17/22 1329)  Pulse: 77 (10/17/22 1329)  BP: (!) 141/84 (10/17/22 1329)  5' 4" (1.626 m)  78.2 kg (172 lb 6.4 oz)     Physical Exam:  Physical Exam  Constitutional:       Appearance: She is well-developed.   HENT:      Head: Normocephalic and atraumatic.   Eyes:      Conjunctiva/sclera: Conjunctivae normal.   Neck:      Thyroid: No thyromegaly.   Cardiovascular:      Rate and Rhythm: Normal rate and regular rhythm.   Pulmonary:      Effort: Pulmonary effort is normal. No respiratory distress.   Abdominal:      General: There is no distension.      Palpations: Abdomen is soft. There is no mass.      Tenderness: There is no abdominal tenderness.      Comments: Well-healed incisions without erythema or " drainage   Musculoskeletal:         General: No tenderness. Normal range of motion.      Cervical back: Normal range of motion.   Skin:     General: Skin is warm and dry.      Capillary Refill: Capillary refill takes less than 2 seconds.      Findings: No rash.   Neurological:      Mental Status: She is alert and oriented to person, place, and time.       Laboratory  Lab Results   Component Value Date    WBC 4.74 08/29/2022    HGB 12.9 08/29/2022    HCT 39.8 08/29/2022     08/29/2022    CHOL 158 06/02/2022    TRIG 64 06/02/2022    HDL 70 06/02/2022    ALT 63 (H) 09/30/2022    AST 35 09/30/2022     09/30/2022    K 4.1 09/30/2022     09/30/2022    CREATININE 0.7 09/30/2022    BUN 17 09/30/2022    CO2 26 09/30/2022    TSH 2.561 06/02/2022    HGBA1C 5.5 06/02/2022     Component Ref Range & Units 2 mo ago   (7/25/22) 6 mo ago   (4/11/22) 1 yr ago   (10/11/21) 1 yr ago   (6/17/21) 1 yr ago   (4/12/21) 1 yr ago   (3/12/21) 1 yr ago   (1/15/21)   CEA 0.0 - 5.0 ng/mL <1.7  1.7 CM  2.1 CM  2.0 CM  1.6 CM  1.9 CM  2.7 CM        Diagnostic Results:  CT: Reviewed     FINDINGS:  CHEST     No infiltrates or pleural effusions are identified.  There are a couple of stable subpleural less than 3 mm noncalcified nodule seen within either lung.  There are also a couple of tiny calcified granulomas seen within either lung.     The ascending aorta is mildly dilated but not considered aneurysmal and measures approximately 3.6 cm.  There is no pericardial effusion.  No enlarged mediastinal, hilar or axillary lymph nodes are identified.     ABDOMEN     Liver/gallbladder/biliary: The liver demonstrates no focal abnormality. The gallbladder is present and unremarkable.  No biliary ductal dilation.     Pancreas: The pancreas is unremarkable in appearance.     Spleen: The spleen is not enlarged.     Adrenals: Unremarkable     Kidneys: The kidneys are equally perfused and demonstrate no solid masses.  Small probable cyst seen  within the upper pole of the left kidney.  There is a nonobstructing upper pole left renal calculus that measures approximately 3 mm in size.     Bowel/Mesentery: There is no evidence of bowel obstruction. Postsurgical changes seen in the region of the sigmoid colon with the linear intraluminal density noted in the region of postsurgical changes as well.  No mesenteric stranding or adenopathy.     Retroperitoneum: No adenopathy.  The aorta demonstrates a normal caliber.     PELVIS     Genitourinary/Reproductive organs: A right adnexal cyst is again noted that measures up to approximately 3.9 cm in size.     Adenopathy: None     Free Fluid: No free fluid     Osseus Structures/Soft tissues: No suspicious appearing osseus lesions. No significant soft tissue abnormality.     Impression:     1. No evidence to suggest metastatic disease.  2. Postsurgical changes noted in the region of the sigmoid colon.  3. Remaining findings as discussed above.    ASSESSMENT/PLAN:     58yo F with rectosigmoid adenocarcinoma with negative workup for metastatic disease who is now s/p lap LAR on 10/8/2020 with final path showing Stage 1: T2N0 adenocarcinoma    - no further interventions required at this time  - CEA level today and q3 months  - CT C/A/P due soon, ordered  - Colonoscopy due in 3 years  - RTC 3-6 months    Ziggy Hannah MD  Colon and Rectal Surgery  [facility]

## 2022-10-18 LAB — CEA SERPL-MCNC: 2 NG/ML (ref 0–5)

## 2022-10-20 DIAGNOSIS — C18.9 COLON ADENOCARCINOMA: Primary | ICD-10-CM

## 2022-12-01 ENCOUNTER — PATIENT MESSAGE (OUTPATIENT)
Dept: INTERNAL MEDICINE | Facility: CLINIC | Age: 57
End: 2022-12-01
Payer: COMMERCIAL

## 2022-12-02 ENCOUNTER — PATIENT MESSAGE (OUTPATIENT)
Dept: SURGERY | Facility: CLINIC | Age: 57
End: 2022-12-02
Payer: COMMERCIAL

## 2022-12-06 ENCOUNTER — LAB VISIT (OUTPATIENT)
Dept: LAB | Facility: HOSPITAL | Age: 57
End: 2022-12-06
Attending: FAMILY MEDICINE
Payer: COMMERCIAL

## 2022-12-06 DIAGNOSIS — E78.5 HYPERLIPIDEMIA, UNSPECIFIED HYPERLIPIDEMIA TYPE: ICD-10-CM

## 2022-12-06 LAB
ALBUMIN SERPL BCP-MCNC: 4 G/DL (ref 3.5–5.2)
ALP SERPL-CCNC: 145 U/L (ref 55–135)
ALT SERPL W/O P-5'-P-CCNC: 93 U/L (ref 10–44)
ANION GAP SERPL CALC-SCNC: 9 MMOL/L (ref 8–16)
AST SERPL-CCNC: 42 U/L (ref 10–40)
BILIRUB SERPL-MCNC: 0.7 MG/DL (ref 0.1–1)
BUN SERPL-MCNC: 16 MG/DL (ref 6–20)
CALCIUM SERPL-MCNC: 9.7 MG/DL (ref 8.7–10.5)
CHLORIDE SERPL-SCNC: 106 MMOL/L (ref 95–110)
CHOLEST SERPL-MCNC: 212 MG/DL (ref 120–199)
CHOLEST/HDLC SERPL: 3.1 {RATIO} (ref 2–5)
CO2 SERPL-SCNC: 25 MMOL/L (ref 23–29)
CREAT SERPL-MCNC: 0.7 MG/DL (ref 0.5–1.4)
EST. GFR  (NO RACE VARIABLE): >60 ML/MIN/1.73 M^2
GLUCOSE SERPL-MCNC: 91 MG/DL (ref 70–110)
HDLC SERPL-MCNC: 68 MG/DL (ref 40–75)
HDLC SERPL: 32.1 % (ref 20–50)
LDLC SERPL CALC-MCNC: 125.4 MG/DL (ref 63–159)
NONHDLC SERPL-MCNC: 144 MG/DL
POTASSIUM SERPL-SCNC: 4.1 MMOL/L (ref 3.5–5.1)
PROT SERPL-MCNC: 7.2 G/DL (ref 6–8.4)
SODIUM SERPL-SCNC: 140 MMOL/L (ref 136–145)
TRIGL SERPL-MCNC: 93 MG/DL (ref 30–150)

## 2022-12-06 PROCEDURE — 80061 LIPID PANEL: CPT | Performed by: FAMILY MEDICINE

## 2022-12-06 PROCEDURE — 80053 COMPREHEN METABOLIC PANEL: CPT | Performed by: FAMILY MEDICINE

## 2022-12-06 PROCEDURE — 36415 COLL VENOUS BLD VENIPUNCTURE: CPT | Mod: PO | Performed by: FAMILY MEDICINE

## 2022-12-07 ENCOUNTER — TELEPHONE (OUTPATIENT)
Dept: INTERNAL MEDICINE | Facility: CLINIC | Age: 57
End: 2022-12-07
Payer: COMMERCIAL

## 2022-12-07 DIAGNOSIS — R74.8 ELEVATED LIVER ENZYMES: Primary | ICD-10-CM

## 2022-12-28 ENCOUNTER — HOSPITAL ENCOUNTER (OUTPATIENT)
Dept: RADIOLOGY | Facility: HOSPITAL | Age: 57
Discharge: HOME | End: 2022-12-28
Attending: COLON & RECTAL SURGERY
Payer: COMMERCIAL

## 2022-12-28 DIAGNOSIS — Z85.038 PERSONAL HISTORY OF COLON CANCER: ICD-10-CM

## 2022-12-28 PROCEDURE — 71260 CT THORAX DX C+: CPT | Mod: TC

## 2022-12-28 PROCEDURE — 25500020 PHARM REV CODE 255: Performed by: COLON & RECTAL SURGERY

## 2022-12-28 PROCEDURE — 74177 CT ABD & PELVIS W/CONTRAST: CPT | Mod: TC

## 2022-12-28 RX ADMIN — IOHEXOL 30 ML: 350 INJECTION, SOLUTION INTRAVENOUS at 12:12

## 2022-12-28 RX ADMIN — IOHEXOL 100 ML: 350 INJECTION, SOLUTION INTRAVENOUS at 12:12

## 2022-12-30 ENCOUNTER — PATIENT MESSAGE (OUTPATIENT)
Dept: SURGERY | Facility: CLINIC | Age: 57
End: 2022-12-30
Payer: COMMERCIAL

## 2023-01-06 ENCOUNTER — OFFICE VISIT (OUTPATIENT)
Dept: CARDIOLOGY | Facility: CLINIC | Age: 58
End: 2023-01-06
Payer: COMMERCIAL

## 2023-01-06 VITALS
HEART RATE: 70 BPM | DIASTOLIC BLOOD PRESSURE: 90 MMHG | WEIGHT: 175.94 LBS | SYSTOLIC BLOOD PRESSURE: 154 MMHG | BODY MASS INDEX: 30.04 KG/M2 | HEIGHT: 64 IN | OXYGEN SATURATION: 98 %

## 2023-01-06 DIAGNOSIS — I10 PRIMARY HYPERTENSION: ICD-10-CM

## 2023-01-06 DIAGNOSIS — C18.9 ADENOCARCINOMA OF COLON: ICD-10-CM

## 2023-01-06 DIAGNOSIS — Z88.8 ALLERGY TO STATIN MEDICATION: ICD-10-CM

## 2023-01-06 DIAGNOSIS — R79.89 ELEVATED LFTS: ICD-10-CM

## 2023-01-06 DIAGNOSIS — I21.02 ST ELEVATION MYOCARDIAL INFARCTION INVOLVING LEFT ANTERIOR DESCENDING (LAD) CORONARY ARTERY: ICD-10-CM

## 2023-01-06 DIAGNOSIS — I25.42 SPONTANEOUS DISSECTION OF CORONARY ARTERY: Primary | ICD-10-CM

## 2023-01-06 DIAGNOSIS — R94.31 ABNORMAL EKG: ICD-10-CM

## 2023-01-06 DIAGNOSIS — C18.9 COLON ADENOCARCINOMA: ICD-10-CM

## 2023-01-06 DIAGNOSIS — K52.832 LYMPHOCYTIC COLITIS: ICD-10-CM

## 2023-01-06 DIAGNOSIS — I77.3 FIBROMUSCULAR DYSPLASIA: ICD-10-CM

## 2023-01-06 PROCEDURE — 3080F PR MOST RECENT DIASTOLIC BLOOD PRESSURE >= 90 MM HG: ICD-10-PCS | Mod: CPTII,S$GLB,, | Performed by: INTERNAL MEDICINE

## 2023-01-06 PROCEDURE — 1159F MED LIST DOCD IN RCRD: CPT | Mod: CPTII,S$GLB,, | Performed by: INTERNAL MEDICINE

## 2023-01-06 PROCEDURE — 3008F PR BODY MASS INDEX (BMI) DOCUMENTED: ICD-10-PCS | Mod: CPTII,S$GLB,, | Performed by: INTERNAL MEDICINE

## 2023-01-06 PROCEDURE — 3080F DIAST BP >= 90 MM HG: CPT | Mod: CPTII,S$GLB,, | Performed by: INTERNAL MEDICINE

## 2023-01-06 PROCEDURE — 3008F BODY MASS INDEX DOCD: CPT | Mod: CPTII,S$GLB,, | Performed by: INTERNAL MEDICINE

## 2023-01-06 PROCEDURE — 1159F PR MEDICATION LIST DOCUMENTED IN MEDICAL RECORD: ICD-10-PCS | Mod: CPTII,S$GLB,, | Performed by: INTERNAL MEDICINE

## 2023-01-06 PROCEDURE — 99999 PR PBB SHADOW E&M-EST. PATIENT-LVL III: CPT | Mod: PBBFAC,,, | Performed by: INTERNAL MEDICINE

## 2023-01-06 PROCEDURE — 1160F RVW MEDS BY RX/DR IN RCRD: CPT | Mod: CPTII,S$GLB,, | Performed by: INTERNAL MEDICINE

## 2023-01-06 PROCEDURE — 3077F PR MOST RECENT SYSTOLIC BLOOD PRESSURE >= 140 MM HG: ICD-10-PCS | Mod: CPTII,S$GLB,, | Performed by: INTERNAL MEDICINE

## 2023-01-06 PROCEDURE — 1160F PR REVIEW ALL MEDS BY PRESCRIBER/CLIN PHARMACIST DOCUMENTED: ICD-10-PCS | Mod: CPTII,S$GLB,, | Performed by: INTERNAL MEDICINE

## 2023-01-06 PROCEDURE — 99214 OFFICE O/P EST MOD 30 MIN: CPT | Mod: S$GLB,,, | Performed by: INTERNAL MEDICINE

## 2023-01-06 PROCEDURE — 3077F SYST BP >= 140 MM HG: CPT | Mod: CPTII,S$GLB,, | Performed by: INTERNAL MEDICINE

## 2023-01-06 PROCEDURE — 99999 PR PBB SHADOW E&M-EST. PATIENT-LVL III: ICD-10-PCS | Mod: PBBFAC,,, | Performed by: INTERNAL MEDICINE

## 2023-01-06 PROCEDURE — 99214 PR OFFICE/OUTPT VISIT, EST, LEVL IV, 30-39 MIN: ICD-10-PCS | Mod: S$GLB,,, | Performed by: INTERNAL MEDICINE

## 2023-01-06 RX ORDER — LOSARTAN POTASSIUM 25 MG/1
25 TABLET ORAL DAILY
Qty: 90 TABLET | Refills: 3 | Status: SHIPPED | OUTPATIENT
Start: 2023-01-06 | End: 2023-06-20

## 2023-01-06 NOTE — PROGRESS NOTES
Subjective:   Patient ID:  Jhoana Sierra is a 57 y.o. female who presents for follow up of No chief complaint on file.      HPI  2/14/2022 DR COOK  56 yo female, came in for post STEMI/ coronary dissection f/u  PMH h/o Colon cancer emoval parts in  no chemo and XRT  and in remission, fibromyalgia  01/24/2022 admitted to [facility] for STEMI. Emergent cath showed spontaneous mild to distal LAD dissection, normal EF and filling pressure. ascending TAA 4 cm.  Chest CTA showed no PE and TAA  Brain CT negative.   ABD CTA showed slight beaded appurtenance of shelbi l A. Typical for fibromuscular dysplasia  Vascularis workup negative for NIRANJAN CRP KHANG and ANCA.   D/c with ASA lipitor and metoprolol  Now no chest pain.      7/1/2022   HAS OCCASIONAL SHARP CHEST PAIN DIFFERENT THAN HER ORIGINAL EVENT  WITH CORONARY DISSECTION OF DISTAL LAD. SHE ASCENCIO SNO EXERTIONAL SYMPTOMS BACK TO YARD WORK AND BEING PHYSICALLY ACTIVE. HAS NO CHF SYMPTOMS HAS NO PALPITATION. SHE NEEDS SURGERY ON HER OVARIAN CYST. SHE IS HERE FOR CARDIAC CLEARANCE. NO H/O HTN OR ANY FAMILY H/O SCAT. HAD FIBROMUSCULAR DYSPLASIA ON RENAL ARTERIES.HAD INCREASED LFT NECESSITATING CESSATION OF STATIN THERAPY.     1/6/2023   Asymptomatic doing well clinically has not been exercising regularily tries to be compliant with diet takes meds regularily family stress. No angina .had hysterectomy in September no cardiac issues.  Past Medical History:   Diagnosis Date    Adenocarcinoma of colon     Colon cancer 2020    Heart attack 01/22/2022    PONV (postoperative nausea and vomiting)        Past Surgical History:   Procedure Laterality Date    COLONOSCOPY N/A 8/25/2020    Procedure: COLONOSCOPY;  Surgeon: Rianna Dillon MD;  Location: [facility];  Service: Endoscopy;  Laterality: N/A;    COLONOSCOPY N/A 1/7/2022    Procedure: COLONOSCOPY;  Surgeon: Ziggy Hannah MD;  Location: [facility];  Service: General;  Laterality: N/A;    DIAGNOSTIC LAPAROSCOPY N/A 9/7/2022     Procedure: LAPAROSCOPY, DIAGNOSTIC;  Surgeon: Ziggy Hannah MD;  Location: [facility] OR;  Service: General;  Laterality: N/A;    FLEXIBLE SIGMOIDOSCOPY N/A 10/8/2020    Procedure: SIGMOIDOSCOPY, FLEXIBLE;  Surgeon: Ziggy Hannah MD;  Location: [facility] OR;  Service: General;  Laterality: N/A;    INJECTION OF ANESTHETIC AGENT INTO TISSUE PLANE DEFINED BY TRANSVERSUS ABDOMINIS MUSCLE N/A 10/8/2020    Procedure: BLOCK, TRANSVERSUS ABDOMINIS PLANE;  Surgeon: Ziggy Hannah MD;  Location: [facility] OR;  Service: General;  Laterality: N/A;    LAPAROSCOPIC LYSIS OF ADHESIONS  9/7/2022    Procedure: LYSIS, ADHESIONS, LAPAROSCOPIC;  Surgeon: Ziggy Hannah MD;  Location: [facility] OR;  Service: General;;    LAPAROSCOPIC SIGMOIDECTOMY N/A 10/8/2020    Procedure: COLECTOMY, SIGMOID, LAPAROSCOPIC;  Surgeon: Ziggy Hannah MD;  Location: [facility] OR;  Service: General;  Laterality: N/A;  lower anterior resection  Proctosigmoidectomy    LAPAROTOMY N/A 10/8/2020    Procedure: LAPAROTOMY;  Surgeon: Ziggy Hannah MD;  Location: [facility] OR;  Service: General;  Laterality: N/A;    OVARIAN CYST REMOVAL      WISDOM TOOTH EXTRACTION      XI ROBOTIC HYSTERECTOMY, WITH SALPINGO-OOPHORECTOMY Bilateral 9/7/2022    Procedure: XI ROBOTIC HYSTERECTOMY,WITH SALPINGO-OOPHORECTOMY;  Surgeon: Bela Noel MD;  Location: [facility];  Service: OB/GYN;  Laterality: Bilateral;       Social History     Tobacco Use    Smoking status: Never    Smokeless tobacco: Never   Substance Use Topics    Alcohol use: Yes     Comment: Occ use; HOLD 72HRS PRIOR TO SURGERY    Drug use: No       Family History   Problem Relation Age of Onset    Fibromyalgia Sister     Diabetes Neg Hx     Heart disease Neg Hx        Current Outpatient Medications   Medication Sig    aspirin 81 mg Cap 81 MG  .    metoprolol succinate (TOPROL-XL) 25 MG 24 hr tablet Take 1 tablet (25 mg total) by mouth once daily.    atorvastatin (LIPITOR) 20 MG tablet Take 1 tablet (20 mg total) by mouth  once daily. (Patient not taking: Reported on 1/6/2023)     No current facility-administered medications for this visit.     Current Outpatient Medications on File Prior to Visit   Medication Sig    aspirin 81 mg Cap 81 MG  .    metoprolol succinate (TOPROL-XL) 25 MG 24 hr tablet Take 1 tablet (25 mg total) by mouth once daily.    atorvastatin (LIPITOR) 20 MG tablet Take 1 tablet (20 mg total) by mouth once daily. (Patient not taking: Reported on 1/6/2023)     No current facility-administered medications on file prior to visit.     Review of patient's allergies indicates:  No Known Allergies   Review of Systems   Constitutional: Negative for diaphoresis, malaise/fatigue and weight gain.   HENT:  Negative for hoarse voice.    Eyes:  Negative for double vision and visual disturbance.   Cardiovascular:  Negative for chest pain, claudication, cyanosis, dyspnea on exertion, irregular heartbeat, leg swelling, near-syncope, orthopnea, palpitations, paroxysmal nocturnal dyspnea and syncope.   Respiratory:  Negative for cough, hemoptysis, shortness of breath and snoring.    Hematologic/Lymphatic: Negative for bleeding problem. Does not bruise/bleed easily.   Skin:  Negative for color change and poor wound healing.   Musculoskeletal:  Negative for muscle cramps, muscle weakness and myalgias.   Gastrointestinal:  Negative for bloating, abdominal pain, change in bowel habit, diarrhea, heartburn, hematemesis, hematochezia, melena and nausea.   Neurological:  Negative for excessive daytime sleepiness, dizziness, headaches, light-headedness, loss of balance, numbness and weakness.   Psychiatric/Behavioral:  Negative for memory loss. The patient does not have insomnia.    Allergic/Immunologic: Negative for hives.     Objective:   Physical Exam  Constitutional:       General: She is not in acute distress.     Appearance: Normal appearance. She is well-developed. She is not ill-appearing.   HENT:      Head: Normocephalic and  "atraumatic.   Eyes:      General: No scleral icterus.     Pupils: Pupils are equal, round, and reactive to light.   Neck:      Thyroid: No thyromegaly.      Vascular: Normal carotid pulses. No carotid bruit, hepatojugular reflux or JVD.      Trachea: No tracheal deviation.   Cardiovascular:      Rate and Rhythm: Normal rate and regular rhythm.      Pulses: Normal pulses.      Heart sounds: Normal heart sounds. No murmur heard.    No friction rub. No gallop.   Pulmonary:      Effort: Pulmonary effort is normal. No respiratory distress.      Breath sounds: Normal breath sounds. No wheezing, rhonchi or rales.   Chest:      Chest wall: No tenderness.   Abdominal:      General: Bowel sounds are normal. There is no abdominal bruit.      Palpations: Abdomen is soft. There is no hepatomegaly or pulsatile mass.      Tenderness: There is no abdominal tenderness.   Musculoskeletal:      Right shoulder: No deformity.      Cervical back: Normal range of motion and neck supple.   Skin:     General: Skin is warm and dry.      Findings: No erythema or rash.      Nails: There is no clubbing.   Neurological:      Mental Status: She is alert and oriented to person, place, and time.      Cranial Nerves: No cranial nerve deficit.      Coordination: Coordination normal.   Psychiatric:         Speech: Speech normal.         Behavior: Behavior normal.     Vitals:    01/06/23 1105 01/06/23 1106   BP: (!) 158/82 (!) 154/90   BP Location: Right arm    Patient Position: Sitting    BP Method: Medium (Automatic)    Pulse: 70    SpO2: 98%    Weight: 79.8 kg (175 lb 14.8 oz)    Height: 5' 4" (1.626 m)      Lab Results   Component Value Date    CHOL 212 (H) 12/06/2022    CHOL 158 06/02/2022    CHOL 224 (H) 06/17/2021     Lab Results   Component Value Date    HDL 68 12/06/2022    HDL 70 06/02/2022    HDL 61 06/17/2021     Lab Results   Component Value Date    LDLCALC 125.4 12/06/2022    LDLCALC 75.2 06/02/2022    LDLCALC 136.6 06/17/2021     Lab " Results   Component Value Date    TRIG 93 12/06/2022    TRIG 64 06/02/2022    TRIG 132 06/17/2021     Lab Results   Component Value Date    CHOLHDL 32.1 12/06/2022    CHOLHDL 44.3 06/02/2022    CHOLHDL 27.2 06/17/2021       Chemistry        Component Value Date/Time     12/06/2022 0901    K 4.1 12/06/2022 0901     12/06/2022 0901    CO2 25 12/06/2022 0901    BUN 16 12/06/2022 0901    CREATININE 0.7 12/28/2022 0935    GLU 91 12/06/2022 0901        Component Value Date/Time    CALCIUM 9.7 12/06/2022 0901    ALKPHOS 145 (H) 12/06/2022 0901    AST 42 (H) 12/06/2022 0901    ALT 93 (H) 12/06/2022 0901    BILITOT 0.7 12/06/2022 0901    ESTGFRAFRICA >60.0 07/25/2022 1037    EGFRNONAA >60.0 07/25/2022 1037          Lab Results   Component Value Date    TSH 2.561 06/02/2022     No results found for: INR, PROTIME  Lab Results   Component Value Date    WBC 4.74 08/29/2022    HGB 12.9 08/29/2022    HCT 39.8 08/29/2022    MCV 95 08/29/2022     08/29/2022     BMP  Sodium   Date Value Ref Range Status   12/06/2022 140 136 - 145 mmol/L Final     Potassium   Date Value Ref Range Status   12/06/2022 4.1 3.5 - 5.1 mmol/L Final     Chloride   Date Value Ref Range Status   12/06/2022 106 95 - 110 mmol/L Final     CO2   Date Value Ref Range Status   12/06/2022 25 23 - 29 mmol/L Final     BUN   Date Value Ref Range Status   12/06/2022 16 6 - 20 mg/dL Final     Creatinine   Date Value Ref Range Status   12/28/2022 0.7 0.5 - 1.4 mg/dL Final     Calcium   Date Value Ref Range Status   12/06/2022 9.7 8.7 - 10.5 mg/dL Final     Anion Gap   Date Value Ref Range Status   12/06/2022 9 8 - 16 mmol/L Final     eGFR if    Date Value Ref Range Status   07/25/2022 >60.0 >60 mL/min/1.73 m^2 Final     eGFR if non    Date Value Ref Range Status   07/25/2022 >60.0 >60 mL/min/1.73 m^2 Final     Comment:     Calculation used to obtain the estimated glomerular filtration  rate (eGFR) is the CKD-EPI equation.         CrCl cannot be calculated (Patient's most recent lab result is older than the maximum 7 days allowed.).    Assessment:     1. Spontaneous dissection of coronary artery    2. Abnormal EKG    3. Fibromuscular dysplasia    4. ST elevation myocardial infarction involving left anterior descending (LAD) coronary artery    5. Adenocarcinoma of colon    6. Colon adenocarcinoma    7. Elevated LFTs    8. Lymphocytic colitis    9. Allergy to statin medication      Scad wise she is asymptomatic will continue risk factor modification  Hlp with major allergy to statins will try conservative therapy for now weight loss exercise  diet compliance she will try cholest off over the counter.  Htn seems uncontrolled will add losartan 25 mg po daily low salt diet emphasized.  Weight loss discussed and regular exercise.   Plan:   Losartan 25 mg po daily    Continue toprol   Weight loss   Exercise    Low salt diet    Bp ledger to review.   6 months with labs,.

## 2023-01-29 ENCOUNTER — PATIENT MESSAGE (OUTPATIENT)
Dept: CARDIOLOGY | Facility: CLINIC | Age: 58
End: 2023-01-29
Payer: COMMERCIAL

## 2023-02-08 ENCOUNTER — LAB VISIT (OUTPATIENT)
Dept: LAB | Facility: HOSPITAL | Age: 58
End: 2023-02-08
Attending: FAMILY MEDICINE
Payer: COMMERCIAL

## 2023-02-08 DIAGNOSIS — R74.8 ELEVATED LIVER ENZYMES: ICD-10-CM

## 2023-02-08 LAB
ALBUMIN SERPL BCP-MCNC: 4 G/DL (ref 3.5–5.2)
ALP SERPL-CCNC: 112 U/L (ref 55–135)
ALT SERPL W/O P-5'-P-CCNC: 39 U/L (ref 10–44)
ANION GAP SERPL CALC-SCNC: 10 MMOL/L (ref 8–16)
AST SERPL-CCNC: 27 U/L (ref 10–40)
BILIRUB SERPL-MCNC: 0.3 MG/DL (ref 0.1–1)
BUN SERPL-MCNC: 15 MG/DL (ref 6–20)
CALCIUM SERPL-MCNC: 9.6 MG/DL (ref 8.7–10.5)
CHLORIDE SERPL-SCNC: 103 MMOL/L (ref 95–110)
CO2 SERPL-SCNC: 26 MMOL/L (ref 23–29)
CREAT SERPL-MCNC: 0.8 MG/DL (ref 0.5–1.4)
EST. GFR  (NO RACE VARIABLE): >60 ML/MIN/1.73 M^2
GLUCOSE SERPL-MCNC: 87 MG/DL (ref 70–110)
POTASSIUM SERPL-SCNC: 4.3 MMOL/L (ref 3.5–5.1)
PROT SERPL-MCNC: 7.1 G/DL (ref 6–8.4)
SODIUM SERPL-SCNC: 139 MMOL/L (ref 136–145)

## 2023-02-08 PROCEDURE — 80053 COMPREHEN METABOLIC PANEL: CPT | Performed by: FAMILY MEDICINE

## 2023-02-08 PROCEDURE — 36415 COLL VENOUS BLD VENIPUNCTURE: CPT | Mod: PO | Performed by: FAMILY MEDICINE

## 2023-04-02 ENCOUNTER — PATIENT MESSAGE (OUTPATIENT)
Dept: INTERNAL MEDICINE | Facility: CLINIC | Age: 58
End: 2023-04-02
Payer: COMMERCIAL

## 2023-04-03 RX ORDER — TIZANIDINE HYDROCHLORIDE 4 MG/1
1 CAPSULE, GELATIN COATED ORAL 3 TIMES DAILY PRN
Qty: 30 CAPSULE | Refills: 0 | Status: SHIPPED | OUTPATIENT
Start: 2023-04-03 | End: 2023-11-15

## 2023-04-03 NOTE — TELEPHONE ENCOUNTER
No new care gaps identified.  [facility] Embedded Care Gaps. Reference number: [id number]. 4/03/2023   8:46:29 AM CDT

## 2023-04-17 ENCOUNTER — OFFICE VISIT (OUTPATIENT)
Dept: SURGERY | Facility: CLINIC | Age: 58
End: 2023-04-17
Payer: COMMERCIAL

## 2023-04-17 VITALS — SYSTOLIC BLOOD PRESSURE: 147 MMHG | DIASTOLIC BLOOD PRESSURE: 79 MMHG | HEART RATE: 76 BPM

## 2023-04-17 DIAGNOSIS — C18.9 COLON ADENOCARCINOMA: Primary | ICD-10-CM

## 2023-04-17 PROCEDURE — 1159F MED LIST DOCD IN RCRD: CPT | Mod: CPTII,S$GLB,, | Performed by: COLON & RECTAL SURGERY

## 2023-04-17 PROCEDURE — 3077F PR MOST RECENT SYSTOLIC BLOOD PRESSURE >= 140 MM HG: ICD-10-PCS | Mod: CPTII,S$GLB,, | Performed by: COLON & RECTAL SURGERY

## 2023-04-17 PROCEDURE — 4010F ACE/ARB THERAPY RXD/TAKEN: CPT | Mod: CPTII,S$GLB,, | Performed by: COLON & RECTAL SURGERY

## 2023-04-17 PROCEDURE — 3078F PR MOST RECENT DIASTOLIC BLOOD PRESSURE < 80 MM HG: ICD-10-PCS | Mod: CPTII,S$GLB,, | Performed by: COLON & RECTAL SURGERY

## 2023-04-17 PROCEDURE — 99999 PR PBB SHADOW E&M-EST. PATIENT-LVL III: CPT | Mod: PBBFAC,,, | Performed by: COLON & RECTAL SURGERY

## 2023-04-17 PROCEDURE — 3078F DIAST BP <80 MM HG: CPT | Mod: CPTII,S$GLB,, | Performed by: COLON & RECTAL SURGERY

## 2023-04-17 PROCEDURE — 99214 OFFICE O/P EST MOD 30 MIN: CPT | Mod: S$GLB,,, | Performed by: COLON & RECTAL SURGERY

## 2023-04-17 PROCEDURE — 4010F PR ACE/ARB THEARPY RXD/TAKEN: ICD-10-PCS | Mod: CPTII,S$GLB,, | Performed by: COLON & RECTAL SURGERY

## 2023-04-17 PROCEDURE — 1159F PR MEDICATION LIST DOCUMENTED IN MEDICAL RECORD: ICD-10-PCS | Mod: CPTII,S$GLB,, | Performed by: COLON & RECTAL SURGERY

## 2023-04-17 PROCEDURE — 99214 PR OFFICE/OUTPT VISIT, EST, LEVL IV, 30-39 MIN: ICD-10-PCS | Mod: S$GLB,,, | Performed by: COLON & RECTAL SURGERY

## 2023-04-17 PROCEDURE — 3077F SYST BP >= 140 MM HG: CPT | Mod: CPTII,S$GLB,, | Performed by: COLON & RECTAL SURGERY

## 2023-04-17 PROCEDURE — 99999 PR PBB SHADOW E&M-EST. PATIENT-LVL III: ICD-10-PCS | Mod: PBBFAC,,, | Performed by: COLON & RECTAL SURGERY

## 2023-04-17 NOTE — PROGRESS NOTES
History & Physical    SUBJECTIVE:     CC: rectosigmoid adenocarcinoma  Ref MD: Rianna Dillon MD    History of Present Illness:  Patient is a 58 y.o. 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  Patient had a positive fit test in June 2020 which prompted the scheduling of a colonoscopy.  This colonoscopy was performed in August 2020 and showed a mass in the rectosigmoid region with biopsies resulting in adenocarcinoma.  Patient states that she had seen intermittent blood in her stool for 3 months prior to this.  This was her 1st colonoscopy ever.  She denies any associated fever, chills, nausea, vomiting, diarrhea, constipation, weight loss or night sweats.  She has no known personal family history of colorectal cancer or colonic polyps previously.  Her only significant abdominal surgical history is an open ovarian cyst removal via Pfannenstiel incision.    10/8/2020: Lap LAR (Stage 1: T2N0 adenoCA)    Interval history:  Since last clinic visit, the patient continues to do well.  She is tolerating regular diet and having normal bowel function.  She denies any fever, chills, nausea, vomiting, hematochezia or melena.    Review of patient's allergies indicates:  No Known Allergies    Current Outpatient Medications   Medication Sig Dispense Refill    aspirin 81 mg Cap 81 MG  .      metoprolol succinate (TOPROL-XL) 25 MG 24 hr tablet Take 1 tablet (25 mg total) by mouth once daily. 30 tablet 11    tiZANidine 4 mg Cap Take 1 capsule by mouth 3 (three) times daily as needed. 30 capsule 0    atorvastatin (LIPITOR) 20 MG tablet Take 1 tablet (20 mg total) by mouth once daily. (Patient not taking: Reported on 1/6/2023) 90 tablet 3    losartan (COZAAR) 25 MG tablet Take 1 tablet (25 mg total) by mouth once daily. (Patient not taking: Reported on 4/17/2023) 90 tablet 3     No current facility-administered medications for this visit.       Past Medical History:   Diagnosis Date    Adenocarcinoma of colon      Colon cancer 2020    Heart attack 01/22/2022    PONV (postoperative nausea and vomiting)      Past Surgical History:   Procedure Laterality Date    COLONOSCOPY N/A 8/25/2020    Procedure: COLONOSCOPY;  Surgeon: Rianna Dillon MD;  Location: [facility];  Service: Endoscopy;  Laterality: N/A;    COLONOSCOPY N/A 1/7/2022    Procedure: COLONOSCOPY;  Surgeon: Ziggy Hannah MD;  Location: [facility] ENDO;  Service: General;  Laterality: N/A;    DIAGNOSTIC LAPAROSCOPY N/A 9/7/2022    Procedure: LAPAROSCOPY, DIAGNOSTIC;  Surgeon: Ziggy Hannah MD;  Location: [facility] OR;  Service: General;  Laterality: N/A;    FLEXIBLE SIGMOIDOSCOPY N/A 10/8/2020    Procedure: SIGMOIDOSCOPY, FLEXIBLE;  Surgeon: Ziggy Hannah MD;  Location: [facility] OR;  Service: General;  Laterality: N/A;    INJECTION OF ANESTHETIC AGENT INTO TISSUE PLANE DEFINED BY TRANSVERSUS ABDOMINIS MUSCLE N/A 10/8/2020    Procedure: BLOCK, TRANSVERSUS ABDOMINIS PLANE;  Surgeon: Ziggy Hannah MD;  Location: [facility];  Service: General;  Laterality: N/A;    LAPAROSCOPIC LYSIS OF ADHESIONS  9/7/2022    Procedure: LYSIS, ADHESIONS, LAPAROSCOPIC;  Surgeon: Ziggy Hannah MD;  Location: [facility] OR;  Service: General;;    LAPAROSCOPIC SIGMOIDECTOMY N/A 10/8/2020    Procedure: COLECTOMY, SIGMOID, LAPAROSCOPIC;  Surgeon: Ziggy Hannah MD;  Location: [facility] OR;  Service: General;  Laterality: N/A;  lower anterior resection  Proctosigmoidectomy    LAPAROTOMY N/A 10/8/2020    Procedure: LAPAROTOMY;  Surgeon: Ziggy Hannah MD;  Location: [facility] OR;  Service: General;  Laterality: N/A;    OVARIAN CYST REMOVAL      WISDOM TOOTH EXTRACTION      XI ROBOTIC HYSTERECTOMY, WITH SALPINGO-OOPHORECTOMY Bilateral 9/7/2022    Procedure: XI ROBOTIC HYSTERECTOMY,WITH SALPINGO-OOPHORECTOMY;  Surgeon: Bela Noel MD;  Location: [facility];  Service: OB/GYN;  Laterality: Bilateral;     Family History   Problem Relation Age of Onset    Fibromyalgia Sister     Diabetes Neg Hx      Heart disease Neg Hx      Social History     Tobacco Use    Smoking status: Never    Smokeless tobacco: Never   Substance Use Topics    Alcohol use: Yes     Comment: Occ use; HOLD 72HRS PRIOR TO SURGERY    Drug use: No        Review of Systems:  Review of Systems   Constitutional:  Negative for activity change, appetite change, chills, fatigue, fever and unexpected weight change.   HENT:  Negative for congestion, ear pain, sore throat and trouble swallowing.    Eyes:  Negative for pain, redness and itching.   Respiratory:  Negative for cough, shortness of breath and wheezing.    Cardiovascular:  Negative for chest pain, palpitations and leg swelling.   Gastrointestinal:  Negative for abdominal distention, abdominal pain, blood in stool, constipation, diarrhea, nausea, rectal pain and vomiting.   Endocrine: Negative for cold intolerance, heat intolerance and polyuria.   Genitourinary:  Negative for dysuria, flank pain, frequency and hematuria.   Musculoskeletal:  Negative for gait problem, joint swelling and neck pain.   Skin:  Negative for color change, rash and wound.   Allergic/Immunologic: Negative for environmental allergies and immunocompromised state.   Neurological:  Negative for dizziness, speech difficulty, weakness and numbness.   Psychiatric/Behavioral:  Negative for agitation, confusion and hallucinations.      OBJECTIVE:     Vital Signs (Most Recent)  Pulse: 76 (04/17/23 1147)  BP: (!) 147/79 (04/17/23 1147)           Physical Exam:  Physical Exam  Constitutional:       Appearance: She is well-developed.   HENT:      Head: Normocephalic and atraumatic.   Eyes:      Conjunctiva/sclera: Conjunctivae normal.   Neck:      Thyroid: No thyromegaly.   Cardiovascular:      Rate and Rhythm: Normal rate and regular rhythm.   Pulmonary:      Effort: Pulmonary effort is normal. No respiratory distress.   Abdominal:      General: There is no distension.      Palpations: Abdomen is soft. There is no mass.       Tenderness: There is no abdominal tenderness.      Comments: Well-healed incisions without erythema or drainage   Musculoskeletal:         General: No tenderness. Normal range of motion.      Cervical back: Normal range of motion.   Skin:     General: Skin is warm and dry.      Capillary Refill: Capillary refill takes less than 2 seconds.      Findings: No rash.   Neurological:      Mental Status: She is alert and oriented to person, place, and time.       Laboratory  Lab Results   Component Value Date    WBC 3.88 (L) 02/08/2023    HGB 12.5 02/08/2023    HCT 39.0 02/08/2023     02/08/2023    CHOL 212 (H) 12/06/2022    TRIG 93 12/06/2022    HDL 68 12/06/2022    ALT 39 02/08/2023    AST 27 02/08/2023     02/08/2023    K 4.3 02/08/2023     02/08/2023    CREATININE 0.8 02/08/2023    BUN 15 02/08/2023    CO2 26 02/08/2023    TSH 2.561 06/02/2022    HGBA1C 5.5 06/02/2022     Component Ref Range & Units 2 mo ago  (2/8/23) 6 mo ago  (10/17/22) 8 mo ago  (7/25/22) 1 yr ago  (4/11/22) 1 yr ago  (10/11/21) 1 yr ago  (6/17/21) 2 yr ago  (4/12/21)   CEA 0.0 - 5.0 ng/mL 2.0  2.0 CM  <1.7 CM  1.7 CM  2.1 CM  2.0 CM  1.6 CM        Diagnostic Results:  CT: Reviewed     CT Dec 2022:  FINDINGS:  CHEST     No infiltrates or pleural effusions are identified.  There are a couple of stable subpleural less than 3 mm noncalcified nodule seen within either lung.  There are also a couple of tiny calcified granulomas seen within either lung.     The ascending aorta is mildly dilated but not considered aneurysmal and measures approximately 3.6 cm.  There is no pericardial effusion.  No enlarged mediastinal, hilar or axillary lymph nodes are identified.     ABDOMEN     Liver/gallbladder/biliary: The liver demonstrates no focal abnormality. The gallbladder is present and unremarkable.  No biliary ductal dilation.     Pancreas: The pancreas is unremarkable in appearance.     Spleen: The spleen is not enlarged.     Adrenals:  Unremarkable     Kidneys: The kidneys are equally perfused and demonstrate no solid masses.  Small probable cyst seen within the upper pole of the left kidney.  There is a nonobstructing upper pole left renal calculus that measures approximately 3 mm in size.     Bowel/Mesentery: There is no evidence of bowel obstruction.  Mild constipation within the rectosigmoid postsurgical changes seen in the region of the sigmoid colon with the linear intraluminal density noted in the region of postsurgical changes as well.  No mesenteric stranding or adenopathy.     Retroperitoneum: Shotty nonenlarged adenopathy.  The aorta demonstrates a normal caliber.     PELVIS     Genitourinary/Reproductive organs: Suspect post hysterectomy.     Adenopathy: None     Free Fluid: No free fluid     Osseus Structures/Soft tissues: No suspicious appearing osseus lesions. No significant soft tissue abnormality.  Small fat containing umbilical hernia.     Impression:     No evidence of recurrent or metastatic disease.    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

## 2023-06-20 ENCOUNTER — OFFICE VISIT (OUTPATIENT)
Dept: INTERNAL MEDICINE | Facility: CLINIC | Age: 58
End: 2023-06-20
Payer: COMMERCIAL

## 2023-06-20 VITALS
OXYGEN SATURATION: 98 % | HEART RATE: 66 BPM | HEIGHT: 64 IN | WEIGHT: 168 LBS | DIASTOLIC BLOOD PRESSURE: 76 MMHG | BODY MASS INDEX: 28.68 KG/M2 | TEMPERATURE: 98 F | SYSTOLIC BLOOD PRESSURE: 132 MMHG

## 2023-06-20 DIAGNOSIS — Z00.00 ANNUAL PHYSICAL EXAM: Primary | ICD-10-CM

## 2023-06-20 PROCEDURE — 3008F PR BODY MASS INDEX (BMI) DOCUMENTED: ICD-10-PCS | Mod: CPTII,S$GLB,, | Performed by: FAMILY MEDICINE

## 2023-06-20 PROCEDURE — 1159F MED LIST DOCD IN RCRD: CPT | Mod: CPTII,S$GLB,, | Performed by: FAMILY MEDICINE

## 2023-06-20 PROCEDURE — 4010F ACE/ARB THERAPY RXD/TAKEN: CPT | Mod: CPTII,S$GLB,, | Performed by: FAMILY MEDICINE

## 2023-06-20 PROCEDURE — 1160F RVW MEDS BY RX/DR IN RCRD: CPT | Mod: CPTII,S$GLB,, | Performed by: FAMILY MEDICINE

## 2023-06-20 PROCEDURE — 1159F PR MEDICATION LIST DOCUMENTED IN MEDICAL RECORD: ICD-10-PCS | Mod: CPTII,S$GLB,, | Performed by: FAMILY MEDICINE

## 2023-06-20 PROCEDURE — 3075F SYST BP GE 130 - 139MM HG: CPT | Mod: CPTII,S$GLB,, | Performed by: FAMILY MEDICINE

## 2023-06-20 PROCEDURE — 3078F DIAST BP <80 MM HG: CPT | Mod: CPTII,S$GLB,, | Performed by: FAMILY MEDICINE

## 2023-06-20 PROCEDURE — 99396 PR PREVENTIVE VISIT,EST,40-64: ICD-10-PCS | Mod: S$GLB,,, | Performed by: FAMILY MEDICINE

## 2023-06-20 PROCEDURE — 99999 PR PBB SHADOW E&M-EST. PATIENT-LVL IV: ICD-10-PCS | Mod: PBBFAC,,, | Performed by: FAMILY MEDICINE

## 2023-06-20 PROCEDURE — 99999 PR PBB SHADOW E&M-EST. PATIENT-LVL IV: CPT | Mod: PBBFAC,,, | Performed by: FAMILY MEDICINE

## 2023-06-20 PROCEDURE — 3078F PR MOST RECENT DIASTOLIC BLOOD PRESSURE < 80 MM HG: ICD-10-PCS | Mod: CPTII,S$GLB,, | Performed by: FAMILY MEDICINE

## 2023-06-20 PROCEDURE — 99396 PREV VISIT EST AGE 40-64: CPT | Mod: S$GLB,,, | Performed by: FAMILY MEDICINE

## 2023-06-20 PROCEDURE — 4010F PR ACE/ARB THEARPY RXD/TAKEN: ICD-10-PCS | Mod: CPTII,S$GLB,, | Performed by: FAMILY MEDICINE

## 2023-06-20 PROCEDURE — 3075F PR MOST RECENT SYSTOLIC BLOOD PRESS GE 130-139MM HG: ICD-10-PCS | Mod: CPTII,S$GLB,, | Performed by: FAMILY MEDICINE

## 2023-06-20 PROCEDURE — 1160F PR REVIEW ALL MEDS BY PRESCRIBER/CLIN PHARMACIST DOCUMENTED: ICD-10-PCS | Mod: CPTII,S$GLB,, | Performed by: FAMILY MEDICINE

## 2023-06-20 PROCEDURE — 3008F BODY MASS INDEX DOCD: CPT | Mod: CPTII,S$GLB,, | Performed by: FAMILY MEDICINE

## 2023-06-20 RX ORDER — METOPROLOL TARTRATE 25 MG/1
25 TABLET, FILM COATED ORAL
COMMUNITY
End: 2023-06-20 | Stop reason: SDUPTHER

## 2023-06-20 NOTE — PROGRESS NOTES
Subjective:      Patient ID: Jhoana Sierra is a 58 y.o. female.    Chief Complaint: Annual Exam (Left foot pain )    HPI  59 yo here for annual.  Followed by GI/Gyn/Cards  Not on statin//caused elevated liver enz.  Taking cholestoff  Having foot pain/achilles pain//chronic    Past Medical History:   Diagnosis Date    Adenocarcinoma of colon     Colon cancer 2020    Heart attack 01/22/2022    PONV (postoperative nausea and vomiting)      Family History   Problem Relation Age of Onset    Fibromyalgia Sister     Diabetes Neg Hx     Heart disease Neg Hx      Past Surgical History:   Procedure Laterality Date    COLONOSCOPY N/A 08/25/2020    Procedure: COLONOSCOPY;  Surgeon: Rianna Dillon MD;  Location: [facility];  Service: Endoscopy;  Laterality: N/A;    COLONOSCOPY N/A 01/07/2022    Procedure: COLONOSCOPY;  Surgeon: Ziggy Hannah MD;  Location: [facility];  Service: General;  Laterality: N/A;    DIAGNOSTIC LAPAROSCOPY N/A 09/07/2022    Procedure: LAPAROSCOPY, DIAGNOSTIC;  Surgeon: Ziggy Hannah MD;  Location: [facility] OR;  Service: General;  Laterality: N/A;    FLEXIBLE SIGMOIDOSCOPY N/A 10/08/2020    Procedure: SIGMOIDOSCOPY, FLEXIBLE;  Surgeon: Ziggy Hannah MD;  Location: [facility] OR;  Service: General;  Laterality: N/A;    HYSTERECTOMY      INJECTION OF ANESTHETIC AGENT INTO TISSUE PLANE DEFINED BY TRANSVERSUS ABDOMINIS MUSCLE N/A 10/08/2020    Procedure: BLOCK, TRANSVERSUS ABDOMINIS PLANE;  Surgeon: Ziggy Hannah MD;  Location: [facility] OR;  Service: General;  Laterality: N/A;    LAPAROSCOPIC LYSIS OF ADHESIONS  09/07/2022    Procedure: LYSIS, ADHESIONS, LAPAROSCOPIC;  Surgeon: Ziggy Hannah MD;  Location: [facility] OR;  Service: General;;    LAPAROSCOPIC SIGMOIDECTOMY N/A 10/08/2020    Procedure: COLECTOMY, SIGMOID, LAPAROSCOPIC;  Surgeon: Ziggy Hannah MD;  Location: [facility] OR;  Service: General;  Laterality: N/A;  lower anterior resection  Proctosigmoidectomy    LAPAROTOMY N/A 10/08/2020     "Procedure: LAPAROTOMY;  Surgeon: Ziggy Hannah MD;  Location: [facility];  Service: General;  Laterality: N/A;    OVARIAN CYST REMOVAL      WISDOM TOOTH EXTRACTION      XI ROBOTIC HYSTERECTOMY, WITH SALPINGO-OOPHORECTOMY Bilateral 09/07/2022    Procedure: XI ROBOTIC HYSTERECTOMY,WITH SALPINGO-OOPHORECTOMY;  Surgeon: Bela Noel MD;  Location: [facility];  Service: OB/GYN;  Laterality: Bilateral;     Social History     Tobacco Use    Smoking status: Never    Smokeless tobacco: Never   Substance Use Topics    Alcohol use: Yes     Comment: Occ use; HOLD 72HRS PRIOR TO SURGERY    Drug use: No       /76   Pulse 66   Temp 98.3 °F (36.8 °C) (Tympanic)   Ht 5' 4" (1.626 m)   Wt 76.2 kg (167 lb 15.9 oz)   LMP 08/25/2017   SpO2 98%   BMI 28.84 kg/m²     Review of Systems   Constitutional:  Negative for activity change and unexpected weight change.   HENT:  Negative for hearing loss, rhinorrhea and trouble swallowing.    Eyes:  Negative for discharge and visual disturbance.   Respiratory:  Negative for chest tightness and wheezing.    Cardiovascular:  Negative for chest pain and palpitations.   Gastrointestinal:  Negative for blood in stool, constipation, diarrhea and vomiting.   Endocrine: Negative for polydipsia and polyuria.   Genitourinary:  Negative for difficulty urinating, dysuria, hematuria and menstrual problem.   Musculoskeletal:  Negative for arthralgias, joint swelling and neck pain.   Neurological:  Negative for weakness and headaches.   Psychiatric/Behavioral:  Negative for confusion and dysphoric mood.      Objective:     Physical Exam  Vitals and nursing note reviewed.   Constitutional:       General: She is not in acute distress.     Appearance: She is well-developed.   HENT:      Right Ear: External ear normal.      Left Ear: External ear normal.      Nose: Nose normal.   Eyes:      Conjunctiva/sclera: Conjunctivae normal.      Pupils: Pupils are equal, round, and reactive to light.   Neck: "      Thyroid: No thyromegaly.   Cardiovascular:      Rate and Rhythm: Normal rate and regular rhythm.      Heart sounds: Normal heart sounds.   Pulmonary:      Effort: Pulmonary effort is normal. No respiratory distress.      Breath sounds: Normal breath sounds. No wheezing or rales.   Abdominal:      General: Bowel sounds are normal. There is no distension.      Palpations: Abdomen is soft.      Tenderness: There is no abdominal tenderness. There is no guarding.   Musculoskeletal:         General: Normal range of motion.      Cervical back: Normal range of motion and neck supple.   Skin:     General: Skin is warm and dry.      Findings: No rash.   Neurological:      Mental Status: She is alert and oriented to person, place, and time.      Cranial Nerves: No cranial nerve deficit.   Psychiatric:         Behavior: Behavior normal.         Thought Content: Thought content normal.         Judgment: Judgment normal.       Lab Results   Component Value Date    WBC 3.88 (L) 02/08/2023    HGB 12.5 02/08/2023    HCT 39.0 02/08/2023     02/08/2023    CHOL 212 (H) 12/06/2022    TRIG 93 12/06/2022    HDL 68 12/06/2022    ALT 39 02/08/2023    AST 27 02/08/2023     02/08/2023    K 4.3 02/08/2023     02/08/2023    CREATININE 0.8 02/08/2023    BUN 15 02/08/2023    CO2 26 02/08/2023    TSH 2.561 06/02/2022    HGBA1C 5.5 06/02/2022       Assessment:     1. Annual physical exam         Plan:     Annual physical exam  -     CBC Auto Differential; Future; Expected date: 07/07/2023  -     Hemoglobin A1C; Future; Expected date: 07/07/2023  -     TSH; Future; Expected date: 07/07/2023    Will add screening labs for July 7  Vitals are stable  Healthy lifestyle  See Podiatry for foot  Fu annually and PRN

## 2023-06-23 DIAGNOSIS — I25.42 SPONTANEOUS DISSECTION OF CORONARY ARTERY: Primary | ICD-10-CM

## 2023-06-30 ENCOUNTER — OFFICE VISIT (OUTPATIENT)
Dept: PODIATRY | Facility: CLINIC | Age: 58
End: 2023-06-30
Payer: COMMERCIAL

## 2023-06-30 VITALS — HEIGHT: 64 IN | WEIGHT: 167 LBS | BODY MASS INDEX: 28.51 KG/M2

## 2023-06-30 DIAGNOSIS — M76.62 LEFT ACHILLES TENDINITIS: Primary | ICD-10-CM

## 2023-06-30 DIAGNOSIS — M77.41 METATARSALGIA, RIGHT FOOT: ICD-10-CM

## 2023-06-30 PROCEDURE — 99213 OFFICE O/P EST LOW 20 MIN: CPT | Mod: S$GLB,,, | Performed by: PODIATRIST

## 2023-06-30 PROCEDURE — 1159F PR MEDICATION LIST DOCUMENTED IN MEDICAL RECORD: ICD-10-PCS | Mod: CPTII,S$GLB,, | Performed by: PODIATRIST

## 2023-06-30 PROCEDURE — 99213 PR OFFICE/OUTPT VISIT, EST, LEVL III, 20-29 MIN: ICD-10-PCS | Mod: S$GLB,,, | Performed by: PODIATRIST

## 2023-06-30 PROCEDURE — 4010F PR ACE/ARB THEARPY RXD/TAKEN: ICD-10-PCS | Mod: CPTII,S$GLB,, | Performed by: PODIATRIST

## 2023-06-30 PROCEDURE — 1159F MED LIST DOCD IN RCRD: CPT | Mod: CPTII,S$GLB,, | Performed by: PODIATRIST

## 2023-06-30 PROCEDURE — 1160F RVW MEDS BY RX/DR IN RCRD: CPT | Mod: CPTII,S$GLB,, | Performed by: PODIATRIST

## 2023-06-30 PROCEDURE — 3008F BODY MASS INDEX DOCD: CPT | Mod: CPTII,S$GLB,, | Performed by: PODIATRIST

## 2023-06-30 PROCEDURE — 4010F ACE/ARB THERAPY RXD/TAKEN: CPT | Mod: CPTII,S$GLB,, | Performed by: PODIATRIST

## 2023-06-30 PROCEDURE — 3008F PR BODY MASS INDEX (BMI) DOCUMENTED: ICD-10-PCS | Mod: CPTII,S$GLB,, | Performed by: PODIATRIST

## 2023-06-30 PROCEDURE — 99999 PR PBB SHADOW E&M-EST. PATIENT-LVL III: ICD-10-PCS | Mod: PBBFAC,,, | Performed by: PODIATRIST

## 2023-06-30 PROCEDURE — 1160F PR REVIEW ALL MEDS BY PRESCRIBER/CLIN PHARMACIST DOCUMENTED: ICD-10-PCS | Mod: CPTII,S$GLB,, | Performed by: PODIATRIST

## 2023-06-30 PROCEDURE — 99999 PR PBB SHADOW E&M-EST. PATIENT-LVL III: CPT | Mod: PBBFAC,,, | Performed by: PODIATRIST

## 2023-06-30 NOTE — PROGRESS NOTES
Subjective:       Patient ID: Jhoana Sierra is a 58 y.o. female.    Chief Complaint: Heel Pain (C/o left heel pain, posterior aspect, x couple years ago, past history of injury, rates pain 3/10, walking makes pain worse, pt also c/o right metatarsal pain, currently 0 pain, non diabetic wears casual shoes, Last seen PCP Dr. Nguyen 06/20/2023)      HPI: Jhoana Sierra complains of mild to moderate pains to the right posterior aspect of the ankle/lower leg. States pains are sharp and stabbing-like in nature. Pains are to the posterior aspect of the ankle joint, mostly with walking and standing. Rates the pains at approx. 3/10. States post-static dyskinesia to this area. Denies any recent identifiable trauma symptoms have been on and off for some time.  States that she had worsening posterior Achilles pain after visiting [employer]. Patient's Primary Care Provider is Dhruv Nguyen MD.     Review of patient's allergies indicates:  No Known Allergies    Past Medical History:   Diagnosis Date    Adenocarcinoma of colon     Colon cancer 2020    Heart attack 01/22/2022    PONV (postoperative nausea and vomiting)        Family History   Problem Relation Age of Onset    Fibromyalgia Sister     Diabetes Neg Hx     Heart disease Neg Hx        Social History     Socioeconomic History    Marital status:     Number of children: 2   Occupational History    Occupation: Not working currently   Tobacco Use    Smoking status: Never    Smokeless tobacco: Never   Substance and Sexual Activity    Alcohol use: Yes     Comment: Occ use; HOLD 72HRS PRIOR TO SURGERY    Drug use: No    Sexual activity: Yes     Partners: Male     Birth control/protection: Post-menopausal     Social Determinants of Health     Financial Resource Strain: Low Risk     Difficulty of Paying Living Expenses: Not hard at all   Food Insecurity: No Food Insecurity    Worried About Running Out of Food in the Last Year: Never true    Ran Out of Food in the  Last Year: Never true   Transportation Needs: No Transportation Needs    Lack of Transportation (Medical): No    Lack of Transportation (Non-Medical): No   Physical Activity: Insufficiently Active    Days of Exercise per Week: 3 days    Minutes of Exercise per Session: 20 min   Stress: No Stress Concern Present    Feeling of Stress : Not at all   Social Connections: Unknown    Frequency of Communication with Friends and Family: More than three times a week    Frequency of Social Gatherings with Friends and Family: More than three times a week    Active Member of Clubs or Organizations: No    Attends Club or Organization Meetings: Never    Marital Status:    Housing Stability: Low Risk     Unable to Pay for Housing in the Last Year: No    Number of Places Lived in the Last Year: 1    Unstable Housing in the Last Year: No       Past Surgical History:   Procedure Laterality Date    COLONOSCOPY N/A 08/25/2020    Procedure: COLONOSCOPY;  Surgeon: Rianna Dillon MD;  Location: [facility];  Service: Endoscopy;  Laterality: N/A;    COLONOSCOPY N/A 01/07/2022    Procedure: COLONOSCOPY;  Surgeon: Ziggy Hannah MD;  Location: [facility];  Service: General;  Laterality: N/A;    DIAGNOSTIC LAPAROSCOPY N/A 09/07/2022    Procedure: LAPAROSCOPY, DIAGNOSTIC;  Surgeon: Ziggy Hannah MD;  Location: [facility];  Service: General;  Laterality: N/A;    FLEXIBLE SIGMOIDOSCOPY N/A 10/08/2020    Procedure: SIGMOIDOSCOPY, FLEXIBLE;  Surgeon: Ziggy Hannah MD;  Location: [facility];  Service: General;  Laterality: N/A;    HYSTERECTOMY      INJECTION OF ANESTHETIC AGENT INTO TISSUE PLANE DEFINED BY TRANSVERSUS ABDOMINIS MUSCLE N/A 10/08/2020    Procedure: BLOCK, TRANSVERSUS ABDOMINIS PLANE;  Surgeon: Ziggy Hannah MD;  Location: [facility];  Service: General;  Laterality: N/A;    LAPAROSCOPIC LYSIS OF ADHESIONS  09/07/2022    Procedure: LYSIS, ADHESIONS, LAPAROSCOPIC;  Surgeon: Ziggy Hannah MD;  Location: [facility];   "Service: General;;    LAPAROSCOPIC SIGMOIDECTOMY N/A 10/08/2020    Procedure: COLECTOMY, SIGMOID, LAPAROSCOPIC;  Surgeon: Ziggy Hannah MD;  Location: [facility] OR;  Service: General;  Laterality: N/A;  lower anterior resection  Proctosigmoidectomy    LAPAROTOMY N/A 10/08/2020    Procedure: LAPAROTOMY;  Surgeon: Ziggy Hannah MD;  Location: [facility] OR;  Service: General;  Laterality: N/A;    OVARIAN CYST REMOVAL      WISDOM TOOTH EXTRACTION      XI ROBOTIC HYSTERECTOMY, WITH SALPINGO-OOPHORECTOMY Bilateral 09/07/2022    Procedure: XI ROBOTIC HYSTERECTOMY,WITH SALPINGO-OOPHORECTOMY;  Surgeon: Bela Noel MD;  Location: [facility] OR;  Service: OB/GYN;  Laterality: Bilateral;       Review of Systems       Objective:   Ht 5' 4" (1.626 m)   Wt 75.8 kg (167 lb)   LMP 08/25/2017   BMI 28.67 kg/m²     CT Chest Abdomen Pelvis With Contrast (xpd)  Narrative: EXAMINATION:  CT CHEST ABDOMEN PELVIS WITH CONTRAST (XPD)    CLINICAL HISTORY:  previous colorectal cancer, eval for recurrence or metastatic disease;Personal history of other malignant neoplasm of large intestine    TECHNIQUE:  The patient was surveyed from the thoracic inlet through the pelvis after the administration of 100 cc Omni 350 IV contrast as well as oral contrast and data was reconstructed for coronal, sagittal, and axial images.    COMPARISON:  08/09/2022, 01/19/2022    FINDINGS:  CHEST    No infiltrates or pleural effusions are identified.  There are a couple of stable subpleural less than 3 mm noncalcified nodule seen within either lung.  There are also a couple of tiny calcified granulomas seen within either lung.    The ascending aorta is mildly dilated but not considered aneurysmal and measures approximately 3.6 cm.  There is no pericardial effusion.  No enlarged mediastinal, hilar or axillary lymph nodes are identified.    ABDOMEN    Liver/gallbladder/biliary: The liver demonstrates no focal abnormality. The gallbladder is present and unremarkable. "  No biliary ductal dilation.    Pancreas: The pancreas is unremarkable in appearance.    Spleen: The spleen is not enlarged.    Adrenals: Unremarkable    Kidneys: The kidneys are equally perfused and demonstrate no solid masses.  Small probable cyst seen within the upper pole of the left kidney.  There is a nonobstructing upper pole left renal calculus that measures approximately 3 mm in size.    Bowel/Mesentery: There is no evidence of bowel obstruction.  Mild constipation within the rectosigmoid postsurgical changes seen in the region of the sigmoid colon with the linear intraluminal density noted in the region of postsurgical changes as well.  No mesenteric stranding or adenopathy.    Retroperitoneum: Shotty nonenlarged adenopathy.  The aorta demonstrates a normal caliber.    PELVIS    Genitourinary/Reproductive organs: Suspect post hysterectomy.    Adenopathy: None    Free Fluid: No free fluid    Osseus Structures/Soft tissues: No suspicious appearing osseus lesions. No significant soft tissue abnormality.  Small fat containing umbilical hernia.  Impression: No evidence of recurrent or metastatic disease.    All CT scans at this facility use dose modulation, iterative reconstruction and/or weight based dosing when appropriate to reduce radiation dose to as low as reasonably achievable.    Electronically signed by: Luis Angel Sharif MD  Date:    12/28/2022  Time:    15:18       Physical Exam    LOWER EXTREMITY PHYSICAL EXAMINATION    ORTHOPEDIC: There is moderate to severe tenderness to palpation along the course of the Achilles tendon on the left lower extremity.  There is no discomfort to palpation of the Achilles tendon upon its insertion onto the calcaneus at the superior border. Upon medial to lateral compression of the heel bone at the distal most insertion of the achilles tendon, there is moderate discomfort.  There is no fusiform edema noted along the course of the Achilles tendon. There are no defects noted  along the course of the Achilles tendon. The tenderness to palpation on the course of the Achilles is at approximately 1.5cm-3cm proximal to the achilles insertion on the calcaneus.  Also pain sub right 2nd metatarsal head.  No pain with range of motion of the metatarsophalangeal joint.  No evidence of plantar plate rupture.    DERMATOLOGY: There is no noted erythema or cellulitis noted. No ecchymosis is noted. Skin is supple, dry and intact. There is no palpable bursa noted at the achilles tendon insertion.  Skin is moist.  No ulcerations.  No calluses.     NEUROLOGY: Proprioception is intact, bilateral. Sensation to light touch is intact. Negative Tinel's Sign and negative Valleix sign. No neurological sensations with compression of the area of Green's Nerve in the area of the Abductor Hallucis muscle belly.    VASCULAR:  The right dorsalis pedis pulse 2/4 and the right posterior tibial pulse 2/4.  The left dorsalis pedis pulse 2/4 and posterior tibial pulse on the left is 2/4.  Capillary refill is intact.  Pedal hair growth intact      Assessment:     1. Left Achilles tendinitis    2. Metatarsalgia, right foot          Plan:     Left Achilles tendinitis    Metatarsalgia, right foot        Thorough discussion is had with the patient today concerning the diagnosis, its etiology, and the treatment algorithm at present.    Discussed pathology and etiology of achilles tendonitis.  Discussed importance of appropriate treatment options regarding stretching exercises, icing, resting, protective weight-bearing in Cam boot, heel lifts, and physical therapy    Discussed the importance of stretching to the posterior muscle groups of the gastrocnemius and the soleus.  A stretching sheet was provided to the patient in conjunction with a Thera-Band.  I do recommend patient perform stretching exercises 4-6 times per day and holding the stretches for approximately 15-30 seconds apiece.  We discussed importance of stretching as  relates to lengthening the posterior muscle group which can decrease drain on the posterior aspect of the heel as well as the plantar aspect of the heel.  This will also decrease pain associated with post static dyskinesia.  Teach back mechanism was performed with the patient demonstrating the stretching exercises.    Patient follow-up 2 weeks and consider physical therapy if no significant improvement with other modalities.            Future Appointments   Date Time Provider Department Center   7/7/2023  8:10 AM LABORATORY, [facility] LAB [place]   7/7/2023  9:50 AM [facility] [place]   7/11/2023 10:20 AM [facility]  MAMMO1 SCREEN [facility] MAMMO LA [facility]   7/11/2023 11:20 AM O'Oscar Mederos III, MD [facility] OBGYN [facility]   7/26/2023 10:00 AM EKG, [facility] SPECCPR [facility]   7/26/2023 10:20 AM Francisco Gannon MD [facility] CARDIO [facility]   10/16/2023 10:40 AM NAZIA HERNANDEZ CC LAB [facility] LAB DS [facility]   10/16/2023 11:00 AM Ziggy Hannah MD [facility] CLRCSR [facility]   6/20/2024  2:20 PM [facility]

## 2023-07-07 ENCOUNTER — LAB VISIT (OUTPATIENT)
Dept: LAB | Facility: HOSPITAL | Age: 58
End: 2023-07-07
Attending: INTERNAL MEDICINE
Payer: COMMERCIAL

## 2023-07-07 ENCOUNTER — TELEPHONE (OUTPATIENT)
Dept: CARDIOLOGY | Facility: CLINIC | Age: 58
End: 2023-07-07
Payer: COMMERCIAL

## 2023-07-07 DIAGNOSIS — I25.42 SPONTANEOUS DISSECTION OF CORONARY ARTERY: ICD-10-CM

## 2023-07-07 DIAGNOSIS — C18.9 COLON ADENOCARCINOMA: ICD-10-CM

## 2023-07-07 LAB
ALBUMIN SERPL BCP-MCNC: 4.1 G/DL (ref 3.5–5.2)
ALP SERPL-CCNC: 101 U/L (ref 55–135)
ALT SERPL W/O P-5'-P-CCNC: 49 U/L (ref 10–44)
ANION GAP SERPL CALC-SCNC: 8 MMOL/L (ref 8–16)
AST SERPL-CCNC: 29 U/L (ref 10–40)
BILIRUB SERPL-MCNC: 0.4 MG/DL (ref 0.1–1)
BUN SERPL-MCNC: 20 MG/DL (ref 6–20)
CALCIUM SERPL-MCNC: 9.8 MG/DL (ref 8.7–10.5)
CHLORIDE SERPL-SCNC: 104 MMOL/L (ref 95–110)
CHOLEST SERPL-MCNC: 208 MG/DL (ref 120–199)
CHOLEST/HDLC SERPL: 3.3 {RATIO} (ref 2–5)
CO2 SERPL-SCNC: 29 MMOL/L (ref 23–29)
CREAT SERPL-MCNC: 0.8 MG/DL (ref 0.5–1.4)
EST. GFR  (NO RACE VARIABLE): >60 ML/MIN/1.73 M^2
GLUCOSE SERPL-MCNC: 85 MG/DL (ref 70–110)
HDLC SERPL-MCNC: 63 MG/DL (ref 40–75)
HDLC SERPL: 30.3 % (ref 20–50)
LDLC SERPL CALC-MCNC: 127.4 MG/DL (ref 63–159)
NONHDLC SERPL-MCNC: 145 MG/DL
POTASSIUM SERPL-SCNC: 3.9 MMOL/L (ref 3.5–5.1)
PROT SERPL-MCNC: 7.4 G/DL (ref 6–8.4)
SODIUM SERPL-SCNC: 141 MMOL/L (ref 136–145)
TRIGL SERPL-MCNC: 88 MG/DL (ref 30–150)

## 2023-07-07 PROCEDURE — 36415 COLL VENOUS BLD VENIPUNCTURE: CPT | Mod: PO | Performed by: INTERNAL MEDICINE

## 2023-07-07 PROCEDURE — 82378 CARCINOEMBRYONIC ANTIGEN: CPT | Performed by: COLON & RECTAL SURGERY

## 2023-07-07 PROCEDURE — 80061 LIPID PANEL: CPT | Performed by: INTERNAL MEDICINE

## 2023-07-07 PROCEDURE — 80053 COMPREHEN METABOLIC PANEL: CPT | Performed by: INTERNAL MEDICINE

## 2023-07-07 NOTE — TELEPHONE ENCOUNTER
Please phone patient. Labs reviewed. Cholesterol too high.    Dr. Gannon to address at f/u appt.    Thanks    Patient was notified of results. All questions were answered. Pt verbalized understanding. Pt will call back with any other questions or concerns.

## 2023-07-08 LAB — CEA SERPL-MCNC: 1.8 NG/ML (ref 0–5)

## 2023-07-26 ENCOUNTER — OFFICE VISIT (OUTPATIENT)
Dept: CARDIOLOGY | Facility: CLINIC | Age: 58
End: 2023-07-26
Payer: COMMERCIAL

## 2023-07-26 ENCOUNTER — HOSPITAL ENCOUNTER (OUTPATIENT)
Dept: CARDIOLOGY | Facility: HOSPITAL | Age: 58
Discharge: HOME | End: 2023-07-26
Attending: INTERNAL MEDICINE
Payer: COMMERCIAL

## 2023-07-26 VITALS
SYSTOLIC BLOOD PRESSURE: 125 MMHG | BODY MASS INDEX: 25.79 KG/M2 | BODY MASS INDEX: 25.88 KG/M2 | OXYGEN SATURATION: 69 % | WEIGHT: 170.19 LBS | HEART RATE: 69 BPM | DIASTOLIC BLOOD PRESSURE: 81 MMHG | WEIGHT: 170.19 LBS | HEIGHT: 68 IN

## 2023-07-26 DIAGNOSIS — I21.02 ST ELEVATION MYOCARDIAL INFARCTION INVOLVING LEFT ANTERIOR DESCENDING (LAD) CORONARY ARTERY: ICD-10-CM

## 2023-07-26 DIAGNOSIS — C18.9 ADENOCARCINOMA OF COLON: ICD-10-CM

## 2023-07-26 DIAGNOSIS — Z12.11 ENCOUNTER FOR SCREENING COLONOSCOPY: ICD-10-CM

## 2023-07-26 DIAGNOSIS — Z88.8 ALLERGY TO STATIN MEDICATION: ICD-10-CM

## 2023-07-26 DIAGNOSIS — I77.3 FIBROMUSCULAR DYSPLASIA: ICD-10-CM

## 2023-07-26 DIAGNOSIS — R94.31 ABNORMAL EKG: ICD-10-CM

## 2023-07-26 DIAGNOSIS — I25.42 SPONTANEOUS DISSECTION OF CORONARY ARTERY: Primary | ICD-10-CM

## 2023-07-26 DIAGNOSIS — K52.832 LYMPHOCYTIC COLITIS: ICD-10-CM

## 2023-07-26 DIAGNOSIS — I25.42 SPONTANEOUS DISSECTION OF CORONARY ARTERY: ICD-10-CM

## 2023-07-26 DIAGNOSIS — R79.89 ELEVATED LFTS: ICD-10-CM

## 2023-07-26 PROCEDURE — 93010 ELECTROCARDIOGRAM REPORT: CPT | Mod: ,,, | Performed by: INTERNAL MEDICINE

## 2023-07-26 PROCEDURE — 3074F SYST BP LT 130 MM HG: CPT | Mod: CPTII,S$GLB,, | Performed by: INTERNAL MEDICINE

## 2023-07-26 PROCEDURE — 3008F PR BODY MASS INDEX (BMI) DOCUMENTED: ICD-10-PCS | Mod: CPTII,S$GLB,, | Performed by: INTERNAL MEDICINE

## 2023-07-26 PROCEDURE — 3044F PR MOST RECENT HEMOGLOBIN A1C LEVEL <7.0%: ICD-10-PCS | Mod: CPTII,S$GLB,, | Performed by: INTERNAL MEDICINE

## 2023-07-26 PROCEDURE — 3079F DIAST BP 80-89 MM HG: CPT | Mod: CPTII,S$GLB,, | Performed by: INTERNAL MEDICINE

## 2023-07-26 PROCEDURE — 99999 PR PBB SHADOW E&M-EST. PATIENT-LVL III: ICD-10-PCS | Mod: PBBFAC,,, | Performed by: INTERNAL MEDICINE

## 2023-07-26 PROCEDURE — 93010 EKG 12-LEAD: ICD-10-PCS | Mod: ,,, | Performed by: INTERNAL MEDICINE

## 2023-07-26 PROCEDURE — 3074F PR MOST RECENT SYSTOLIC BLOOD PRESSURE < 130 MM HG: ICD-10-PCS | Mod: CPTII,S$GLB,, | Performed by: INTERNAL MEDICINE

## 2023-07-26 PROCEDURE — 99999 PR PBB SHADOW E&M-EST. PATIENT-LVL III: CPT | Mod: PBBFAC,,, | Performed by: INTERNAL MEDICINE

## 2023-07-26 PROCEDURE — 99213 PR OFFICE/OUTPT VISIT, EST, LEVL III, 20-29 MIN: ICD-10-PCS | Mod: S$GLB,,, | Performed by: INTERNAL MEDICINE

## 2023-07-26 PROCEDURE — 4010F PR ACE/ARB THEARPY RXD/TAKEN: ICD-10-PCS | Mod: CPTII,S$GLB,, | Performed by: INTERNAL MEDICINE

## 2023-07-26 PROCEDURE — 93005 ELECTROCARDIOGRAM TRACING: CPT

## 2023-07-26 PROCEDURE — 3044F HG A1C LEVEL LT 7.0%: CPT | Mod: CPTII,S$GLB,, | Performed by: INTERNAL MEDICINE

## 2023-07-26 PROCEDURE — 4010F ACE/ARB THERAPY RXD/TAKEN: CPT | Mod: CPTII,S$GLB,, | Performed by: INTERNAL MEDICINE

## 2023-07-26 PROCEDURE — 3008F BODY MASS INDEX DOCD: CPT | Mod: CPTII,S$GLB,, | Performed by: INTERNAL MEDICINE

## 2023-07-26 PROCEDURE — 1159F MED LIST DOCD IN RCRD: CPT | Mod: CPTII,S$GLB,, | Performed by: INTERNAL MEDICINE

## 2023-07-26 PROCEDURE — 1159F PR MEDICATION LIST DOCUMENTED IN MEDICAL RECORD: ICD-10-PCS | Mod: CPTII,S$GLB,, | Performed by: INTERNAL MEDICINE

## 2023-07-26 PROCEDURE — 3079F PR MOST RECENT DIASTOLIC BLOOD PRESSURE 80-89 MM HG: ICD-10-PCS | Mod: CPTII,S$GLB,, | Performed by: INTERNAL MEDICINE

## 2023-07-26 PROCEDURE — 99213 OFFICE O/P EST LOW 20 MIN: CPT | Mod: S$GLB,,, | Performed by: INTERNAL MEDICINE

## 2023-07-26 RX ORDER — EVOLOCUMAB 140 MG/ML
140 INJECTION, SOLUTION SUBCUTANEOUS
Qty: 6 ML | Refills: 3 | Status: ACTIVE | OUTPATIENT
Start: 2023-07-26 | End: 2024-07-25

## 2023-07-26 NOTE — PROGRESS NOTES
Subjective:   Patient ID:  Jhoana Sierra is a 58 y.o. female who presents for follow up of No chief complaint on file.      HPI  2/14/2022 DR COOK  58 yo female, came in for post STEMI/ coronary dissection f/u  PMH h/o Colon cancer emoval parts in  no chemo and XRT  and in remission, fibromyalgia  01/24/2022 admitted to [facility] for STEMI. Emergent cath showed spontaneous mild to distal LAD dissection, normal EF and filling pressure. ascending TAA 4 cm.  Chest CTA showed no PE and TAA  Brain CT negative.   ABD CTA showed slight beaded appurtenance of shelbi l A. Typical for fibromuscular dysplasia  Vascularis workup negative for NIRANJAN CRP KHANG and ANCA.   D/c with ASA lipitor and metoprolol  Now no chest pain.      7/1/2022   HAS OCCASIONAL SHARP CHEST PAIN DIFFERENT THAN HER ORIGINAL EVENT  WITH CORONARY DISSECTION OF DISTAL LAD. SHE ASCENCIO SNO EXERTIONAL SYMPTOMS BACK TO YARD WORK AND BEING PHYSICALLY ACTIVE. HAS NO CHF SYMPTOMS HAS NO PALPITATION. SHE NEEDS SURGERY ON HER OVARIAN CYST. SHE IS HERE FOR CARDIAC CLEARANCE. NO H/O HTN OR ANY FAMILY H/O SCAT. HAD FIBROMUSCULAR DYSPLASIA ON RENAL ARTERIES.HAD INCREASED LFT NECESSITATING CESSATION OF STATIN THERAPY.      1/6/2023   Asymptomatic doing well clinically has not been exercising regularily tries to be compliant with diet takes meds regularily family stress. No angina .had hysterectomy in September no cardiac issues.  7/27/2023   Has been doing well has not had benefit from the cholest off she is intolerant to statins. She had stemi from spontaneous dissection she needs lipid therapy. I think [id number] is a good option for her. Asymptomatic cardiac wsie has not exercised due to foot problem. Has been very compliant with diet.   Past Medical History:   Diagnosis Date    Adenocarcinoma of colon     Colon cancer 2020    Heart attack 01/22/2022    PONV (postoperative nausea and vomiting)        Past Surgical History:   Procedure Laterality Date    COLONOSCOPY N/A  08/25/2020    Procedure: COLONOSCOPY;  Surgeon: Rianna Dillon MD;  Location: [facility];  Service: Endoscopy;  Laterality: N/A;    COLONOSCOPY N/A 01/07/2022    Procedure: COLONOSCOPY;  Surgeon: Ziggy Hannah MD;  Location: [facility];  Service: General;  Laterality: N/A;    DIAGNOSTIC LAPAROSCOPY N/A 09/07/2022    Procedure: LAPAROSCOPY, DIAGNOSTIC;  Surgeon: Ziggy Hannah MD;  Location: [facility] OR;  Service: General;  Laterality: N/A;    FLEXIBLE SIGMOIDOSCOPY N/A 10/08/2020    Procedure: SIGMOIDOSCOPY, FLEXIBLE;  Surgeon: Ziggy Hannah MD;  Location: [facility] OR;  Service: General;  Laterality: N/A;    HYSTERECTOMY      INJECTION OF ANESTHETIC AGENT INTO TISSUE PLANE DEFINED BY TRANSVERSUS ABDOMINIS MUSCLE N/A 10/08/2020    Procedure: BLOCK, TRANSVERSUS ABDOMINIS PLANE;  Surgeon: Ziggy Hannah MD;  Location: [facility];  Service: General;  Laterality: N/A;    LAPAROSCOPIC LYSIS OF ADHESIONS  09/07/2022    Procedure: LYSIS, ADHESIONS, LAPAROSCOPIC;  Surgeon: Ziggy Hannah MD;  Location: [facility];  Service: General;;    LAPAROSCOPIC SIGMOIDECTOMY N/A 10/08/2020    Procedure: COLECTOMY, SIGMOID, LAPAROSCOPIC;  Surgeon: Ziggy Hannah MD;  Location: [facility] OR;  Service: General;  Laterality: N/A;  lower anterior resection  Proctosigmoidectomy    LAPAROTOMY N/A 10/08/2020    Procedure: LAPAROTOMY;  Surgeon: Ziggy Hannah MD;  Location: [facility];  Service: General;  Laterality: N/A;    OVARIAN CYST REMOVAL      WISDOM TOOTH EXTRACTION      XI ROBOTIC HYSTERECTOMY, WITH SALPINGO-OOPHORECTOMY Bilateral 09/07/2022    Procedure: XI ROBOTIC HYSTERECTOMY,WITH SALPINGO-OOPHORECTOMY;  Surgeon: Bela Noel MD;  Location: [facility];  Service: OB/GYN;  Laterality: Bilateral;       Social History     Tobacco Use    Smoking status: Never    Smokeless tobacco: Never   Substance Use Topics    Alcohol use: Yes     Comment: Occ use; HOLD 72HRS PRIOR TO SURGERY    Drug use: No       Family History   Problem  Relation Age of Onset    Fibromyalgia Sister     Diabetes Neg Hx     Heart disease Neg Hx        Current Outpatient Medications   Medication Sig    aspirin 81 mg Cap 81 MG  .    metoprolol succinate (TOPROL-XL) 25 MG 24 hr tablet Take 1 tablet by mouth once daily    phytosterol-pantethine 300-100 mg Cap Take 2 tablets by mouth 2 (two) times a day.    tiZANidine 4 mg Cap Take 1 capsule by mouth 3 (three) times daily as needed.     No current facility-administered medications for this visit.     Current Outpatient Medications on File Prior to Visit   Medication Sig    aspirin 81 mg Cap 81 MG  .    metoprolol succinate (TOPROL-XL) 25 MG 24 hr tablet Take 1 tablet by mouth once daily    phytosterol-pantethine 300-100 mg Cap Take 2 tablets by mouth 2 (two) times a day.    tiZANidine 4 mg Cap Take 1 capsule by mouth 3 (three) times daily as needed.     No current facility-administered medications on file prior to visit.     Review of patient's allergies indicates:  No Known Allergies   Review of Systems   Constitutional: Negative for diaphoresis, malaise/fatigue and weight gain.   HENT:  Negative for hoarse voice.    Eyes:  Negative for double vision and visual disturbance.   Cardiovascular:  Negative for chest pain, claudication, cyanosis, dyspnea on exertion, irregular heartbeat, leg swelling, near-syncope, orthopnea, palpitations, paroxysmal nocturnal dyspnea and syncope.   Respiratory:  Negative for cough, hemoptysis, shortness of breath and snoring.    Hematologic/Lymphatic: Negative for bleeding problem. Does not bruise/bleed easily.   Skin:  Negative for color change and poor wound healing.   Musculoskeletal:  Negative for muscle cramps, muscle weakness and myalgias.   Gastrointestinal:  Negative for bloating, abdominal pain, change in bowel habit, diarrhea, heartburn, hematemesis, hematochezia, melena and nausea.   Neurological:  Negative for excessive daytime sleepiness, dizziness, headaches, light-headedness,  loss of balance, numbness and weakness.   Psychiatric/Behavioral:  Negative for memory loss. The patient does not have insomnia.    Allergic/Immunologic: Negative for hives.     Objective:   Physical Exam  Constitutional:       General: She is not in acute distress.     Appearance: Normal appearance. She is well-developed. She is not ill-appearing.   HENT:      Head: Normocephalic and atraumatic.   Eyes:      General: No scleral icterus.     Pupils: Pupils are equal, round, and reactive to light.   Neck:      Thyroid: No thyromegaly.      Vascular: Normal carotid pulses. No carotid bruit, hepatojugular reflux or JVD.      Trachea: No tracheal deviation.   Cardiovascular:      Rate and Rhythm: Normal rate and regular rhythm.      Pulses: Normal pulses.      Heart sounds: Normal heart sounds. No murmur heard.    No friction rub. No gallop.   Pulmonary:      Effort: Pulmonary effort is normal. No respiratory distress.      Breath sounds: Normal breath sounds. No wheezing, rhonchi or rales.   Chest:      Chest wall: No tenderness.   Abdominal:      General: Bowel sounds are normal. There is no abdominal bruit.      Palpations: Abdomen is soft. There is no hepatomegaly or pulsatile mass.      Tenderness: There is no abdominal tenderness.   Musculoskeletal:      Right shoulder: No deformity.      Cervical back: Normal range of motion and neck supple.   Skin:     General: Skin is warm and dry.      Findings: No erythema or rash.      Nails: There is no clubbing.   Neurological:      Mental Status: She is alert and oriented to person, place, and time.      Cranial Nerves: No cranial nerve deficit.      Coordination: Coordination normal.   Psychiatric:         Speech: Speech normal.         Behavior: Behavior normal.     Vitals:    07/26/23 1012 07/26/23 1018   BP: 134/80 125/81   BP Location: Left arm Right arm   Patient Position: Sitting Sitting   BP Method: Large (Automatic) Large (Automatic)   Pulse: 69    SpO2: (!) 69%   "  Weight: 77.2 kg (170 lb 3.1 oz)    Height: 5' 8" (1.727 m)      Lab Results   Component Value Date    CHOL 208 (H) 07/07/2023    CHOL 212 (H) 12/06/2022    CHOL 158 06/02/2022      Body mass index is 25.88 kg/m².   Lab Results   Component Value Date    HGBA1C 5.4 07/07/2023      BMP  Lab Results   Component Value Date     07/07/2023    K 3.9 07/07/2023     07/07/2023    CO2 29 07/07/2023    BUN 20 07/07/2023    CREATININE 0.8 07/07/2023    CALCIUM 9.8 07/07/2023    ANIONGAP 8 07/07/2023    EGFRNORACEVR >60.0 07/07/2023      Lab Results   Component Value Date    HDL 63 07/07/2023    HDL 68 12/06/2022    HDL 70 06/02/2022     Lab Results   Component Value Date    LDLCALC 127.4 07/07/2023    LDLCALC 125.4 12/06/2022    LDLCALC 75.2 06/02/2022     Lab Results   Component Value Date    TRIG 88 07/07/2023    TRIG 93 12/06/2022    TRIG 64 06/02/2022     Lab Results   Component Value Date    CHOLHDL 30.3 07/07/2023    CHOLHDL 32.1 12/06/2022    CHOLHDL 44.3 06/02/2022       Chemistry        Component Value Date/Time     07/07/2023 0838    K 3.9 07/07/2023 0838     07/07/2023 0838    CO2 29 07/07/2023 0838    BUN 20 07/07/2023 0838    CREATININE 0.8 07/07/2023 0838    GLU 85 07/07/2023 0838        Component Value Date/Time    CALCIUM 9.8 07/07/2023 0838    ALKPHOS 101 07/07/2023 0838    AST 29 07/07/2023 0838    ALT 49 (H) 07/07/2023 0838    BILITOT 0.4 07/07/2023 0838    ESTGFRAFRICA >60.0 07/25/2022 1037    EGFRNONAA >60.0 07/25/2022 1037          Lab Results   Component Value Date    TSH 3.352 07/07/2023     No results found for: INR, PROTIME  Lab Results   Component Value Date    WBC 3.79 (L) 07/07/2023    HGB 13.3 07/07/2023    HCT 40.3 07/07/2023    MCV 94 07/07/2023     07/07/2023     BMP  Sodium   Date Value Ref Range Status   07/07/2023 141 136 - 145 mmol/L Final     Potassium   Date Value Ref Range Status   07/07/2023 3.9 3.5 - 5.1 mmol/L Final     Chloride   Date Value Ref Range " Status   07/07/2023 104 95 - 110 mmol/L Final     CO2   Date Value Ref Range Status   07/07/2023 29 23 - 29 mmol/L Final     BUN   Date Value Ref Range Status   07/07/2023 20 6 - 20 mg/dL Final     Creatinine   Date Value Ref Range Status   07/07/2023 0.8 0.5 - 1.4 mg/dL Final     Calcium   Date Value Ref Range Status   07/07/2023 9.8 8.7 - 10.5 mg/dL Final     Anion Gap   Date Value Ref Range Status   07/07/2023 8 8 - 16 mmol/L Final     eGFR if    Date Value Ref Range Status   07/25/2022 >60.0 >60 mL/min/1.73 m^2 Final     eGFR if non    Date Value Ref Range Status   07/25/2022 >60.0 >60 mL/min/1.73 m^2 Final     Comment:     Calculation used to obtain the estimated glomerular filtration  rate (eGFR) is the CKD-EPI equation.        CrCl cannot be calculated (Patient's most recent lab result is older than the maximum 7 days allowed.).    Assessment:     1. Spontaneous dissection of coronary artery    2. Abnormal EKG    3. Fibromuscular dysplasia    4. ST elevation myocardial infarction involving left anterior descending (LAD) coronary artery    5. Adenocarcinoma of colon    6. Lymphocytic colitis    7. Encounter for screening colonoscopy    8. Elevated LFTs    9. Allergy to statin medication    HTN RESOLVED OFF MEDS LOW SALT DIET EMPHASIZED   SPONTANEOUS CORONARY DISSECTION STEMI HLP INTOLERANT TO STATINS DIOD NOT GET RESULTS FROM DIET COMPLIANCE CHOLEST OFF I THINK SHE WILL BENEFIT FROM REPATHA PRESCRIPTION TO [facility]   ENCOYURAGED WATER AEROBICS FOR EXERCISE AND WEIGHT LOSS  Plan:     [id number] repataha to [facility]  Continue current therapy  Cardiac low salt diet.  Risk factor modification and excercise program.  F/u in 6 months with lipid cmp

## 2023-07-27 ENCOUNTER — TELEPHONE (OUTPATIENT)
Dept: PHARMACY | Facility: CLINIC | Age: 58
End: 2023-07-27
Payer: COMMERCIAL

## 2023-07-27 NOTE — TELEPHONE ENCOUNTER
Hello, this is Aline Tijerina, clinical pharmacist with [facility] that is part of your care team.  We have begun working on your prescription that your doctor has sent us. Our next steps include:     Working with your insurance company to obtain approval for your medication  Working with you to ensure your medication is affordable     We will be calling you along the way with updates on your medication but if you have any concerns or receive information that you would like to discuss please reach us at [phone].    Welcome call outcome: No answer/Unable to leave voicemail    PA submitted/approved through 01/26/24. Pt locked into [facility]. Pt medications all filled at [facility]. Transferring rx to [facility]. Outgoing call to inform pt, no VM.

## 2023-10-14 ENCOUNTER — PATIENT MESSAGE (OUTPATIENT)
Dept: SURGERY | Facility: CLINIC | Age: 58
End: 2023-10-14
Payer: COMMERCIAL

## 2023-10-19 ENCOUNTER — LAB VISIT (OUTPATIENT)
Dept: LAB | Facility: HOSPITAL | Age: 58
End: 2023-10-19
Attending: COLON & RECTAL SURGERY
Payer: COMMERCIAL

## 2023-10-19 DIAGNOSIS — C18.9 COLON ADENOCARCINOMA: ICD-10-CM

## 2023-10-19 PROCEDURE — 36415 COLL VENOUS BLD VENIPUNCTURE: CPT | Mod: PO | Performed by: COLON & RECTAL SURGERY

## 2023-10-19 PROCEDURE — 82378 CARCINOEMBRYONIC ANTIGEN: CPT | Performed by: COLON & RECTAL SURGERY

## 2023-10-20 LAB — CEA SERPL-MCNC: 1.9 NG/ML (ref 0–5)

## 2023-10-30 ENCOUNTER — OFFICE VISIT (OUTPATIENT)
Dept: SURGERY | Facility: CLINIC | Age: 58
End: 2023-10-30
Payer: COMMERCIAL

## 2023-10-30 VITALS
SYSTOLIC BLOOD PRESSURE: 131 MMHG | BODY MASS INDEX: 25.88 KG/M2 | HEART RATE: 93 BPM | DIASTOLIC BLOOD PRESSURE: 82 MMHG | WEIGHT: 170.19 LBS

## 2023-10-30 DIAGNOSIS — Z85.038 PERSONAL HISTORY OF COLON CANCER: Primary | ICD-10-CM

## 2023-10-30 DIAGNOSIS — C18.9 COLON ADENOCARCINOMA: ICD-10-CM

## 2023-10-30 PROCEDURE — 3075F PR MOST RECENT SYSTOLIC BLOOD PRESS GE 130-139MM HG: ICD-10-PCS | Mod: CPTII,S$GLB,, | Performed by: COLON & RECTAL SURGERY

## 2023-10-30 PROCEDURE — 3079F DIAST BP 80-89 MM HG: CPT | Mod: CPTII,S$GLB,, | Performed by: COLON & RECTAL SURGERY

## 2023-10-30 PROCEDURE — 3079F PR MOST RECENT DIASTOLIC BLOOD PRESSURE 80-89 MM HG: ICD-10-PCS | Mod: CPTII,S$GLB,, | Performed by: COLON & RECTAL SURGERY

## 2023-10-30 PROCEDURE — 1159F PR MEDICATION LIST DOCUMENTED IN MEDICAL RECORD: ICD-10-PCS | Mod: CPTII,S$GLB,, | Performed by: COLON & RECTAL SURGERY

## 2023-10-30 PROCEDURE — 3044F PR MOST RECENT HEMOGLOBIN A1C LEVEL <7.0%: ICD-10-PCS | Mod: CPTII,S$GLB,, | Performed by: COLON & RECTAL SURGERY

## 2023-10-30 PROCEDURE — 99999 PR PBB SHADOW E&M-EST. PATIENT-LVL III: ICD-10-PCS | Mod: PBBFAC,,, | Performed by: COLON & RECTAL SURGERY

## 2023-10-30 PROCEDURE — 3008F BODY MASS INDEX DOCD: CPT | Mod: CPTII,S$GLB,, | Performed by: COLON & RECTAL SURGERY

## 2023-10-30 PROCEDURE — 3075F SYST BP GE 130 - 139MM HG: CPT | Mod: CPTII,S$GLB,, | Performed by: COLON & RECTAL SURGERY

## 2023-10-30 PROCEDURE — 99999 PR PBB SHADOW E&M-EST. PATIENT-LVL III: CPT | Mod: PBBFAC,,, | Performed by: COLON & RECTAL SURGERY

## 2023-10-30 PROCEDURE — 99214 OFFICE O/P EST MOD 30 MIN: CPT | Mod: S$GLB,,, | Performed by: COLON & RECTAL SURGERY

## 2023-10-30 PROCEDURE — 3008F PR BODY MASS INDEX (BMI) DOCUMENTED: ICD-10-PCS | Mod: CPTII,S$GLB,, | Performed by: COLON & RECTAL SURGERY

## 2023-10-30 PROCEDURE — 1159F MED LIST DOCD IN RCRD: CPT | Mod: CPTII,S$GLB,, | Performed by: COLON & RECTAL SURGERY

## 2023-10-30 PROCEDURE — 3044F HG A1C LEVEL LT 7.0%: CPT | Mod: CPTII,S$GLB,, | Performed by: COLON & RECTAL SURGERY

## 2023-10-30 PROCEDURE — 99214 PR OFFICE/OUTPT VISIT, EST, LEVL IV, 30-39 MIN: ICD-10-PCS | Mod: S$GLB,,, | Performed by: COLON & RECTAL SURGERY

## 2023-10-30 PROCEDURE — 4010F ACE/ARB THERAPY RXD/TAKEN: CPT | Mod: CPTII,S$GLB,, | Performed by: COLON & RECTAL SURGERY

## 2023-10-30 PROCEDURE — 4010F PR ACE/ARB THEARPY RXD/TAKEN: ICD-10-PCS | Mod: CPTII,S$GLB,, | Performed by: COLON & RECTAL SURGERY

## 2023-10-30 NOTE — PROGRESS NOTES
History & Physical    SUBJECTIVE:     CC: rectosigmoid adenocarcinoma  Ref MD: Rianna Dillon MD    History of Present Illness:  Patient is a 58 y.o. 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  Patient had a positive fit test in June 2020 which prompted the scheduling of a colonoscopy.  This colonoscopy was performed in August 2020 and showed a mass in the rectosigmoid region with biopsies resulting in adenocarcinoma.  Patient states that she had seen intermittent blood in her stool for 3 months prior to this.  This was her 1st colonoscopy ever.  She denies any associated fever, chills, nausea, vomiting, diarrhea, constipation, weight loss or night sweats.  She has no known personal family history of colorectal cancer or colonic polyps previously.  Her only significant abdominal surgical history is an open ovarian cyst removal via Pfannenstiel incision.    10/8/2020: Lap LAR (Stage 1: T2N0 adenoCA)    Interval history:  Since last clinic visit, the patient continues to do well. She is tolerating regular diet and having normal bowel function. She denies any fever, chills, nausea, vomiting, hematochezia or melena.    Review of patient's allergies indicates:  No Known Allergies    Current Outpatient Medications   Medication Sig Dispense Refill    aspirin 81 mg Cap 81 MG  .      evolocumab (REPATHA SURECLICK) 140 mg/mL PnIj Inject 1 mL (140 mg total) into the skin every 14 (fourteen) days. 6 mL 3    metoprolol succinate (TOPROL-XL) 25 MG 24 hr tablet Take 1 tablet by mouth once daily 90 tablet 3    tiZANidine 4 mg Cap Take 1 capsule by mouth 3 (three) times daily as needed. 30 capsule 0    phytosterol-pantethine 300-100 mg Cap Take 2 tablets by mouth 2 (two) times a day.       No current facility-administered medications for this visit.       Past Medical History:   Diagnosis Date    Adenocarcinoma of colon     Colon cancer 2020    Heart attack 01/22/2022    PONV (postoperative nausea and vomiting)       Past Surgical History:   Procedure Laterality Date    COLONOSCOPY N/A 08/25/2020    Procedure: COLONOSCOPY;  Surgeon: Rianna Dillon MD;  Location: [facility];  Service: Endoscopy;  Laterality: N/A;    COLONOSCOPY N/A 01/07/2022    Procedure: COLONOSCOPY;  Surgeon: Ziggy Hannah MD;  Location: [facility] ENDO;  Service: General;  Laterality: N/A;    DIAGNOSTIC LAPAROSCOPY N/A 09/07/2022    Procedure: LAPAROSCOPY, DIAGNOSTIC;  Surgeon: Ziggy Hannah MD;  Location: [facility] OR;  Service: General;  Laterality: N/A;    FLEXIBLE SIGMOIDOSCOPY N/A 10/08/2020    Procedure: SIGMOIDOSCOPY, FLEXIBLE;  Surgeon: Ziggy Hannah MD;  Location: [facility] OR;  Service: General;  Laterality: N/A;    HYSTERECTOMY      INJECTION OF ANESTHETIC AGENT INTO TISSUE PLANE DEFINED BY TRANSVERSUS ABDOMINIS MUSCLE N/A 10/08/2020    Procedure: BLOCK, TRANSVERSUS ABDOMINIS PLANE;  Surgeon: Ziggy Hannah MD;  Location: [facility];  Service: General;  Laterality: N/A;    LAPAROSCOPIC LYSIS OF ADHESIONS  09/07/2022    Procedure: LYSIS, ADHESIONS, LAPAROSCOPIC;  Surgeon: Ziggy Hannah MD;  Location: [facility] OR;  Service: General;;    LAPAROSCOPIC SIGMOIDECTOMY N/A 10/08/2020    Procedure: COLECTOMY, SIGMOID, LAPAROSCOPIC;  Surgeon: Ziggy Hannah MD;  Location: [facility] OR;  Service: General;  Laterality: N/A;  lower anterior resection  Proctosigmoidectomy    LAPAROTOMY N/A 10/08/2020    Procedure: LAPAROTOMY;  Surgeon: Ziggy Hannah MD;  Location: [facility] OR;  Service: General;  Laterality: N/A;    OVARIAN CYST REMOVAL      WISDOM TOOTH EXTRACTION      XI ROBOTIC HYSTERECTOMY, WITH SALPINGO-OOPHORECTOMY Bilateral 09/07/2022    Procedure: XI ROBOTIC HYSTERECTOMY,WITH SALPINGO-OOPHORECTOMY;  Surgeon: Bela Noel MD;  Location: [facility];  Service: OB/GYN;  Laterality: Bilateral;     Family History   Problem Relation Age of Onset    Fibromyalgia Sister     Diabetes Neg Hx     Heart disease Neg Hx      Social History     Tobacco Use     Smoking status: Never    Smokeless tobacco: Never   Substance Use Topics    Alcohol use: Yes     Comment: Occ use; HOLD 72HRS PRIOR TO SURGERY    Drug use: No        Review of Systems:  Review of Systems   Constitutional:  Negative for activity change, appetite change, chills, fatigue, fever and unexpected weight change.   HENT:  Negative for congestion, ear pain, sore throat and trouble swallowing.    Eyes:  Negative for pain, redness and itching.   Respiratory:  Negative for cough, shortness of breath and wheezing.    Cardiovascular:  Negative for chest pain, palpitations and leg swelling.   Gastrointestinal:  Negative for abdominal distention, abdominal pain, blood in stool, constipation, diarrhea, nausea, rectal pain and vomiting.   Endocrine: Negative for cold intolerance, heat intolerance and polyuria.   Genitourinary:  Negative for dysuria, flank pain, frequency and hematuria.   Musculoskeletal:  Negative for gait problem, joint swelling and neck pain.   Skin:  Negative for color change, rash and wound.   Allergic/Immunologic: Negative for environmental allergies and immunocompromised state.   Neurological:  Negative for dizziness, speech difficulty, weakness and numbness.   Psychiatric/Behavioral:  Negative for agitation, confusion and hallucinations.        OBJECTIVE:     Vital Signs (Most Recent)  Pulse: 93 (10/30/23 1025)  BP: 131/82 (10/30/23 1025)     77.2 kg (170 lb 3.1 oz)     Physical Exam:  Physical Exam  Vitals reviewed.   Constitutional:       Appearance: She is well-developed.   HENT:      Head: Normocephalic and atraumatic.   Eyes:      Conjunctiva/sclera: Conjunctivae normal.   Neck:      Thyroid: No thyromegaly.   Cardiovascular:      Rate and Rhythm: Normal rate.   Pulmonary:      Effort: Pulmonary effort is normal. No respiratory distress.   Abdominal:      General: There is no distension.      Palpations: Abdomen is soft. There is no mass.      Tenderness: There is no abdominal tenderness.       Comments: Well-healed incisions without SOI   Musculoskeletal:         General: No tenderness. Normal range of motion.      Cervical back: Normal range of motion.   Skin:     General: Skin is warm and dry.      Capillary Refill: Capillary refill takes less than 2 seconds.      Findings: No rash.   Neurological:      Mental Status: She is alert and oriented to person, place, and time.         Laboratory  Lab Results   Component Value Date    WBC 3.79 (L) 07/07/2023    HGB 13.3 07/07/2023    HCT 40.3 07/07/2023     07/07/2023    CHOL 208 (H) 07/07/2023    TRIG 88 07/07/2023    HDL 63 07/07/2023    ALT 49 (H) 07/07/2023    AST 29 07/07/2023     07/07/2023    K 3.9 07/07/2023     07/07/2023    CREATININE 0.8 07/07/2023    BUN 20 07/07/2023    CO2 29 07/07/2023    TSH 3.352 07/07/2023    HGBA1C 5.4 07/07/2023     Component Ref Range & Units 11 d ago  (10/19/23) 3 mo ago  (7/7/23) 8 mo ago  (2/8/23) 1 yr ago  (10/17/22) 1 yr ago  (7/25/22) 1 yr ago  (4/11/22) 2 yr ago  (10/11/21)   CEA 0.0 - 5.0 ng/mL 1.9  1.8 CM  2.0 CM  2.0 CM  <1.7 CM  1.7 CM  2.1 CM           Diagnostic Results:  Colonoscopy 01/2022: Reviewed    CT Dec 2022:  FINDINGS:  CHEST     No infiltrates or pleural effusions are identified.  There are a couple of stable subpleural less than 3 mm noncalcified nodule seen within either lung.  There are also a couple of tiny calcified granulomas seen within either lung.     The ascending aorta is mildly dilated but not considered aneurysmal and measures approximately 3.6 cm.  There is no pericardial effusion.  No enlarged mediastinal, hilar or axillary lymph nodes are identified.     ABDOMEN     Liver/gallbladder/biliary: The liver demonstrates no focal abnormality. The gallbladder is present and unremarkable.  No biliary ductal dilation.     Pancreas: The pancreas is unremarkable in appearance.     Spleen: The spleen is not enlarged.     Adrenals: Unremarkable     Kidneys: The kidneys are  equally perfused and demonstrate no solid masses.  Small probable cyst seen within the upper pole of the left kidney.  There is a nonobstructing upper pole left renal calculus that measures approximately 3 mm in size.     Bowel/Mesentery: There is no evidence of bowel obstruction.  Mild constipation within the rectosigmoid postsurgical changes seen in the region of the sigmoid colon with the linear intraluminal density noted in the region of postsurgical changes as well.  No mesenteric stranding or adenopathy.     Retroperitoneum: Shotty nonenlarged adenopathy.  The aorta demonstrates a normal caliber.     PELVIS     Genitourinary/Reproductive organs: Suspect post hysterectomy.     Adenopathy: None     Free Fluid: No free fluid     Osseus Structures/Soft tissues: No suspicious appearing osseus lesions. No significant soft tissue abnormality.  Small fat containing umbilical hernia.     Impression:     No evidence of recurrent or metastatic disease.    ASSESSMENT/PLAN:     58 y.o. 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

## 2023-10-31 ENCOUNTER — PATIENT MESSAGE (OUTPATIENT)
Dept: SURGERY | Facility: CLINIC | Age: 58
End: 2023-10-31
Payer: COMMERCIAL

## 2023-11-15 RX ORDER — TIZANIDINE HYDROCHLORIDE 4 MG/1
1 CAPSULE, GELATIN COATED ORAL
Qty: 30 CAPSULE | Refills: 0 | Status: SHIPPED | OUTPATIENT
Start: 2023-11-15

## 2023-11-15 NOTE — TELEPHONE ENCOUNTER
No care due was identified.  [facility] Embedded Care Due Messages. Reference number: [id number].   11/15/2023 8:52:33 AM CST

## 2023-12-27 ENCOUNTER — HOSPITAL ENCOUNTER (OUTPATIENT)
Dept: RADIOLOGY | Facility: HOSPITAL | Age: 58
Discharge: HOME | End: 2023-12-27
Attending: COLON & RECTAL SURGERY
Payer: COMMERCIAL

## 2023-12-27 DIAGNOSIS — Z85.038 PERSONAL HISTORY OF COLON CANCER: ICD-10-CM

## 2023-12-27 PROCEDURE — 74177 CT ABD & PELVIS W/CONTRAST: CPT | Mod: 26,,, | Performed by: RADIOLOGY

## 2023-12-27 PROCEDURE — 71260 CT CHEST ABDOMEN PELVIS WITH IV CONTRAST (XPD): ICD-10-PCS | Mod: 26,,, | Performed by: RADIOLOGY

## 2023-12-27 PROCEDURE — 71260 CT THORAX DX C+: CPT | Mod: TC

## 2023-12-27 PROCEDURE — 25500020 PHARM REV CODE 255: Performed by: COLON & RECTAL SURGERY

## 2023-12-27 PROCEDURE — 74177 CT ABD & PELVIS W/CONTRAST: CPT | Mod: TC

## 2023-12-27 PROCEDURE — A9698 NON-RAD CONTRAST MATERIALNOC: HCPCS | Performed by: COLON & RECTAL SURGERY

## 2023-12-27 PROCEDURE — 74177 CT CHEST ABDOMEN PELVIS WITH IV CONTRAST (XPD): ICD-10-PCS | Mod: 26,,, | Performed by: RADIOLOGY

## 2023-12-27 PROCEDURE — 71260 CT THORAX DX C+: CPT | Mod: 26,,, | Performed by: RADIOLOGY

## 2023-12-27 RX ADMIN — IOHEXOL 75 ML: 350 INJECTION, SOLUTION INTRAVENOUS at 09:12

## 2023-12-27 RX ADMIN — IOHEXOL 1000 ML: 12 SOLUTION ORAL at 08:12

## 2024-01-12 ENCOUNTER — TELEPHONE (OUTPATIENT)
Dept: CARDIOLOGY | Facility: CLINIC | Age: 59
End: 2024-01-12
Payer: COMMERCIAL

## 2024-01-18 ENCOUNTER — LAB VISIT (OUTPATIENT)
Dept: LAB | Facility: HOSPITAL | Age: 59
End: 2024-01-18
Attending: INTERNAL MEDICINE
Payer: COMMERCIAL

## 2024-01-18 DIAGNOSIS — Z85.038 PERSONAL HISTORY OF COLON CANCER: ICD-10-CM

## 2024-01-18 DIAGNOSIS — I21.02 ST ELEVATION MYOCARDIAL INFARCTION INVOLVING LEFT ANTERIOR DESCENDING (LAD) CORONARY ARTERY: ICD-10-CM

## 2024-01-18 DIAGNOSIS — Z88.8 ALLERGY TO STATIN MEDICATION: ICD-10-CM

## 2024-01-18 DIAGNOSIS — I25.42 SPONTANEOUS DISSECTION OF CORONARY ARTERY: ICD-10-CM

## 2024-01-18 LAB
ALBUMIN SERPL BCP-MCNC: 4.2 G/DL (ref 3.5–5.2)
ALP SERPL-CCNC: 100 U/L (ref 55–135)
ALT SERPL W/O P-5'-P-CCNC: 33 U/L (ref 10–44)
ANION GAP SERPL CALC-SCNC: 11 MMOL/L (ref 8–16)
AST SERPL-CCNC: 22 U/L (ref 10–40)
BILIRUB SERPL-MCNC: 0.5 MG/DL (ref 0.1–1)
BUN SERPL-MCNC: 19 MG/DL (ref 6–20)
CALCIUM SERPL-MCNC: 9.9 MG/DL (ref 8.7–10.5)
CEA SERPL-MCNC: 1.9 NG/ML (ref 0–5)
CHLORIDE SERPL-SCNC: 102 MMOL/L (ref 95–110)
CHOLEST SERPL-MCNC: 150 MG/DL (ref 120–199)
CHOLEST/HDLC SERPL: 2.1 {RATIO} (ref 2–5)
CO2 SERPL-SCNC: 26 MMOL/L (ref 23–29)
CREAT SERPL-MCNC: 0.8 MG/DL (ref 0.5–1.4)
EST. GFR  (NO RACE VARIABLE): >60 ML/MIN/1.73 M^2
GLUCOSE SERPL-MCNC: 91 MG/DL (ref 70–110)
HDLC SERPL-MCNC: 70 MG/DL (ref 40–75)
HDLC SERPL: 46.7 % (ref 20–50)
LDLC SERPL CALC-MCNC: 61 MG/DL (ref 63–159)
NONHDLC SERPL-MCNC: 80 MG/DL
POTASSIUM SERPL-SCNC: 4.5 MMOL/L (ref 3.5–5.1)
PROT SERPL-MCNC: 7.6 G/DL (ref 6–8.4)
SODIUM SERPL-SCNC: 139 MMOL/L (ref 136–145)
TRIGL SERPL-MCNC: 95 MG/DL (ref 30–150)

## 2024-01-18 PROCEDURE — 80061 LIPID PANEL: CPT | Performed by: INTERNAL MEDICINE

## 2024-01-18 PROCEDURE — 36415 COLL VENOUS BLD VENIPUNCTURE: CPT | Mod: PO | Performed by: INTERNAL MEDICINE

## 2024-01-18 PROCEDURE — 82378 CARCINOEMBRYONIC ANTIGEN: CPT | Performed by: COLON & RECTAL SURGERY

## 2024-01-18 PROCEDURE — 80053 COMPREHEN METABOLIC PANEL: CPT | Performed by: INTERNAL MEDICINE

## 2024-02-13 DIAGNOSIS — I25.42 SPONTANEOUS DISSECTION OF CORONARY ARTERY: Primary | ICD-10-CM

## 2024-02-13 DIAGNOSIS — R94.31 ABNORMAL EKG: ICD-10-CM

## 2024-02-14 ENCOUNTER — HOSPITAL ENCOUNTER (OUTPATIENT)
Dept: CARDIOLOGY | Facility: HOSPITAL | Age: 59
Discharge: HOME | End: 2024-02-14
Attending: INTERNAL MEDICINE
Payer: COMMERCIAL

## 2024-02-14 ENCOUNTER — OFFICE VISIT (OUTPATIENT)
Dept: CARDIOLOGY | Facility: CLINIC | Age: 59
End: 2024-02-14
Payer: COMMERCIAL

## 2024-02-14 VITALS
DIASTOLIC BLOOD PRESSURE: 75 MMHG | WEIGHT: 178.56 LBS | BODY MASS INDEX: 27.06 KG/M2 | HEIGHT: 68 IN | OXYGEN SATURATION: 96 % | WEIGHT: 178.56 LBS | BODY MASS INDEX: 27.15 KG/M2 | SYSTOLIC BLOOD PRESSURE: 143 MMHG | HEART RATE: 77 BPM

## 2024-02-14 DIAGNOSIS — I25.42 SPONTANEOUS DISSECTION OF CORONARY ARTERY: ICD-10-CM

## 2024-02-14 DIAGNOSIS — R94.31 ABNORMAL EKG: ICD-10-CM

## 2024-02-14 DIAGNOSIS — K52.832 LYMPHOCYTIC COLITIS: ICD-10-CM

## 2024-02-14 DIAGNOSIS — C18.9 ADENOCARCINOMA OF COLON: ICD-10-CM

## 2024-02-14 DIAGNOSIS — I77.3 FIBROMUSCULAR DYSPLASIA: ICD-10-CM

## 2024-02-14 DIAGNOSIS — R19.5 POSITIVE FIT (FECAL IMMUNOCHEMICAL TEST): ICD-10-CM

## 2024-02-14 DIAGNOSIS — R79.89 ELEVATED LFTS: ICD-10-CM

## 2024-02-14 DIAGNOSIS — Z88.8 ALLERGY TO STATIN MEDICATION: Primary | ICD-10-CM

## 2024-02-14 LAB
OHS QRS DURATION: 78 MS
OHS QTC CALCULATION: 405 MS

## 2024-02-14 PROCEDURE — 1160F RVW MEDS BY RX/DR IN RCRD: CPT | Mod: CPTII,S$GLB,, | Performed by: INTERNAL MEDICINE

## 2024-02-14 PROCEDURE — 99213 OFFICE O/P EST LOW 20 MIN: CPT | Mod: S$GLB,,, | Performed by: INTERNAL MEDICINE

## 2024-02-14 PROCEDURE — 93010 ELECTROCARDIOGRAM REPORT: CPT | Mod: ,,, | Performed by: INTERNAL MEDICINE

## 2024-02-14 PROCEDURE — 3078F DIAST BP <80 MM HG: CPT | Mod: CPTII,S$GLB,, | Performed by: INTERNAL MEDICINE

## 2024-02-14 PROCEDURE — 99999 PR PBB SHADOW E&M-EST. PATIENT-LVL III: CPT | Mod: PBBFAC,,, | Performed by: INTERNAL MEDICINE

## 2024-02-14 PROCEDURE — 1159F MED LIST DOCD IN RCRD: CPT | Mod: CPTII,S$GLB,, | Performed by: INTERNAL MEDICINE

## 2024-02-14 PROCEDURE — 93005 ELECTROCARDIOGRAM TRACING: CPT

## 2024-02-14 PROCEDURE — 3008F BODY MASS INDEX DOCD: CPT | Mod: CPTII,S$GLB,, | Performed by: INTERNAL MEDICINE

## 2024-02-14 PROCEDURE — 3074F SYST BP LT 130 MM HG: CPT | Mod: CPTII,S$GLB,, | Performed by: INTERNAL MEDICINE

## 2024-02-14 NOTE — PROGRESS NOTES
Subjective:   Patient ID:  Jhoana Sierra is a 59 y.o. female who presents for follow up of No chief complaint on file.      HPI  2/14/2022 DR COOK  56 yo female, came in for post STEMI/ coronary dissection f/u  PMH h/o Colon cancer emoval parts in  no chemo and XRT  and in remission, fibromyalgia  01/24/2022 admitted to [facility] for STEMI. Emergent cath showed spontaneous mild to distal LAD dissection, normal EF and filling pressure. ascending TAA 4 cm.  Chest CTA showed no PE and TAA  Brain CT negative.   ABD CTA showed slight beaded appurtenance of shelbi l A. Typical for fibromuscular dysplasia  Vascularis workup negative for NIRANJAN CRP KHANG and ANCA.   D/c with ASA lipitor and metoprolol  Now no chest pain.      7/1/2022   HAS OCCASIONAL SHARP CHEST PAIN DIFFERENT THAN HER ORIGINAL EVENT  WITH CORONARY DISSECTION OF DISTAL LAD. SHE ASCENCIO SNO EXERTIONAL SYMPTOMS BACK TO YARD WORK AND BEING PHYSICALLY ACTIVE. HAS NO CHF SYMPTOMS HAS NO PALPITATION. SHE NEEDS SURGERY ON HER OVARIAN CYST. SHE IS HERE FOR CARDIAC CLEARANCE. NO H/O HTN OR ANY FAMILY H/O SCAT. HAD FIBROMUSCULAR DYSPLASIA ON RENAL ARTERIES.HAD INCREASED LFT NECESSITATING CESSATION OF STATIN THERAPY.      1/6/2023   Asymptomatic doing well clinically has not been exercising regularily tries to be compliant with diet takes meds regularily family stress. No angina .had hysterectomy in September no cardiac issues.  7/27/2023   Has been doing well has not had benefit from the cholest off she is intolerant to statins. She had stemi from spontaneous dissection she needs lipid therapy. I think [id number] is a good option for her. Asymptomatic cardiac wsie has not exercised due to foot problem. Has been very compliant with diet.     2/14/2024   Gets occsional leg cramps from repatha has other side effects stomach complaints  get nausea at times. Get shakes nervousness.   Past Medical History:   Diagnosis Date    Adenocarcinoma of colon     Colon cancer 2020     Heart attack 01/22/2022    PONV (postoperative nausea and vomiting)        Past Surgical History:   Procedure Laterality Date    COLONOSCOPY N/A 08/25/2020    Procedure: COLONOSCOPY;  Surgeon: Rianna Dillon MD;  Location: [facility];  Service: Endoscopy;  Laterality: N/A;    COLONOSCOPY N/A 01/07/2022    Procedure: COLONOSCOPY;  Surgeon: Ziggy Hannah MD;  Location: [facility] ENDO;  Service: General;  Laterality: N/A;    DIAGNOSTIC LAPAROSCOPY N/A 09/07/2022    Procedure: LAPAROSCOPY, DIAGNOSTIC;  Surgeon: Ziggy Hannah MD;  Location: [facility] OR;  Service: General;  Laterality: N/A;    FLEXIBLE SIGMOIDOSCOPY N/A 10/08/2020    Procedure: SIGMOIDOSCOPY, FLEXIBLE;  Surgeon: Ziggy Hannah MD;  Location: [facility] OR;  Service: General;  Laterality: N/A;    HYSTERECTOMY      INJECTION OF ANESTHETIC AGENT INTO TISSUE PLANE DEFINED BY TRANSVERSUS ABDOMINIS MUSCLE N/A 10/08/2020    Procedure: BLOCK, TRANSVERSUS ABDOMINIS PLANE;  Surgeon: Ziggy Hannah MD;  Location: [facility];  Service: General;  Laterality: N/A;    LAPAROSCOPIC LYSIS OF ADHESIONS  09/07/2022    Procedure: LYSIS, ADHESIONS, LAPAROSCOPIC;  Surgeon: Ziggy Hannah MD;  Location: [facility];  Service: General;;    LAPAROSCOPIC SIGMOIDECTOMY N/A 10/08/2020    Procedure: COLECTOMY, SIGMOID, LAPAROSCOPIC;  Surgeon: Ziggy Hannah MD;  Location: [facility] OR;  Service: General;  Laterality: N/A;  lower anterior resection  Proctosigmoidectomy    LAPAROTOMY N/A 10/08/2020    Procedure: LAPAROTOMY;  Surgeon: Ziggy Hannah MD;  Location: [facility] OR;  Service: General;  Laterality: N/A;    OVARIAN CYST REMOVAL      WISDOM TOOTH EXTRACTION      XI ROBOTIC HYSTERECTOMY, WITH SALPINGO-OOPHORECTOMY Bilateral 09/07/2022    Procedure: XI ROBOTIC HYSTERECTOMY,WITH SALPINGO-OOPHORECTOMY;  Surgeon: Bela Noel MD;  Location: [facility];  Service: OB/GYN;  Laterality: Bilateral;       Social History     Tobacco Use    Smoking status: Never    Smokeless tobacco:  Never   Substance Use Topics    Alcohol use: Yes     Comment: Occ use; HOLD 72HRS PRIOR TO SURGERY    Drug use: No       Family History   Problem Relation Age of Onset    Fibromyalgia Sister     Diabetes Neg Hx     Heart disease Neg Hx        Current Outpatient Medications   Medication Sig    aspirin 81 mg Cap 81 MG  .    evolocumab (REPATHA SURECLICK) 140 mg/mL PnIj Inject 1 mL (140 mg total) into the skin every 14 (fourteen) days.    metoprolol succinate (TOPROL-XL) 25 MG 24 hr tablet Take 1 tablet by mouth once daily    tiZANidine 4 mg Cap Take 1 capsule by mouth three times daily as needed     No current facility-administered medications for this visit.     Current Outpatient Medications on File Prior to Visit   Medication Sig    aspirin 81 mg Cap 81 MG  .    evolocumab (REPATHA SURECLICK) 140 mg/mL PnIj Inject 1 mL (140 mg total) into the skin every 14 (fourteen) days.    metoprolol succinate (TOPROL-XL) 25 MG 24 hr tablet Take 1 tablet by mouth once daily    tiZANidine 4 mg Cap Take 1 capsule by mouth three times daily as needed     No current facility-administered medications on file prior to visit.     Review of patient's allergies indicates:  No Known Allergies   Review of Systems   Constitutional: Negative for diaphoresis, malaise/fatigue and weight gain.   HENT:  Negative for hoarse voice.    Eyes:  Negative for double vision and visual disturbance.   Cardiovascular:  Negative for chest pain, claudication, cyanosis, dyspnea on exertion, irregular heartbeat, leg swelling, near-syncope, orthopnea, palpitations, paroxysmal nocturnal dyspnea and syncope.   Respiratory:  Negative for cough, hemoptysis, shortness of breath and snoring.    Hematologic/Lymphatic: Negative for bleeding problem. Does not bruise/bleed easily.   Skin:  Negative for color change and poor wound healing.   Musculoskeletal:  Positive for muscle cramps. Negative for muscle weakness and myalgias.   Gastrointestinal:  Positive for bloating  and nausea. Negative for abdominal pain, change in bowel habit, diarrhea, heartburn, hematemesis, hematochezia and melena.   Neurological:  Negative for excessive daytime sleepiness, dizziness, headaches, light-headedness, loss of balance, numbness and weakness.   Psychiatric/Behavioral:  Negative for memory loss. The patient does not have insomnia.    Allergic/Immunologic: Negative for hives.       Objective:   Physical Exam  Constitutional:       General: She is not in acute distress.     Appearance: Normal appearance. She is well-developed. She is not ill-appearing.   HENT:      Head: Normocephalic and atraumatic.   Eyes:      General: No scleral icterus.     Pupils: Pupils are equal, round, and reactive to light.   Neck:      Thyroid: No thyromegaly.      Vascular: Normal carotid pulses. No carotid bruit, hepatojugular reflux or JVD.      Trachea: No tracheal deviation.   Cardiovascular:      Rate and Rhythm: Normal rate and regular rhythm.      Pulses: Normal pulses.      Heart sounds: Normal heart sounds. No murmur heard.     No friction rub. No gallop.   Pulmonary:      Effort: Pulmonary effort is normal. No respiratory distress.      Breath sounds: Normal breath sounds. No wheezing, rhonchi or rales.   Chest:      Chest wall: No tenderness.   Abdominal:      General: Bowel sounds are normal. There is no abdominal bruit.      Palpations: Abdomen is soft. There is no hepatomegaly or pulsatile mass.      Tenderness: There is no abdominal tenderness.   Musculoskeletal:      Right shoulder: No deformity.      Cervical back: Normal range of motion and neck supple.   Skin:     General: Skin is warm and dry.      Findings: No erythema or rash.      Nails: There is no clubbing.   Neurological:      Mental Status: She is alert and oriented to person, place, and time.      Cranial Nerves: No cranial nerve deficit.      Coordination: Coordination normal.   Psychiatric:         Speech: Speech normal.         Behavior:  "Behavior normal.       Vitals:    02/14/24 1324 02/14/24 1326   BP: 123/81 (!) 143/75   BP Location: Left arm Right arm   Patient Position: Sitting Sitting   Pulse: 76 77   SpO2: 96%    Weight: 81 kg (178 lb 9.2 oz)    Height: 5' 8" (1.727 m)      Lab Results   Component Value Date    CHOL 150 01/18/2024    CHOL 208 (H) 07/07/2023    CHOL 212 (H) 12/06/2022      Body mass index is 27.15 kg/m².   Lab Results   Component Value Date    HGBA1C 5.4 07/07/2023      BMP  Lab Results   Component Value Date     01/18/2024    K 4.5 01/18/2024     01/18/2024    CO2 26 01/18/2024    BUN 19 01/18/2024    CREATININE 0.8 01/18/2024    CALCIUM 9.9 01/18/2024    ANIONGAP 11 01/18/2024    EGFRNORACEVR >60.0 01/18/2024      Lab Results   Component Value Date    HDL 70 01/18/2024    HDL 63 07/07/2023    HDL 68 12/06/2022     Lab Results   Component Value Date    LDLCALC 61.0 (L) 01/18/2024    LDLCALC 127.4 07/07/2023    LDLCALC 125.4 12/06/2022     Lab Results   Component Value Date    TRIG 95 01/18/2024    TRIG 88 07/07/2023    TRIG 93 12/06/2022     Lab Results   Component Value Date    CHOLHDL 46.7 01/18/2024    CHOLHDL 30.3 07/07/2023    CHOLHDL 32.1 12/06/2022       Chemistry        Component Value Date/Time     01/18/2024 0911    K 4.5 01/18/2024 0911     01/18/2024 0911    CO2 26 01/18/2024 0911    BUN 19 01/18/2024 0911    CREATININE 0.8 01/18/2024 0911    GLU 91 01/18/2024 0911        Component Value Date/Time    CALCIUM 9.9 01/18/2024 0911    ALKPHOS 100 01/18/2024 0911    AST 22 01/18/2024 0911    ALT 33 01/18/2024 0911    BILITOT 0.5 01/18/2024 0911    ESTGFRAFRICA >60.0 07/25/2022 1037    EGFRNONAA >60.0 07/25/2022 1037          Lab Results   Component Value Date    TSH 3.352 07/07/2023     No results found for: "INR", "PROTIME"  Lab Results   Component Value Date    WBC 3.79 (L) 07/07/2023    HGB 13.3 07/07/2023    HCT 40.3 07/07/2023    MCV 94 07/07/2023     07/07/2023     BMP  Sodium "   Date Value Ref Range Status   01/18/2024 139 136 - 145 mmol/L Final     Potassium   Date Value Ref Range Status   01/18/2024 4.5 3.5 - 5.1 mmol/L Final     Chloride   Date Value Ref Range Status   01/18/2024 102 95 - 110 mmol/L Final     CO2   Date Value Ref Range Status   01/18/2024 26 23 - 29 mmol/L Final     BUN   Date Value Ref Range Status   01/18/2024 19 6 - 20 mg/dL Final     Creatinine   Date Value Ref Range Status   01/18/2024 0.8 0.5 - 1.4 mg/dL Final     Calcium   Date Value Ref Range Status   01/18/2024 9.9 8.7 - 10.5 mg/dL Final     Anion Gap   Date Value Ref Range Status   01/18/2024 11 8 - 16 mmol/L Final     eGFR if    Date Value Ref Range Status   07/25/2022 >60.0 >60 mL/min/1.73 m^2 Final     eGFR if non    Date Value Ref Range Status   07/25/2022 >60.0 >60 mL/min/1.73 m^2 Final     Comment:     Calculation used to obtain the estimated glomerular filtration  rate (eGFR) is the CKD-EPI equation.        CrCl cannot be calculated (Patient's most recent lab result is older than the maximum 7 days allowed.).    Assessment:     1. Allergy to statin medication    2. Positive FIT (fecal immunochemical test)    3. Adenocarcinoma of colon    4. Spontaneous dissection of coronary artery    5. Lymphocytic colitis    6. Fibromuscular dysplasia    7. Elevated LFTs    8. Abnormal EKG      Hlp responded well to pcsk9 inhibitors has some side effects will monitor ldl on target . Suggest magnesium coq10 pickle juice  Old mi s/p coronary dissection resolved asymptomatic EKG unrevealing continue rf modification.  Obesity A1c normal discussed weight loss exercise  Bp controlled low salt diet emphaszied.   Plan:   Continue current therapy  Cardiac low salt diet.  Risk factor modification and excercise program./weight loss  F/u in 6 months with lipid cmp

## 2024-04-22 ENCOUNTER — LAB VISIT (OUTPATIENT)
Dept: LAB | Facility: HOSPITAL | Age: 59
End: 2024-04-22
Attending: COLON & RECTAL SURGERY
Payer: COMMERCIAL

## 2024-04-22 DIAGNOSIS — Z85.038 PERSONAL HISTORY OF COLON CANCER: ICD-10-CM

## 2024-04-22 LAB — CEA SERPL-MCNC: 2 NG/ML (ref 0–5)

## 2024-04-22 PROCEDURE — 36415 COLL VENOUS BLD VENIPUNCTURE: CPT | Mod: PO | Performed by: COLON & RECTAL SURGERY

## 2024-04-22 PROCEDURE — 82378 CARCINOEMBRYONIC ANTIGEN: CPT | Performed by: COLON & RECTAL SURGERY

## 2024-04-29 ENCOUNTER — OFFICE VISIT (OUTPATIENT)
Dept: SURGERY | Facility: CLINIC | Age: 59
End: 2024-04-29
Payer: COMMERCIAL

## 2024-04-29 VITALS
HEIGHT: 68 IN | OXYGEN SATURATION: 90 % | SYSTOLIC BLOOD PRESSURE: 138 MMHG | DIASTOLIC BLOOD PRESSURE: 82 MMHG | BODY MASS INDEX: 26.06 KG/M2 | HEART RATE: 57 BPM | TEMPERATURE: 98 F | WEIGHT: 171.94 LBS

## 2024-04-29 DIAGNOSIS — Z85.038 PERSONAL HISTORY OF COLON CANCER: Primary | ICD-10-CM

## 2024-04-29 DIAGNOSIS — C18.9 COLON ADENOCARCINOMA: ICD-10-CM

## 2024-04-29 PROCEDURE — 1159F MED LIST DOCD IN RCRD: CPT | Mod: CPTII,S$GLB,, | Performed by: COLON & RECTAL SURGERY

## 2024-04-29 PROCEDURE — 99214 OFFICE O/P EST MOD 30 MIN: CPT | Mod: S$GLB,,, | Performed by: COLON & RECTAL SURGERY

## 2024-04-29 PROCEDURE — 3075F SYST BP GE 130 - 139MM HG: CPT | Mod: CPTII,S$GLB,, | Performed by: COLON & RECTAL SURGERY

## 2024-04-29 PROCEDURE — 99999 PR PBB SHADOW E&M-EST. PATIENT-LVL III: CPT | Mod: PBBFAC,,, | Performed by: COLON & RECTAL SURGERY

## 2024-04-29 PROCEDURE — 3008F BODY MASS INDEX DOCD: CPT | Mod: CPTII,S$GLB,, | Performed by: COLON & RECTAL SURGERY

## 2024-04-29 PROCEDURE — 3079F DIAST BP 80-89 MM HG: CPT | Mod: CPTII,S$GLB,, | Performed by: COLON & RECTAL SURGERY

## 2024-04-29 NOTE — PROGRESS NOTES
History & Physical    SUBJECTIVE:     CC: rectosigmoid adenocarcinoma  Ref MD: Rianna Dillon MD    History of Present Illness:  Patient is a 59 y.o. 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  Patient had a positive fit test in June 2020 which prompted the scheduling of a colonoscopy.  This colonoscopy was performed in August 2020 and showed a mass in the rectosigmoid region with biopsies resulting in adenocarcinoma.  Patient states that she had seen intermittent blood in her stool for 3 months prior to this.  This was her 1st colonoscopy ever.  She denies any associated fever, chills, nausea, vomiting, diarrhea, constipation, weight loss or night sweats.  She has no known personal family history of colorectal cancer or colonic polyps previously.  Her only significant abdominal surgical history is an open ovarian cyst removal via Pfannenstiel incision.    10/8/2020: Lap LAR (Stage 1: T2N0 adenoCA)    Interval history:  Since last clinic visit, the patient has done well.  She is having normal bowel function without hematochezia or melena.  Is tolerating regular diet. She denies any fever, chills, nausea, vomiting.    Review of patient's allergies indicates:  No Known Allergies    Current Outpatient Medications   Medication Sig Dispense Refill    aspirin 81 mg Cap 81 MG  .      evolocumab (REPATHA SURECLICK) 140 mg/mL PnIj Inject 1 mL (140 mg total) into the skin every 14 (fourteen) days. 6 mL 3    metoprolol succinate (TOPROL-XL) 25 MG 24 hr tablet Take 1 tablet by mouth once daily 90 tablet 3    tiZANidine 4 mg Cap Take 1 capsule by mouth three times daily as needed 30 capsule 0     No current facility-administered medications for this visit.       Past Medical History:   Diagnosis Date    Adenocarcinoma of colon     Colon cancer 2020    Heart attack 01/22/2022    PONV (postoperative nausea and vomiting)      Past Surgical History:   Procedure Laterality Date    COLONOSCOPY N/A 08/25/2020     Procedure: COLONOSCOPY;  Surgeon: Rianna Dillon MD;  Location: [facility];  Service: Endoscopy;  Laterality: N/A;    COLONOSCOPY N/A 01/07/2022    Procedure: COLONOSCOPY;  Surgeon: Ziggy Hannah MD;  Location: [facility] ENDO;  Service: General;  Laterality: N/A;    DIAGNOSTIC LAPAROSCOPY N/A 09/07/2022    Procedure: LAPAROSCOPY, DIAGNOSTIC;  Surgeon: Ziggy Hannah MD;  Location: [facility] OR;  Service: General;  Laterality: N/A;    FLEXIBLE SIGMOIDOSCOPY N/A 10/08/2020    Procedure: SIGMOIDOSCOPY, FLEXIBLE;  Surgeon: Ziggy Hannah MD;  Location: [facility] OR;  Service: General;  Laterality: N/A;    HYSTERECTOMY      INJECTION OF ANESTHETIC AGENT INTO TISSUE PLANE DEFINED BY TRANSVERSUS ABDOMINIS MUSCLE N/A 10/08/2020    Procedure: BLOCK, TRANSVERSUS ABDOMINIS PLANE;  Surgeon: Ziggy Hannah MD;  Location: [facility] OR;  Service: General;  Laterality: N/A;    LAPAROSCOPIC LYSIS OF ADHESIONS  09/07/2022    Procedure: LYSIS, ADHESIONS, LAPAROSCOPIC;  Surgeon: Ziggy Hannah MD;  Location: [facility] OR;  Service: General;;    LAPAROSCOPIC SIGMOIDECTOMY N/A 10/08/2020    Procedure: COLECTOMY, SIGMOID, LAPAROSCOPIC;  Surgeon: Ziggy Hannah MD;  Location: [facility] OR;  Service: General;  Laterality: N/A;  lower anterior resection  Proctosigmoidectomy    LAPAROTOMY N/A 10/08/2020    Procedure: LAPAROTOMY;  Surgeon: Ziggy Hannah MD;  Location: [facility] OR;  Service: General;  Laterality: N/A;    OVARIAN CYST REMOVAL      WISDOM TOOTH EXTRACTION      XI ROBOTIC HYSTERECTOMY, WITH SALPINGO-OOPHORECTOMY Bilateral 09/07/2022    Procedure: XI ROBOTIC HYSTERECTOMY,WITH SALPINGO-OOPHORECTOMY;  Surgeon: Bela Noel MD;  Location: [facility] OR;  Service: OB/GYN;  Laterality: Bilateral;     Family History   Problem Relation Name Age of Onset    Fibromyalgia Sister      Diabetes Neg Hx      Heart disease Neg Hx       Social History     Tobacco Use    Smoking status: Never    Smokeless tobacco: Never   Substance Use Topics     "Alcohol use: Yes     Comment: Occ use; HOLD 72HRS PRIOR TO SURGERY    Drug use: No        Review of Systems:  Review of Systems   Constitutional:  Negative for activity change, appetite change, chills, fatigue, fever and unexpected weight change.   HENT:  Negative for congestion, ear pain, sore throat and trouble swallowing.    Eyes:  Negative for pain, redness and itching.   Respiratory:  Negative for cough, shortness of breath and wheezing.    Cardiovascular:  Negative for chest pain, palpitations and leg swelling.   Gastrointestinal:  Negative for abdominal distention, abdominal pain, blood in stool, constipation, diarrhea, nausea, rectal pain and vomiting.   Endocrine: Negative for cold intolerance, heat intolerance and polyuria.   Genitourinary:  Negative for dysuria, flank pain, frequency and hematuria.   Musculoskeletal:  Negative for gait problem, joint swelling and neck pain.   Skin:  Negative for color change, rash and wound.   Allergic/Immunologic: Negative for environmental allergies and immunocompromised state.   Neurological:  Negative for dizziness, speech difficulty, weakness and numbness.   Psychiatric/Behavioral:  Negative for agitation, confusion and hallucinations.        OBJECTIVE:     Vital Signs (Most Recent)  Temp: 98.1 °F (36.7 °C) (04/29/24 1022)  Pulse: (!) 57 (04/29/24 1022)  BP: 138/82 (04/29/24 1022)  SpO2: (!) 90 % (04/29/24 1022)  5' 8" (1.727 m)  78 kg (171 lb 15.3 oz)     Physical Exam:  Physical Exam  Vitals reviewed.   Constitutional:       Appearance: She is well-developed.   HENT:      Head: Normocephalic and atraumatic.   Eyes:      Conjunctiva/sclera: Conjunctivae normal.   Neck:      Thyroid: No thyromegaly.   Cardiovascular:      Rate and Rhythm: Bradycardia present.   Pulmonary:      Effort: Pulmonary effort is normal. No respiratory distress.   Abdominal:      General: There is no distension.      Palpations: Abdomen is soft. There is no mass.      Tenderness: There is no " abdominal tenderness.      Comments: Well-healed incisions without SOI   Musculoskeletal:         General: No tenderness. Normal range of motion.      Cervical back: Normal range of motion.   Skin:     General: Skin is warm and dry.      Capillary Refill: Capillary refill takes less than 2 seconds.      Findings: No rash.   Neurological:      Mental Status: She is alert and oriented to person, place, and time.         Laboratory  Lab Results   Component Value Date    WBC 3.79 (L) 07/07/2023    HGB 13.3 07/07/2023    HCT 40.3 07/07/2023     07/07/2023    CHOL 150 01/18/2024    TRIG 95 01/18/2024    HDL 70 01/18/2024    ALT 33 01/18/2024    AST 22 01/18/2024     01/18/2024    K 4.5 01/18/2024     01/18/2024    CREATININE 0.8 01/18/2024    BUN 19 01/18/2024    CO2 26 01/18/2024    TSH 3.352 07/07/2023    HGBA1C 5.4 07/07/2023                Component Ref Range & Units 7 d ago  (4/22/24) 3 mo ago  (1/18/24) 6 mo ago  (10/19/23) 9 mo ago  (7/7/23) 1 yr ago  (2/8/23) 1 yr ago  (10/17/22) 1 yr ago  (7/25/22)   CEA 0.0 - 5.0 ng/mL 2.0 1.9 CM 1.9 CM 1.8 CM 2.0 CM 2.0 CM <1.7 CM       Diagnostic Results:  Colonoscopy 01/2022: Reviewed    CT Dec 2023:  FINDINGS:  CT chest:  Heart size normal.  No pericardial effusions.  Normal vasculature.     Negative for mediastinal lymphadenopathy.  Lungs are clear with no suspicious nodularity.  No pleural effusions.     No suspicious osseous findings.     CT abdomen and pelvis:  Normal liver, spleen, pancreas, gallbladder, adrenals.  Small nonobstructing 3 mm calculus upper pole left kidney.  Kidneys otherwise unremarkable.  No adenopathy.  No ascites.     Staple line in the rectosigmoid.  No pelvic mass or adenopathy.  Hysterectomy.  Normal bladder.     No suspicious osseous findings.  No advanced atherosclerosis.           Impression:     Stable exam without evidence of recurrent or metastatic disease in the chest, abdomen, or pelvis.    ASSESSMENT/PLAN:     59 y.o. 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

## 2024-05-20 ENCOUNTER — ON-DEMAND VIRTUAL (OUTPATIENT)
Dept: URGENT CARE | Facility: CLINIC | Age: 59
End: 2024-05-20
Payer: COMMERCIAL

## 2024-05-20 DIAGNOSIS — R30.0 DYSURIA: Primary | ICD-10-CM

## 2024-05-20 PROCEDURE — 99203 OFFICE O/P NEW LOW 30 MIN: CPT | Mod: 95,,, | Performed by: NURSE PRACTITIONER

## 2024-05-20 RX ORDER — NITROFURANTOIN 25; 75 MG/1; MG/1
100 CAPSULE ORAL EVERY 12 HOURS
Qty: 14 CAPSULE | Refills: 0 | Status: SHIPPED | OUTPATIENT
Start: 2024-05-20 | End: 2024-05-27

## 2024-05-20 RX ORDER — PHENAZOPYRIDINE HYDROCHLORIDE 100 MG/1
100 TABLET, FILM COATED ORAL 3 TIMES DAILY PRN
Qty: 6 TABLET | Refills: 0 | Status: SHIPPED | OUTPATIENT
Start: 2024-05-20 | End: 2024-05-22

## 2024-05-20 NOTE — PROGRESS NOTES
Subjective:      Patient ID: Jhoana Sierra is a 59 y.o. female.    Vitals:  vitals were not taken for this visit.     Chief Complaint: Urinary Tract Infection      Visit Type: TELE AUDIOVISUAL    Present with the patient at the time of consultation: TELEMED PRESENT WITH PATIENT: None, at home    Past Medical History:   Diagnosis Date    Adenocarcinoma of colon     Colon cancer 2020    Heart attack 01/22/2022    PONV (postoperative nausea and vomiting)      Past Surgical History:   Procedure Laterality Date    COLONOSCOPY N/A 08/25/2020    Procedure: COLONOSCOPY;  Surgeon: Rianna Dillon MD;  Location: [facility];  Service: Endoscopy;  Laterality: N/A;    COLONOSCOPY N/A 01/07/2022    Procedure: COLONOSCOPY;  Surgeon: Ziggy Hannah MD;  Location: [facility] ENDO;  Service: General;  Laterality: N/A;    DIAGNOSTIC LAPAROSCOPY N/A 09/07/2022    Procedure: LAPAROSCOPY, DIAGNOSTIC;  Surgeon: Ziggy Hannah MD;  Location: [facility] OR;  Service: General;  Laterality: N/A;    FLEXIBLE SIGMOIDOSCOPY N/A 10/08/2020    Procedure: SIGMOIDOSCOPY, FLEXIBLE;  Surgeon: Ziggy Hannah MD;  Location: [facility] OR;  Service: General;  Laterality: N/A;    HYSTERECTOMY      INJECTION OF ANESTHETIC AGENT INTO TISSUE PLANE DEFINED BY TRANSVERSUS ABDOMINIS MUSCLE N/A 10/08/2020    Procedure: BLOCK, TRANSVERSUS ABDOMINIS PLANE;  Surgeon: Ziggy Hannah MD;  Location: [facility] OR;  Service: General;  Laterality: N/A;    LAPAROSCOPIC LYSIS OF ADHESIONS  09/07/2022    Procedure: LYSIS, ADHESIONS, LAPAROSCOPIC;  Surgeon: Ziggy Hannah MD;  Location: [facility] OR;  Service: General;;    LAPAROSCOPIC SIGMOIDECTOMY N/A 10/08/2020    Procedure: COLECTOMY, SIGMOID, LAPAROSCOPIC;  Surgeon: Ziggy Hannah MD;  Location: [facility] OR;  Service: General;  Laterality: N/A;  lower anterior resection  Proctosigmoidectomy    LAPAROTOMY N/A 10/08/2020    Procedure: LAPAROTOMY;  Surgeon: Ziggy Hannah MD;  Location: [facility] OR;  Service: General;   Laterality: N/A;    OVARIAN CYST REMOVAL      WISDOM TOOTH EXTRACTION      XI ROBOTIC HYSTERECTOMY, WITH SALPINGO-OOPHORECTOMY Bilateral 09/07/2022    Procedure: XI ROBOTIC HYSTERECTOMY,WITH SALPINGO-OOPHORECTOMY;  Surgeon: Bela Noel MD;  Location: [facility];  Service: OB/GYN;  Laterality: Bilateral;     Review of patient's allergies indicates:  No Known Allergies  Current Outpatient Medications on File Prior to Visit   Medication Sig Dispense Refill    aspirin 81 mg Cap 81 MG  .      evolocumab (REPATHA SURECLICK) 140 mg/mL PnIj Inject 1 mL (140 mg total) into the skin every 14 (fourteen) days. 6 mL 3    metoprolol succinate (TOPROL-XL) 25 MG 24 hr tablet Take 1 tablet by mouth once daily 90 tablet 3    tiZANidine 4 mg Cap Take 1 capsule by mouth three times daily as needed 30 capsule 0     No current facility-administered medications on file prior to visit.     Family History   Problem Relation Name Age of Onset    Fibromyalgia Sister      Diabetes Neg Hx      Heart disease Neg Hx         Medications Ordered                [facility] [address] [place] [address] [facility]   [id number] [place] [id number]    Telephone: [phone]   Fax: [phone]   Hours: Not open 24 hours                         E-Prescribed (2 of 2)              nitrofurantoin, macrocrystal-monohydrate, (MACROBID) 100 MG capsule    Sig: Take 1 capsule (100 mg total) by mouth every 12 (twelve) hours. for 7 days       Start: 5/20/24     Quantity: 14 capsule Refills: 0                         phenazopyridine (PYRIDIUM) 100 MG tablet    Sig: Take 1 tablet (100 mg total) by mouth 3 (three) times daily as needed for Pain.       Start: 5/20/24     Quantity: 6 tablet Refills: 0                           Ohs Peq Odvv Intake    5/20/2024  8:35 AM CDT - Filed by Patient   What is your current physical address in the event of a medical emergency? [address] [place] [address]   Are you able to take your vital  signs? Yes   Systolic Blood Pressure: 138   Diastolic Blood Pressure: 75   Weight: 162   Height: 64   Pulse: 71   Temperature: 99.7   Respiration rate:    Pulse Oxygen:    Please attach any relevant images or files          UTI symptoms for 2 days. Reports frequency, urgency and dysuria. No hematuria. No f/c/n/v. No vaginal discharge. No severe back, flank or pelvic pain. Mild relief with AZO and cranberry juice initially. Symptoms persist now.       Urinary Tract Infection   Associated symptoms include frequency and urgency. Pertinent negatives include no chills, flank pain, hematuria, nausea or vomiting.       Constitution: Negative for chills and fever.   Gastrointestinal:  Negative for abdominal pain, nausea and vomiting.   Genitourinary:  Positive for dysuria, frequency and urgency. Negative for flank pain, hematuria, vaginal discharge, vaginal odor and pelvic pain.   Musculoskeletal:  Negative for back pain.        Objective:   The physical exam was conducted virtually.  Physical Exam   Constitutional: She is oriented to person, place, and time. She does not appear ill. No distress.   HENT:   Head: Normocephalic and atraumatic.   Nose: Nose normal.   Eyes: Extraocular movement intact   Pulmonary/Chest: Effort normal.   Abdominal: Normal appearance.   Musculoskeletal: Normal range of motion.         General: Normal range of motion.   Neurological: no focal deficit. She is alert and oriented to person, place, and time.   Psychiatric: Her behavior is normal. Mood normal.   Vitals reviewed.      Assessment:     1. Dysuria        Plan:   Patient encouraged to monitor symptoms closely and instructed to follow-up for new or worsening symptoms. Further, in-person, evaluation may be necessary for continued treatment. Please follow up with your primary care doctor or specialist as needed. Verbally discussed plan. Patient confirms understanding and is in agreement with treatment and plan.     You must understand that  you've received a Virtual Care evaluation only and that you may be released before all your medical problems are known or treated. You, the patient, will arrange for follow up care as instructed.      Dysuria  -     nitrofurantoin, macrocrystal-monohydrate, (MACROBID) 100 MG capsule; Take 1 capsule (100 mg total) by mouth every 12 (twelve) hours. for 7 days  Dispense: 14 capsule; Refill: 0  -     phenazopyridine (PYRIDIUM) 100 MG tablet; Take 1 tablet (100 mg total) by mouth 3 (three) times daily as needed for Pain.  Dispense: 6 tablet; Refill: 0

## 2024-06-09 NOTE — ANESTHESIA PREPROCEDURE EVALUATION
08/24/2020  Jhoana Sierra is a 55 y.o., female.    Anesthesia Evaluation    I have reviewed the Patient Summary Reports.    I have reviewed the Nursing Notes. I have reviewed the NPO Status.   I have reviewed the Medications.     Review of Systems  Anesthesia Hx:  Hx of Anesthetic complications PONV. Neg history of prior surgery. Personal Hx of Anesthesia complications, Post-Operative Nausea/Vomiting   Social:  Non-Smoker, Social Alcohol Use    Cardiovascular:   Had echo 2 years ago for eval of murmur, WNL.   Musculoskeletal:   Arthritis     Neurological:   Fibromyalgia.       Physical Exam  General:  Well nourished    Airway/Jaw/Neck:  Airway Findings: Mouth Opening: Normal Tongue: Normal  General Airway Assessment: Adult  Mallampati: II  TM Distance: Normal, at least 6 cm  Jaw/Neck Findings:  Neck ROM: Normal ROM  Neck Findings:     Eyes/Ears/Nose:  Eyes/Ears/Nose Findings:    Dental:  Dental Findings: In tact   Chest/Lungs:  Chest/Lungs Findings: Clear to auscultation, Normal Respiratory Rate     Heart/Vascular:  Heart Findings: Rate: Normal  Rhythm: Regular Rhythm  Sounds: Normal  Heart murmur: negative Vascular Findings: Normal    Abdomen:  Abdomen Findings: Normal    Musculoskeletal:  Musculoskeletal Findings: Normal   Skin:  Skin Findings: Normal    Mental Status:  Mental Status Findings:  Alert and Oriented         Anesthesia Plan  Type of Anesthesia, risks & benefits discussed:  Anesthesia Type:  MAC  Patient's Preference:   Intra-op Monitoring Plan: standard ASA monitors  Intra-op Monitoring Plan Comments:   Post Op Pain Control Plan: per primary service following discharge from PACU  Post Op Pain Control Plan Comments:   Induction:   IV  Beta Blocker:  Patient is not currently on a Beta-Blocker (No further documentation required).       Informed Consent: Patient understands risks and agrees  with Anesthesia plan.  Questions answered. Anesthesia consent signed with patient.  ASA Score: 2     Day of Surgery Review of History & Physical:    H&P update referred to the provider.         Ready For Surgery From Anesthesia Perspective.        Wound care

## 2024-06-19 ENCOUNTER — TELEPHONE (OUTPATIENT)
Dept: SURGERY | Facility: CLINIC | Age: 59
End: 2024-06-19
Payer: COMMERCIAL

## 2024-06-19 NOTE — TELEPHONE ENCOUNTER
RN attempted to call Martina back regarding message left. Number not in service, 3 attempts to call phone number. Message sent to Silvia Jo to confirm phone number.     ----- Message from Silvia Jo sent at 6/19/2024 11:15 AM CDT -----  Contact: Samuel Mack  ..Type:  Patient Requesting Call    Who Called: Samuel Mack   Does the patient know what this is regarding?: confirming if fax was received for approval letter for study, also wanted confirmation of pt cancer diagnosis  Would the patient rather a call back or a response via [facility]? call  Best Call Back Number: [phone] direct line   Additional Information:

## 2024-06-19 NOTE — TELEPHONE ENCOUNTER
RN called Martina after receiving correct phone number. No answer, left voicemail with callback number.    ----- Message from Silvia Jo sent at 6/19/2024 11:31 AM CDT -----  Contact: Samuel Mack  Sandeep Milian its [phone], I thought she said [phone].  ----- Message -----  From: Jackie Alonzo RN  Sent: 6/19/2024  11:22 AM CDT  To: Silvia Jo    The callback number is not in service. Do you have the number that called you?  ----- Message -----  From: Silvia Jo  Sent: 6/19/2024  11:17 AM CDT  To: Brielle Trinh Staff    ..Type:  Patient Requesting Call    Who Called: Samuel Mack   Does the patient know what this is regarding?: confirming if fax was received for approval letter for study, also wanted confirmation of pt cancer diagnosis  Would the patient rather a call back or a response via [facility]? call  Best Call Back Number: [phone] direct line   Additional Information:

## 2024-06-21 ENCOUNTER — OFFICE VISIT (OUTPATIENT)
Dept: INTERNAL MEDICINE | Facility: CLINIC | Age: 59
End: 2024-06-21
Payer: COMMERCIAL

## 2024-06-21 VITALS
OXYGEN SATURATION: 97 % | SYSTOLIC BLOOD PRESSURE: 110 MMHG | BODY MASS INDEX: 27.59 KG/M2 | HEIGHT: 64 IN | HEART RATE: 97 BPM | WEIGHT: 161.63 LBS | DIASTOLIC BLOOD PRESSURE: 70 MMHG | TEMPERATURE: 99 F

## 2024-06-21 DIAGNOSIS — Z00.00 ROUTINE GENERAL MEDICAL EXAMINATION AT A HEALTH CARE FACILITY: Primary | ICD-10-CM

## 2024-06-21 DIAGNOSIS — R30.0 DYSURIA: ICD-10-CM

## 2024-06-21 DIAGNOSIS — Z78.9 STATIN INTOLERANCE: ICD-10-CM

## 2024-06-21 DIAGNOSIS — M62.838 NIGHT MUSCLE SPASMS: ICD-10-CM

## 2024-06-21 DIAGNOSIS — Z13.1 DIABETES MELLITUS SCREENING: ICD-10-CM

## 2024-06-21 LAB
BACTERIA #/AREA URNS AUTO: ABNORMAL /HPF
BILIRUB UR QL STRIP: NEGATIVE
CLARITY UR REFRACT.AUTO: CLEAR
COLOR UR AUTO: YELLOW
GLUCOSE UR QL STRIP: NEGATIVE
HGB UR QL STRIP: ABNORMAL
KETONES UR QL STRIP: ABNORMAL
LEUKOCYTE ESTERASE UR QL STRIP: ABNORMAL
MICROSCOPIC COMMENT: ABNORMAL
NITRITE UR QL STRIP: POSITIVE
PH UR STRIP: 7 [PH] (ref 5–8)
PROT UR QL STRIP: NEGATIVE
RBC #/AREA URNS AUTO: 16 /HPF (ref 0–4)
SP GR UR STRIP: 1.02 (ref 1–1.03)
SQUAMOUS #/AREA URNS AUTO: 3 /HPF
URN SPEC COLLECT METH UR: ABNORMAL
WBC #/AREA URNS AUTO: 7 /HPF (ref 0–5)
YEAST UR QL AUTO: ABNORMAL

## 2024-06-21 PROCEDURE — 99396 PREV VISIT EST AGE 40-64: CPT | Mod: S$GLB,,, | Performed by: FAMILY MEDICINE

## 2024-06-21 PROCEDURE — 1159F MED LIST DOCD IN RCRD: CPT | Mod: CPTII,S$GLB,, | Performed by: FAMILY MEDICINE

## 2024-06-21 PROCEDURE — 99999 PR PBB SHADOW E&M-EST. PATIENT-LVL III: CPT | Mod: PBBFAC,,, | Performed by: FAMILY MEDICINE

## 2024-06-21 PROCEDURE — 1160F RVW MEDS BY RX/DR IN RCRD: CPT | Mod: CPTII,S$GLB,, | Performed by: FAMILY MEDICINE

## 2024-06-21 PROCEDURE — 3008F BODY MASS INDEX DOCD: CPT | Mod: CPTII,S$GLB,, | Performed by: FAMILY MEDICINE

## 2024-06-21 PROCEDURE — 3078F DIAST BP <80 MM HG: CPT | Mod: CPTII,S$GLB,, | Performed by: FAMILY MEDICINE

## 2024-06-21 PROCEDURE — 3074F SYST BP LT 130 MM HG: CPT | Mod: CPTII,S$GLB,, | Performed by: FAMILY MEDICINE

## 2024-06-21 PROCEDURE — 81001 URINALYSIS AUTO W/SCOPE: CPT | Performed by: FAMILY MEDICINE

## 2024-06-21 RX ORDER — TIZANIDINE HYDROCHLORIDE 4 MG/1
1 CAPSULE, GELATIN COATED ORAL DAILY PRN
Qty: 90 CAPSULE | Refills: 0 | Status: SHIPPED | OUTPATIENT
Start: 2024-06-21

## 2024-06-21 NOTE — PROGRESS NOTES
Subjective     Patient ID: Jhoana Sierra is a 59 y.o. female.    Chief Complaint: Establish Care    History of Present Illness    Jhoana presents today for a regular follow-up exam.    She reports a recent UTI treated with antibiotics but still experiences residual burning sensation when holding urine. The pain returns despite completing antibiotics. She has not had a urinalysis in over 30 years, since being pregnant.    Fasting lab work is scheduled for July 29th with Dr. Stevens. She is okay getting all tests on that date since recently seeing cardiologist Dr. Turcios who orders some of the same tests. A mammogram is scheduled with [facility]. She is due for a colonoscopy at the end of the year. She had a baseline DEXA scan and gynecologist will follow up on bone density testing given family history of osteoporosis. She just had an eye exam this week without signs of cataracts.    History of colon cancer, Dr. Stevens was the surgeon. Maternal grandmother, mother, and older sister all have osteoporosis or early osteoporosis. She denies iron deficiency or extremity swelling.    She takes tizanidine rarely as needed and requests a 3-month supply as her  is retiring and insurance will be changing. She takes 81mg aspirin daily. She receives Repatha injections for high cholesterol as statins caused her liver enzymes to triple.    She reports probable hearing loss in her left ear, describing it as always feeling like there is a 'block' in that ear. She denies other hearing concerns.    ROS:  General: -fever, -chills, -fatigue, -weight gain, -weight loss  Eyes: -vision changes, -redness, -discharge, -eye pain  ENT: -ear pain, -nasal congestion, -sore throat, +hearing loss  Cardiovascular: -chest pain, -palpitations, -lower extremity edema  Respiratory: -cough, -shortness of breath  Gastrointestinal: -abdominal pain, -nausea, -vomiting, -diarrhea, -constipation, -blood in stool  Genitourinary: -dysuria,  -hematuria, -frequency, +painful urination  Musculoskeletal: -joint pain, -muscle pain  Skin: -rash, -lesion  Neurological: -headache, -dizziness, -numbness, -tingling  Psychiatric: -anxiety, -depression, -sleep difficulty            Objective     Physical Exam  Vitals and nursing note reviewed.   Constitutional:       Appearance: Normal appearance. She is normal weight.   HENT:      Head: Normocephalic and atraumatic.      Right Ear: Tympanic membrane, ear canal and external ear normal.      Left Ear: Tympanic membrane, ear canal and external ear normal.      Nose: Nose normal.      Mouth/Throat:      Mouth: Mucous membranes are moist.      Pharynx: Oropharynx is clear.   Eyes:      Extraocular Movements: Extraocular movements intact.      Conjunctiva/sclera: Conjunctivae normal.   Cardiovascular:      Rate and Rhythm: Normal rate and regular rhythm.      Pulses: Normal pulses.      Heart sounds: Normal heart sounds.   Pulmonary:      Effort: Pulmonary effort is normal.      Breath sounds: Normal breath sounds.   Abdominal:      General: Abdomen is flat. Bowel sounds are normal.      Palpations: Abdomen is soft.   Musculoskeletal:         General: Normal range of motion.      Cervical back: Normal range of motion and neck supple.      Right lower leg: No edema.      Left lower leg: No edema.   Lymphadenopathy:      Cervical: No cervical adenopathy.   Skin:     General: Skin is warm and dry.   Neurological:      General: No focal deficit present.      Mental Status: She is alert and oriented to person, place, and time.   Psychiatric:         Mood and Affect: Mood normal.         Behavior: Behavior normal.              Assessment and Plan     1. Routine general medical examination at a health care facility  Comments:  reviewed age appropriate screenings and immunizations  Orders:  -     T4, Free; Future; Expected date: 07/30/2024  -     TSH; Future; Expected date: 07/30/2024  -     Lipid Panel; Future; Expected date:  07/30/2024  -     Comprehensive Metabolic Panel; Future; Expected date: 07/30/2024  -     CBC Auto Differential; Future; Expected date: 07/30/2024    2. Diabetes mellitus screening  -     Hemoglobin A1C; Future; Expected date: 07/30/2024    3. Dysuria  -     Urinalysis, Reflex to Urine Culture Urine, Clean Catch    4. Statin intolerance    5. Night muscle spasms  -     tiZANidine 4 mg Cap; Take 1 capsule by mouth daily as needed (pain).  Dispense: 90 capsule; Refill: 0    Other orders  -     Urinalysis Microscopic        Assessment & Plan    MUSCLE SPASMS:   Continued tizanidine and provided 3 month refill, patient takes rarely as needed.  CARDIOVASCULAR DISEASE:   Continued aspirin 81 mg daily for antiplatelet therapy as recommended by cardiology.  HYPERLIPIDEMIA:   Continued Repatha injections for cholesterol management as statins not tolerated due to liver enzyme elevation.  SUSPECTED URINARY TRACT INFECTION:   Ordered urinalysis with culture to evaluate for potential persistent UTI and antibiotic resistance.  ROUTINE HEALTH MAINTENANCE:   Ordered routine health maintenance labs to be completed with upcoming scheduled fasting labs on July 29th for Dr. Stevens.   Follow up on July 29th as scheduled for fasting lab appointment with Dr. Stevens.  OSTEOPOROSIS RISK:   Jhoana to follow up with gynecologist next month and discuss need for DEXA scan given family history of osteoporosis.              Follow up in about 1 year (around 6/21/2025) for Annual Exam.    This note was generated with the assistance of ambient listening technology. Verbal consent was obtained by the patient and accompanying visitor(s) for the recording of patient appointment to facilitate this note. I attest to having reviewed and edited the generated note for accuracy, though some syntax or spelling errors may persist. Please contact the author of this note for any clarification.

## 2024-06-24 ENCOUNTER — LAB VISIT (OUTPATIENT)
Dept: LAB | Facility: HOSPITAL | Age: 59
End: 2024-06-24
Attending: FAMILY MEDICINE
Payer: COMMERCIAL

## 2024-06-24 DIAGNOSIS — Z00.00 ROUTINE GENERAL MEDICAL EXAMINATION AT A HEALTH CARE FACILITY: ICD-10-CM

## 2024-06-24 DIAGNOSIS — Z13.1 DIABETES MELLITUS SCREENING: ICD-10-CM

## 2024-06-24 LAB
ALBUMIN SERPL BCP-MCNC: 4.1 G/DL (ref 3.5–5.2)
ALP SERPL-CCNC: 74 U/L (ref 55–135)
ALT SERPL W/O P-5'-P-CCNC: 19 U/L (ref 10–44)
ANION GAP SERPL CALC-SCNC: 11 MMOL/L (ref 8–16)
AST SERPL-CCNC: 18 U/L (ref 10–40)
BASOPHILS # BLD AUTO: 0.03 K/UL (ref 0–0.2)
BASOPHILS NFR BLD: 0.7 % (ref 0–1.9)
BILIRUB SERPL-MCNC: 0.5 MG/DL (ref 0.1–1)
BUN SERPL-MCNC: 18 MG/DL (ref 6–20)
CALCIUM SERPL-MCNC: 9.8 MG/DL (ref 8.7–10.5)
CHLORIDE SERPL-SCNC: 105 MMOL/L (ref 95–110)
CHOLEST SERPL-MCNC: 141 MG/DL (ref 120–199)
CHOLEST/HDLC SERPL: 2.3 {RATIO} (ref 2–5)
CO2 SERPL-SCNC: 24 MMOL/L (ref 23–29)
CREAT SERPL-MCNC: 0.8 MG/DL (ref 0.5–1.4)
DIFFERENTIAL METHOD BLD: ABNORMAL
EOSINOPHIL # BLD AUTO: 0.1 K/UL (ref 0–0.5)
EOSINOPHIL NFR BLD: 1.6 % (ref 0–8)
ERYTHROCYTE [DISTWIDTH] IN BLOOD BY AUTOMATED COUNT: 12.5 % (ref 11.5–14.5)
EST. GFR  (NO RACE VARIABLE): >60 ML/MIN/1.73 M^2
ESTIMATED AVG GLUCOSE: 108 MG/DL (ref 68–131)
GLUCOSE SERPL-MCNC: 88 MG/DL (ref 70–110)
HBA1C MFR BLD: 5.4 % (ref 4–5.6)
HCT VFR BLD AUTO: 41.3 % (ref 37–48.5)
HDLC SERPL-MCNC: 61 MG/DL (ref 40–75)
HDLC SERPL: 43.3 % (ref 20–50)
HGB BLD-MCNC: 12.9 G/DL (ref 12–16)
IMM GRANULOCYTES # BLD AUTO: 0.01 K/UL (ref 0–0.04)
IMM GRANULOCYTES NFR BLD AUTO: 0.2 % (ref 0–0.5)
LDLC SERPL CALC-MCNC: 66 MG/DL (ref 63–159)
LYMPHOCYTES # BLD AUTO: 1.7 K/UL (ref 1–4.8)
LYMPHOCYTES NFR BLD: 40.7 % (ref 18–48)
MCH RBC QN AUTO: 30.4 PG (ref 27–31)
MCHC RBC AUTO-ENTMCNC: 31.2 G/DL (ref 32–36)
MCV RBC AUTO: 97 FL (ref 82–98)
MONOCYTES # BLD AUTO: 0.3 K/UL (ref 0.3–1)
MONOCYTES NFR BLD: 8 % (ref 4–15)
NEUTROPHILS # BLD AUTO: 2.1 K/UL (ref 1.8–7.7)
NEUTROPHILS NFR BLD: 48.8 % (ref 38–73)
NONHDLC SERPL-MCNC: 80 MG/DL
NRBC BLD-RTO: 0 /100 WBC
PLATELET # BLD AUTO: 230 K/UL (ref 150–450)
PMV BLD AUTO: 10.3 FL (ref 9.2–12.9)
POTASSIUM SERPL-SCNC: 4.4 MMOL/L (ref 3.5–5.1)
PROT SERPL-MCNC: 7.1 G/DL (ref 6–8.4)
RBC # BLD AUTO: 4.25 M/UL (ref 4–5.4)
SODIUM SERPL-SCNC: 140 MMOL/L (ref 136–145)
T4 FREE SERPL-MCNC: 0.93 NG/DL (ref 0.71–1.51)
TRIGL SERPL-MCNC: 70 MG/DL (ref 30–150)
TSH SERPL DL<=0.005 MIU/L-ACNC: 2.16 UIU/ML (ref 0.4–4)
WBC # BLD AUTO: 4.25 K/UL (ref 3.9–12.7)

## 2024-06-24 PROCEDURE — 83036 HEMOGLOBIN GLYCOSYLATED A1C: CPT | Performed by: FAMILY MEDICINE

## 2024-06-24 PROCEDURE — 84439 ASSAY OF FREE THYROXINE: CPT | Performed by: FAMILY MEDICINE

## 2024-06-24 PROCEDURE — 80053 COMPREHEN METABOLIC PANEL: CPT | Performed by: FAMILY MEDICINE

## 2024-06-24 PROCEDURE — 84443 ASSAY THYROID STIM HORMONE: CPT | Performed by: FAMILY MEDICINE

## 2024-06-24 PROCEDURE — 85025 COMPLETE CBC W/AUTO DIFF WBC: CPT | Performed by: FAMILY MEDICINE

## 2024-06-24 PROCEDURE — 80061 LIPID PANEL: CPT | Performed by: FAMILY MEDICINE

## 2024-06-24 PROCEDURE — 36415 COLL VENOUS BLD VENIPUNCTURE: CPT | Mod: PO | Performed by: FAMILY MEDICINE

## 2024-06-24 RX ORDER — NITROFURANTOIN 25; 75 MG/1; MG/1
100 CAPSULE ORAL 2 TIMES DAILY
Qty: 14 CAPSULE | Refills: 0 | Status: SHIPPED | OUTPATIENT
Start: 2024-06-24

## 2024-06-26 DIAGNOSIS — I21.02 ST ELEVATION MYOCARDIAL INFARCTION INVOLVING LEFT ANTERIOR DESCENDING (LAD) CORONARY ARTERY: ICD-10-CM

## 2024-06-26 RX ORDER — METOPROLOL SUCCINATE 25 MG/1
TABLET, EXTENDED RELEASE ORAL
Qty: 90 TABLET | Refills: 3 | Status: SHIPPED | OUTPATIENT
Start: 2024-06-26

## 2024-06-27 ENCOUNTER — PATIENT MESSAGE (OUTPATIENT)
Dept: INTERNAL MEDICINE | Facility: CLINIC | Age: 59
End: 2024-06-27
Payer: COMMERCIAL

## 2024-07-05 DIAGNOSIS — Z88.8 ALLERGY TO STATIN MEDICATION: ICD-10-CM

## 2024-07-05 DIAGNOSIS — I21.02 ST ELEVATION MYOCARDIAL INFARCTION INVOLVING LEFT ANTERIOR DESCENDING (LAD) CORONARY ARTERY: ICD-10-CM

## 2024-07-07 RX ORDER — EVOLOCUMAB 140 MG/ML
INJECTION, SOLUTION SUBCUTANEOUS
Qty: 6 ML | Refills: 0 | Status: SHIPPED | OUTPATIENT
Start: 2024-07-07

## 2024-08-21 ENCOUNTER — PATIENT MESSAGE (OUTPATIENT)
Dept: INTERNAL MEDICINE | Facility: CLINIC | Age: 59
End: 2024-08-21
Payer: COMMERCIAL

## 2024-08-21 ENCOUNTER — OFFICE VISIT (OUTPATIENT)
Dept: CARDIOLOGY | Facility: CLINIC | Age: 59
End: 2024-08-21
Payer: COMMERCIAL

## 2024-08-21 ENCOUNTER — LAB VISIT (OUTPATIENT)
Dept: LAB | Facility: HOSPITAL | Age: 59
End: 2024-08-21
Attending: FAMILY MEDICINE
Payer: COMMERCIAL

## 2024-08-21 VITALS
WEIGHT: 158.5 LBS | DIASTOLIC BLOOD PRESSURE: 80 MMHG | HEART RATE: 69 BPM | BODY MASS INDEX: 27.21 KG/M2 | SYSTOLIC BLOOD PRESSURE: 130 MMHG | OXYGEN SATURATION: 97 %

## 2024-08-21 DIAGNOSIS — R19.5 POSITIVE FIT (FECAL IMMUNOCHEMICAL TEST): ICD-10-CM

## 2024-08-21 DIAGNOSIS — Z88.8 ALLERGY TO STATIN MEDICATION: ICD-10-CM

## 2024-08-21 DIAGNOSIS — K52.832 LYMPHOCYTIC COLITIS: ICD-10-CM

## 2024-08-21 DIAGNOSIS — R94.31 ABNORMAL EKG: ICD-10-CM

## 2024-08-21 DIAGNOSIS — N39.0 RECURRENT UTI: Primary | ICD-10-CM

## 2024-08-21 DIAGNOSIS — C18.9 ADENOCARCINOMA OF COLON: ICD-10-CM

## 2024-08-21 DIAGNOSIS — I77.3 FIBROMUSCULAR DYSPLASIA: ICD-10-CM

## 2024-08-21 DIAGNOSIS — I21.02 ST ELEVATION MYOCARDIAL INFARCTION INVOLVING LEFT ANTERIOR DESCENDING (LAD) CORONARY ARTERY: ICD-10-CM

## 2024-08-21 DIAGNOSIS — I25.42 SPONTANEOUS DISSECTION OF CORONARY ARTERY: Primary | ICD-10-CM

## 2024-08-21 DIAGNOSIS — N39.0 RECURRENT UTI: ICD-10-CM

## 2024-08-21 DIAGNOSIS — R79.89 ELEVATED LFTS: ICD-10-CM

## 2024-08-21 DIAGNOSIS — C18.9 COLON ADENOCARCINOMA: ICD-10-CM

## 2024-08-21 PROCEDURE — 3008F BODY MASS INDEX DOCD: CPT | Mod: CPTII,S$GLB,, | Performed by: INTERNAL MEDICINE

## 2024-08-21 PROCEDURE — 99999 PR PBB SHADOW E&M-EST. PATIENT-LVL III: CPT | Mod: PBBFAC,,, | Performed by: INTERNAL MEDICINE

## 2024-08-21 PROCEDURE — 87186 SC STD MICRODIL/AGAR DIL: CPT | Performed by: FAMILY MEDICINE

## 2024-08-21 PROCEDURE — 3075F SYST BP GE 130 - 139MM HG: CPT | Mod: CPTII,S$GLB,, | Performed by: INTERNAL MEDICINE

## 2024-08-21 PROCEDURE — 81001 URINALYSIS AUTO W/SCOPE: CPT | Performed by: FAMILY MEDICINE

## 2024-08-21 PROCEDURE — 1160F RVW MEDS BY RX/DR IN RCRD: CPT | Mod: CPTII,S$GLB,, | Performed by: INTERNAL MEDICINE

## 2024-08-21 PROCEDURE — 1159F MED LIST DOCD IN RCRD: CPT | Mod: CPTII,S$GLB,, | Performed by: INTERNAL MEDICINE

## 2024-08-21 PROCEDURE — 99213 OFFICE O/P EST LOW 20 MIN: CPT | Mod: S$GLB,,, | Performed by: INTERNAL MEDICINE

## 2024-08-21 PROCEDURE — 87088 URINE BACTERIA CULTURE: CPT | Performed by: FAMILY MEDICINE

## 2024-08-21 PROCEDURE — 3079F DIAST BP 80-89 MM HG: CPT | Mod: CPTII,S$GLB,, | Performed by: INTERNAL MEDICINE

## 2024-08-21 PROCEDURE — 87086 URINE CULTURE/COLONY COUNT: CPT | Performed by: FAMILY MEDICINE

## 2024-08-21 PROCEDURE — 3044F HG A1C LEVEL LT 7.0%: CPT | Mod: CPTII,S$GLB,, | Performed by: INTERNAL MEDICINE

## 2024-08-21 PROCEDURE — 87184 SC STD DISK METHOD PER PLATE: CPT | Performed by: FAMILY MEDICINE

## 2024-08-21 NOTE — PROGRESS NOTES
Subjective:   Patient ID:  Jhoana Sierra is a 59 y.o. female who presents for follow up of Follow-up      HPI  2/14/2022 DR COOK  58 yo female, came in for post STEMI/ coronary dissection f/u  PMH h/o Colon cancer emoval parts in  no chemo and XRT  and in remission, fibromyalgia  01/24/2022 admitted to [facility] for STEMI. Emergent cath showed spontaneous mild to distal LAD dissection, normal EF and filling pressure. ascending TAA 4 cm.  Chest CTA showed no PE and TAA  Brain CT negative.   ABD CTA showed slight beaded appurtenance of shelbi l A. Typical for fibromuscular dysplasia  Vascularis workup negative for NIRANJAN CRP KHANG and ANCA.   D/c with ASA lipitor and metoprolol  Now no chest pain.      7/1/2022   HAS OCCASIONAL SHARP CHEST PAIN DIFFERENT THAN HER ORIGINAL EVENT  WITH CORONARY DISSECTION OF DISTAL LAD. SHE ASCENCIO SNO EXERTIONAL SYMPTOMS BACK TO YARD WORK AND BEING PHYSICALLY ACTIVE. HAS NO CHF SYMPTOMS HAS NO PALPITATION. SHE NEEDS SURGERY ON HER OVARIAN CYST. SHE IS HERE FOR CARDIAC CLEARANCE. NO H/O HTN OR ANY FAMILY H/O SCAT. HAD FIBROMUSCULAR DYSPLASIA ON RENAL ARTERIES.HAD INCREASED LFT NECESSITATING CESSATION OF STATIN THERAPY.      1/6/2023   Asymptomatic doing well clinically has not been exercising regularily tries to be compliant with diet takes meds regularily family stress. No angina .had hysterectomy in September no cardiac issues.  7/27/2023   Has been doing well has not had benefit from the cholest off she is intolerant to statins. She had stemi from spontaneous dissection she needs lipid therapy. I think [id number] is a good option for her. Asymptomatic cardiac wsie has not exercised due to foot problem. Has been very compliant with diet.      2/14/2024   Gets occsional leg cramps from repatha has other side effects stomach complaints  get nausea at times. Get shakes nervousness.     8/21/2024   Has lost weight is in [email or web address] HAS BEEN COMPLIANT WITH DIET AND EXERCISE   ASYMPTOMATIC CARDIOVASCULAR WISE.   Past Medical History:   Diagnosis Date    Adenocarcinoma of colon     Colon cancer 2020    Heart attack 01/22/2022    PONV (postoperative nausea and vomiting)        Past Surgical History:   Procedure Laterality Date    COLONOSCOPY N/A 08/25/2020    Procedure: COLONOSCOPY;  Surgeon: Rianna Dillon MD;  Location: [facility] ENDO;  Service: Endoscopy;  Laterality: N/A;    COLONOSCOPY N/A 01/07/2022    Procedure: COLONOSCOPY;  Surgeon: Ziggy Hannah MD;  Location: [facility] ENDO;  Service: General;  Laterality: N/A;    DIAGNOSTIC LAPAROSCOPY N/A 09/07/2022    Procedure: LAPAROSCOPY, DIAGNOSTIC;  Surgeon: Ziggy Hannah MD;  Location: [facility] OR;  Service: General;  Laterality: N/A;    FLEXIBLE SIGMOIDOSCOPY N/A 10/08/2020    Procedure: SIGMOIDOSCOPY, FLEXIBLE;  Surgeon: Ziggy Hannah MD;  Location: [facility] OR;  Service: General;  Laterality: N/A;    HYSTERECTOMY      INJECTION OF ANESTHETIC AGENT INTO TISSUE PLANE DEFINED BY TRANSVERSUS ABDOMINIS MUSCLE N/A 10/08/2020    Procedure: BLOCK, TRANSVERSUS ABDOMINIS PLANE;  Surgeon: Ziggy Hannah MD;  Location: [facility] OR;  Service: General;  Laterality: N/A;    LAPAROSCOPIC LYSIS OF ADHESIONS  09/07/2022    Procedure: LYSIS, ADHESIONS, LAPAROSCOPIC;  Surgeon: Ziggy Hannah MD;  Location: [facility] OR;  Service: General;;    LAPAROSCOPIC SIGMOIDECTOMY N/A 10/08/2020    Procedure: COLECTOMY, SIGMOID, LAPAROSCOPIC;  Surgeon: Ziggy Hannah MD;  Location: [facility] OR;  Service: General;  Laterality: N/A;  lower anterior resection  Proctosigmoidectomy    LAPAROTOMY N/A 10/08/2020    Procedure: LAPAROTOMY;  Surgeon: Ziggy Hannah MD;  Location: [facility] OR;  Service: General;  Laterality: N/A;    OVARIAN CYST REMOVAL      WISDOM TOOTH EXTRACTION      XI ROBOTIC HYSTERECTOMY, WITH SALPINGO-OOPHORECTOMY Bilateral 09/07/2022    Procedure: XI ROBOTIC HYSTERECTOMY,WITH SALPINGO-OOPHORECTOMY;  Surgeon: Bela Noel MD;  Location: [facility] OR;   Service: OB/GYN;  Laterality: Bilateral;       Social History     Tobacco Use    Smoking status: Never     Passive exposure: Never    Smokeless tobacco: Never   Substance Use Topics    Alcohol use: Yes     Alcohol/week: 2.0 standard drinks of alcohol     Types: 1 Glasses of wine, 1 Shots of liquor per week     Comment: Occ use; HOLD 72HRS PRIOR TO SURGERY    Drug use: No       Family History   Problem Relation Name Age of Onset    Fibromyalgia Sister      Diabetes Neg Hx      Heart disease Neg Hx         Current Outpatient Medications   Medication Sig    aspirin 81 mg Cap 81 MG  .    metoprolol succinate (TOPROL-XL) 25 MG 24 hr tablet Take 1 tablet by mouth once daily    REPATHA SURECLICK 140 mg/mL PnIj INJECT 1 ML INTO SKIN EVERY 14 DAYS    tiZANidine 4 mg Cap Take 1 capsule by mouth daily as needed (pain).     No current facility-administered medications for this visit.     Current Outpatient Medications on File Prior to Visit   Medication Sig    aspirin 81 mg Cap 81 MG  .    metoprolol succinate (TOPROL-XL) 25 MG 24 hr tablet Take 1 tablet by mouth once daily    REPATHA SURECLICK 140 mg/mL PnIj INJECT 1 ML INTO SKIN EVERY 14 DAYS    tiZANidine 4 mg Cap Take 1 capsule by mouth daily as needed (pain).     No current facility-administered medications on file prior to visit.     Review of patient's allergies indicates:  No Known Allergies   Review of Systems   Constitutional: Positive for weight loss. Negative for diaphoresis and malaise/fatigue.   HENT:  Negative for hoarse voice.    Eyes:  Negative for double vision and visual disturbance.   Cardiovascular:  Negative for chest pain, claudication, cyanosis, dyspnea on exertion, irregular heartbeat, leg swelling, near-syncope, orthopnea, palpitations, paroxysmal nocturnal dyspnea and syncope.   Respiratory:  Negative for cough, hemoptysis, shortness of breath and snoring.    Hematologic/Lymphatic: Negative for bleeding problem. Does not bruise/bleed easily.   Skin:   Negative for color change and poor wound healing.   Musculoskeletal:  Negative for muscle cramps, muscle weakness and myalgias.   Gastrointestinal:  Negative for bloating, abdominal pain, change in bowel habit, diarrhea, heartburn, hematemesis, hematochezia, melena and nausea.   Neurological:  Negative for excessive daytime sleepiness, dizziness, headaches, light-headedness, loss of balance, numbness and weakness.   Psychiatric/Behavioral:  Negative for memory loss. The patient does not have insomnia.    Allergic/Immunologic: Negative for hives.       Objective:   Physical Exam  Constitutional:       General: She is not in acute distress.     Appearance: Normal appearance. She is well-developed. She is not ill-appearing.   HENT:      Head: Normocephalic and atraumatic.   Eyes:      General: No scleral icterus.     Pupils: Pupils are equal, round, and reactive to light.   Neck:      Thyroid: No thyromegaly.      Vascular: Normal carotid pulses. No carotid bruit, hepatojugular reflux or JVD.      Trachea: No tracheal deviation.   Cardiovascular:      Rate and Rhythm: Normal rate and regular rhythm.      Pulses: Normal pulses.      Heart sounds: Normal heart sounds. No murmur heard.     No friction rub. No gallop.   Pulmonary:      Effort: Pulmonary effort is normal. No respiratory distress.      Breath sounds: Normal breath sounds. No wheezing, rhonchi or rales.   Chest:      Chest wall: No tenderness.   Abdominal:      General: Bowel sounds are normal. There is no abdominal bruit.      Palpations: Abdomen is soft. There is no hepatomegaly or pulsatile mass.      Tenderness: There is no abdominal tenderness.   Musculoskeletal:      Right shoulder: No deformity.      Cervical back: Normal range of motion and neck supple.      Right lower leg: No edema.      Left lower leg: No edema.   Skin:     General: Skin is warm and dry.      Findings: No erythema or rash.      Nails: There is no clubbing.   Neurological:       General: No focal deficit present.      Mental Status: She is alert and oriented to person, place, and time.      Cranial Nerves: No cranial nerve deficit.      Coordination: Coordination normal.   Psychiatric:         Mood and Affect: Mood normal.         Speech: Speech normal.         Behavior: Behavior normal.       Vitals:    08/21/24 1021 08/21/24 1022   BP: 132/80 130/80   BP Location: Right arm Left arm   Patient Position: Sitting Sitting   BP Method: Large (Manual) Large (Manual)   Pulse: 69    SpO2: 97%    Weight: 71.9 kg (158 lb 8.2 oz)      Lab Results   Component Value Date    CHOL 141 06/24/2024    CHOL 150 01/18/2024    CHOL 208 (H) 07/07/2023      Body mass index is 27.21 kg/m².   Lab Results   Component Value Date    HGBA1C 5.4 06/24/2024      BMP  Lab Results   Component Value Date     06/24/2024    K 4.4 06/24/2024     06/24/2024    CO2 24 06/24/2024    BUN 18 06/24/2024    CREATININE 0.8 06/24/2024    CALCIUM 9.8 06/24/2024    ANIONGAP 11 06/24/2024    EGFRNORACEVR >60.0 06/24/2024      Lab Results   Component Value Date    HDL 61 06/24/2024    HDL 70 01/18/2024    HDL 63 07/07/2023     Lab Results   Component Value Date    LDLCALC 66.0 06/24/2024    LDLCALC 61.0 (L) 01/18/2024    LDLCALC 127.4 07/07/2023     Lab Results   Component Value Date    TRIG 70 06/24/2024    TRIG 95 01/18/2024    TRIG 88 07/07/2023     Lab Results   Component Value Date    CHOLHDL 43.3 06/24/2024    CHOLHDL 46.7 01/18/2024    CHOLHDL 30.3 07/07/2023       Chemistry        Component Value Date/Time     06/24/2024 0722    K 4.4 06/24/2024 0722     06/24/2024 0722    CO2 24 06/24/2024 0722    BUN 18 06/24/2024 0722    CREATININE 0.8 06/24/2024 0722    GLU 88 06/24/2024 0722        Component Value Date/Time    CALCIUM 9.8 06/24/2024 0722    ALKPHOS 74 06/24/2024 0722    AST 18 06/24/2024 0722    ALT 19 06/24/2024 0722    BILITOT 0.5 06/24/2024 0722    ESTGFRAFRICA >60.0 07/25/2022 1037    EGFRNONAA  ">60.0 07/25/2022 1037          Lab Results   Component Value Date    TSH 2.159 06/24/2024     No results found for: "INR", "PROTIME"  Lab Results   Component Value Date    WBC 4.25 06/24/2024    HGB 12.9 06/24/2024    HCT 41.3 06/24/2024    MCV 97 06/24/2024     06/24/2024     BMP  Sodium   Date Value Ref Range Status   06/24/2024 140 136 - 145 mmol/L Final     Potassium   Date Value Ref Range Status   06/24/2024 4.4 3.5 - 5.1 mmol/L Final     Chloride   Date Value Ref Range Status   06/24/2024 105 95 - 110 mmol/L Final     CO2   Date Value Ref Range Status   06/24/2024 24 23 - 29 mmol/L Final     BUN   Date Value Ref Range Status   06/24/2024 18 6 - 20 mg/dL Final     Creatinine   Date Value Ref Range Status   06/24/2024 0.8 0.5 - 1.4 mg/dL Final     Calcium   Date Value Ref Range Status   06/24/2024 9.8 8.7 - 10.5 mg/dL Final     Anion Gap   Date Value Ref Range Status   06/24/2024 11 8 - 16 mmol/L Final     eGFR if    Date Value Ref Range Status   07/25/2022 >60.0 >60 mL/min/1.73 m^2 Final     eGFR if non    Date Value Ref Range Status   07/25/2022 >60.0 >60 mL/min/1.73 m^2 Final     Comment:     Calculation used to obtain the estimated glomerular filtration  rate (eGFR) is the CKD-EPI equation.        CrCl cannot be calculated (Patient's most recent lab result is older than the maximum 7 days allowed.).    Assessment:     1. Spontaneous dissection of coronary artery    2. Positive FIT (fecal immunochemical test)    3. Adenocarcinoma of colon    4. Colon adenocarcinoma    5. Lymphocytic colitis    6. ST elevation myocardial infarction involving left anterior descending (LAD) coronary artery    7. Fibromuscular dysplasia    8. Abnormal EKG    9. Elevated LFTs    10. Allergy to statin medication      COLON CANCER IN REMISSION IN A STUDY AT [place] CONTINUE SURVEILLANCE   HLP ON [id number] ON [facility] CONTINUE THE SAME.   SCAD RESOLVED ASYMPTOMATIC DAV XERCISE TOLERANCE " CONTINUE THE SAME.   DISCUSSED RF MODIFICATION EXERCISE WEIGHT LOSS.  Plan:   Continue current therapy  Cardiac low salt diet.  Risk factor modification and excercise program./WEIGHT LOSS  F/u in 6 months with lipid cmp

## 2024-08-22 ENCOUNTER — PATIENT MESSAGE (OUTPATIENT)
Dept: INTERNAL MEDICINE | Facility: CLINIC | Age: 59
End: 2024-08-22
Payer: COMMERCIAL

## 2024-08-22 LAB
AMORPH CRY UR QL COMP ASSIST: ABNORMAL
BACTERIA #/AREA URNS AUTO: ABNORMAL /HPF
BILIRUB UR QL STRIP: NEGATIVE
CLARITY UR REFRACT.AUTO: CLEAR
COLOR UR AUTO: YELLOW
GLUCOSE UR QL STRIP: NEGATIVE
HGB UR QL STRIP: ABNORMAL
KETONES UR QL STRIP: ABNORMAL
LEUKOCYTE ESTERASE UR QL STRIP: ABNORMAL
MICROSCOPIC COMMENT: ABNORMAL
NITRITE UR QL STRIP: POSITIVE
PH UR STRIP: 7 [PH] (ref 5–8)
PROT UR QL STRIP: NEGATIVE
RBC #/AREA URNS AUTO: 5 /HPF (ref 0–4)
SP GR UR STRIP: 1.02 (ref 1–1.03)
SQUAMOUS #/AREA URNS AUTO: 1 /HPF
URN SPEC COLLECT METH UR: ABNORMAL
WBC #/AREA URNS AUTO: 1 /HPF (ref 0–5)

## 2024-08-23 RX ORDER — SULFAMETHOXAZOLE AND TRIMETHOPRIM 800; 160 MG/1; MG/1
1 TABLET ORAL 2 TIMES DAILY
Qty: 14 TABLET | Refills: 0 | Status: SHIPPED | OUTPATIENT
Start: 2024-08-23

## 2024-08-25 LAB — BACTERIA UR CULT: ABNORMAL

## 2024-10-14 ENCOUNTER — OFFICE VISIT (OUTPATIENT)
Dept: DERMATOLOGY | Facility: CLINIC | Age: 59
End: 2024-10-14
Payer: COMMERCIAL

## 2024-10-14 DIAGNOSIS — D22.9 MULTIPLE NEVI: Primary | ICD-10-CM

## 2024-10-14 DIAGNOSIS — L81.4 LENTIGINES: ICD-10-CM

## 2024-10-14 DIAGNOSIS — L82.1 SEBORRHEIC KERATOSIS: ICD-10-CM

## 2024-10-14 PROCEDURE — 99203 OFFICE O/P NEW LOW 30 MIN: CPT | Mod: S$GLB,,, | Performed by: DERMATOLOGY

## 2024-10-14 PROCEDURE — 1159F MED LIST DOCD IN RCRD: CPT | Mod: CPTII,S$GLB,, | Performed by: DERMATOLOGY

## 2024-10-14 PROCEDURE — 3044F HG A1C LEVEL LT 7.0%: CPT | Mod: CPTII,S$GLB,, | Performed by: DERMATOLOGY

## 2024-10-14 PROCEDURE — 99999 PR PBB SHADOW E&M-EST. PATIENT-LVL III: CPT | Mod: PBBFAC,,, | Performed by: DERMATOLOGY

## 2024-10-14 PROCEDURE — 1160F RVW MEDS BY RX/DR IN RCRD: CPT | Mod: CPTII,S$GLB,, | Performed by: DERMATOLOGY

## 2024-10-14 NOTE — PATIENT INSTRUCTIONS
Preventing Skin Cancer  Relaxing in the sun may feel good. But it isnt good for your skin. In fact, being exposed to the suns harmful rays is a major cause of skin cancer. This is a serious disease that can be life-threatening. People of all ages and backgrounds are at risk. But in most cases, skin cancer can be prevented.    Your Role in Prevention  You can act today to help prevent skin cancer. Start by avoiding the suns UV (ultraviolet) rays. And dont use tanning beds, which are no safer than the sun. Taking these steps can help keep you from getting skin cancer. It can also help prevent wrinkles and other sun-induced aging effects. Make sure your children also follow these safeguards. Now is the time to start taking preventive steps against skin cancer.  When You Are Outdoors  Protect your skin when you go outdoors during the day. Take precautions whenever you go out to eat, run errands by car or on foot, or do any outdoor activity. There isnt just one easy way to protect your skin. Its best to follow all of these steps:  Wear tightly woven clothing that covers your skin. Put on a wide-brimmed hat to protect your face, ears, and scalp.  Watch the clock. Try to avoid the sun between 10 a.m. and 4 p.m., when it is strongest.  Head for the shade or create your own. Use an umbrella when sitting or strolling.  Know that the suns rays can reflect off sand, water, and snow. This can harm your skin. Take extra care when you are near reflective surfaces.  Keep in mind that even when the weather is hazy or cloudy, your skin can be exposed to strong UV rays.  Shield your skin with sunscreen. Also, apply sunscreen to your childrens skin.  Tips for Using Sunscreen  To help prevent skin cancer, choose the right sunscreen and use it correctly. Try the following tips:  Choose a sunscreen that has a sun protection factor (SPF) of at least 15. For the best protection, an SPF of at least 30 is preferred. Also, choose a  sunscreen labeled broad spectrum. This will shield you from both UVA and UVB (ultraviolet A and B) rays.  If one brand irritates your skin, try another, particularly ones without fragrance.  Use a water-resistant sunscreen if swimming or sweating.  Reapply sunscreen every 2 hours. If youre active, do this more often.  Cover any sun-exposed skin, from your face to your feet. Dont forget your ears and your lips.  Know that while sunscreen helps protect you, it isnt enough. You should also wear protective clothing. And try to stay out of the sun as much as you can, especially from 10 a.m. to 4 p.m.  © 9665-7429 [employer]. [address] [place] [address], [place] [address]. All rights reserved. This information is not intended as a substitute for professional medical care. Always follow your healthcare professional's instructions.

## 2024-10-14 NOTE — PROGRESS NOTES
Subjective:      Patient ID:  Jhoana Sierra is a 59 y.o. female who presents for   Chief Complaint   Patient presents with    Skin Check     Fbse. No new concerns.  No hx of skin cancer. Denies family hx of melanoma.       Hx of colon CA, multiple nevi, last seen by Dr. Pulliam on 11/17/20.  This is a high risk patient here to check for the development of new lesions. Denies bleeding, tender, growing, or concerning lesions.     The patient denies personal or family history of skin cancer. Uncertain of father's history.          Review of Systems   Constitutional:  Negative for fever and chills.   Gastrointestinal:  Negative for nausea and vomiting.   Skin:  Positive for activity-related sunscreen use. Negative for daily sunscreen use and recent sunburn.   Hematologic/Lymphatic: Does not bruise/bleed easily.       Objective:   Physical Exam   Constitutional: She appears well-developed and well-nourished. No distress.   Neurological: She is alert and oriented to person, place, and time. She is not disoriented.   Psychiatric: She has a normal mood and affect.   Skin:   Areas Examined (abnormalities noted in diagram):   Scalp / Hair Palpated and Inspected  Head / Face Inspection Performed  Neck Inspection Performed  Chest / Axilla Inspection Performed  Abdomen Inspection Performed  Back Inspection Performed  RUE Inspected  LUE Inspection Performed  RLE Inspected  LLE Inspection Performed  Nails and Digits Inspection Performed                 Diagram Legend     Erythematous scaling macule/papule c/w actinic keratosis       Vascular papule c/w angioma      Pigmented verrucoid papule/plaque c/w seborrheic keratosis      Yellow umbilicated papule c/w sebaceous hyperplasia      Irregularly shaped tan macule c/w lentigo     1-2 mm smooth white papules consistent with Milia      Movable subcutaneous cyst with punctum c/w epidermal inclusion cyst      Subcutaneous movable cyst c/w pilar cyst      Firm pink to brown papule c/w  dermatofibroma      Pedunculated fleshy papule(s) c/w skin tag(s)      Evenly pigmented macule c/w junctional nevus     Mildly variegated pigmented, slightly irregular-bordered macule c/w mildly atypical nevus      Flesh colored to evenly pigmented papule c/w intradermal nevus       Pink pearly papule/plaque c/w basal cell carcinoma      Erythematous hyperkeratotic cursted plaque c/w SCC      Surgical scar with no sign of skin cancer recurrence      Open and closed comedones      Inflammatory papules and pustules      Verrucoid papule consistent consistent with wart     Erythematous eczematous patches and plaques     Dystrophic onycholytic nail with subungual debris c/w onychomycosis     Umbilicated papule    Erythematous-base heme-crusted tan verrucoid plaque consistent with inflamed seborrheic keratosis     Erythematous Silvery Scaling Plaque c/w Psoriasis     See annotation      Assessment / Plan:        Multiple nevi  Lentigines  Reassurance given.  Discussed ABCDEF of melanoma and changes for patient to look for.  AAD Handout given. Discussed importance of daily use of sunscreen which is broad-spectrum and has a minimum SPF of 30.    Total body skin examination performed today including at least 12 points as noted in physical examination. No lesions suspicious for malignancy noted.    Seborrheic keratosis  Reassurance given. Discussed diagnosis and that lesions are benign.  AAD handout given.              Follow up if symptoms worsen or fail to improve.

## 2024-10-19 DIAGNOSIS — Z88.8 ALLERGY TO STATIN MEDICATION: ICD-10-CM

## 2024-10-19 DIAGNOSIS — I21.02 ST ELEVATION MYOCARDIAL INFARCTION INVOLVING LEFT ANTERIOR DESCENDING (LAD) CORONARY ARTERY: ICD-10-CM

## 2024-10-21 RX ORDER — EVOLOCUMAB 140 MG/ML
INJECTION, SOLUTION SUBCUTANEOUS
Qty: 6 ML | Refills: 3 | Status: SHIPPED | OUTPATIENT
Start: 2024-10-21

## 2024-10-21 RX ORDER — EVOLOCUMAB 140 MG/ML
140 INJECTION, SOLUTION SUBCUTANEOUS
Qty: 6 ML | Refills: 3 | Status: SHIPPED | OUTPATIENT
Start: 2024-10-21

## 2024-10-28 ENCOUNTER — OFFICE VISIT (OUTPATIENT)
Dept: SURGERY | Facility: CLINIC | Age: 59
End: 2024-10-28
Payer: COMMERCIAL

## 2024-10-28 ENCOUNTER — LAB VISIT (OUTPATIENT)
Dept: LAB | Facility: HOSPITAL | Age: 59
End: 2024-10-28
Attending: COLON & RECTAL SURGERY
Payer: COMMERCIAL

## 2024-10-28 VITALS
HEIGHT: 64 IN | DIASTOLIC BLOOD PRESSURE: 86 MMHG | TEMPERATURE: 99 F | HEART RATE: 69 BPM | WEIGHT: 155.19 LBS | SYSTOLIC BLOOD PRESSURE: 148 MMHG | BODY MASS INDEX: 26.49 KG/M2

## 2024-10-28 DIAGNOSIS — Z85.038 PERSONAL HISTORY OF COLON CANCER: ICD-10-CM

## 2024-10-28 DIAGNOSIS — C18.9 COLON ADENOCARCINOMA: ICD-10-CM

## 2024-10-28 DIAGNOSIS — Z85.038 PERSONAL HISTORY OF COLON CANCER: Primary | ICD-10-CM

## 2024-10-28 LAB — CEA SERPL-MCNC: 1.9 NG/ML (ref 0–5)

## 2024-10-28 PROCEDURE — 36415 COLL VENOUS BLD VENIPUNCTURE: CPT | Performed by: COLON & RECTAL SURGERY

## 2024-10-28 PROCEDURE — 99999 PR PBB SHADOW E&M-EST. PATIENT-LVL IV: CPT | Mod: PBBFAC,,, | Performed by: COLON & RECTAL SURGERY

## 2024-10-28 PROCEDURE — 82378 CARCINOEMBRYONIC ANTIGEN: CPT | Performed by: COLON & RECTAL SURGERY

## 2024-10-29 ENCOUNTER — PATIENT MESSAGE (OUTPATIENT)
Dept: SURGERY | Facility: CLINIC | Age: 59
End: 2024-10-29
Payer: COMMERCIAL

## 2024-12-12 ENCOUNTER — PATIENT MESSAGE (OUTPATIENT)
Dept: SURGERY | Facility: CLINIC | Age: 59
End: 2024-12-12
Payer: COMMERCIAL

## 2024-12-20 ENCOUNTER — PATIENT MESSAGE (OUTPATIENT)
Dept: SURGERY | Facility: CLINIC | Age: 59
End: 2024-12-20
Payer: COMMERCIAL

## 2025-01-14 ENCOUNTER — PATIENT MESSAGE (OUTPATIENT)
Dept: CARDIOLOGY | Facility: CLINIC | Age: 60
End: 2025-01-14
Payer: COMMERCIAL

## 2025-01-14 ENCOUNTER — PATIENT MESSAGE (OUTPATIENT)
Dept: SURGERY | Facility: CLINIC | Age: 60
End: 2025-01-14
Payer: COMMERCIAL

## 2025-01-14 DIAGNOSIS — Z85.038 PERSONAL HISTORY OF COLON CANCER: Primary | ICD-10-CM

## 2025-01-14 RX ORDER — SODIUM, POTASSIUM,MAG SULFATES 17.5-3.13G
1 SOLUTION, RECONSTITUTED, ORAL ORAL DAILY
Qty: 1 KIT | Refills: 0 | Status: SHIPPED | OUTPATIENT
Start: 2025-01-14 | End: 2025-01-16

## 2025-01-27 ENCOUNTER — LAB VISIT (OUTPATIENT)
Dept: LAB | Facility: HOSPITAL | Age: 60
End: 2025-01-27
Attending: COLON & RECTAL SURGERY
Payer: COMMERCIAL

## 2025-01-27 DIAGNOSIS — Z85.038 PERSONAL HISTORY OF COLON CANCER: ICD-10-CM

## 2025-01-27 DIAGNOSIS — C18.9 COLON ADENOCARCINOMA: ICD-10-CM

## 2025-01-27 LAB — CEA SERPL-MCNC: 2.1 NG/ML (ref 0–5)

## 2025-01-27 PROCEDURE — 82378 CARCINOEMBRYONIC ANTIGEN: CPT | Performed by: COLON & RECTAL SURGERY

## 2025-01-27 PROCEDURE — 36415 COLL VENOUS BLD VENIPUNCTURE: CPT | Mod: PO | Performed by: COLON & RECTAL SURGERY

## 2025-01-28 ENCOUNTER — HOSPITAL ENCOUNTER (OUTPATIENT)
Dept: RADIOLOGY | Facility: HOSPITAL | Age: 60
Discharge: HOME | End: 2025-01-28
Attending: COLON & RECTAL SURGERY
Payer: COMMERCIAL

## 2025-01-28 ENCOUNTER — PATIENT MESSAGE (OUTPATIENT)
Dept: CARDIOLOGY | Facility: CLINIC | Age: 60
End: 2025-01-28
Payer: COMMERCIAL

## 2025-01-28 ENCOUNTER — TELEPHONE (OUTPATIENT)
Dept: CARDIOLOGY | Facility: CLINIC | Age: 60
End: 2025-01-28
Payer: COMMERCIAL

## 2025-01-28 DIAGNOSIS — Z85.038 PERSONAL HISTORY OF COLON CANCER: ICD-10-CM

## 2025-01-28 DIAGNOSIS — C18.9 COLON ADENOCARCINOMA: ICD-10-CM

## 2025-01-28 PROCEDURE — 74177 CT ABD & PELVIS W/CONTRAST: CPT | Mod: TC,PN

## 2025-01-28 PROCEDURE — 74177 CT ABD & PELVIS W/CONTRAST: CPT | Mod: 26,,, | Performed by: RADIOLOGY

## 2025-01-28 PROCEDURE — 25500020 PHARM REV CODE 255: Mod: PN | Performed by: COLON & RECTAL SURGERY

## 2025-01-28 PROCEDURE — 71260 CT THORAX DX C+: CPT | Mod: 26,,, | Performed by: RADIOLOGY

## 2025-01-28 PROCEDURE — A9698 NON-RAD CONTRAST MATERIALNOC: HCPCS | Mod: PN | Performed by: COLON & RECTAL SURGERY

## 2025-01-28 RX ADMIN — IOHEXOL 1000 ML: 12 SOLUTION ORAL at 09:01

## 2025-01-28 RX ADMIN — IOHEXOL 100 ML: 350 INJECTION, SOLUTION INTRAVENOUS at 09:01

## 2025-01-28 NOTE — TELEPHONE ENCOUNTER
A prior authorization has been initiated for the medication Repatha                             through [employer]    [facility] [employer] is reviewing your PA request and will respond within 24 hours for Medicaid or up to 72 hours for non-Medicaid plans, based on the required timeframe determined by state or federal regulations. To check for an update later, open this request from your dashboard.

## 2025-01-29 ENCOUNTER — TELEPHONE (OUTPATIENT)
Dept: CARDIOLOGY | Facility: CLINIC | Age: 60
End: 2025-01-29
Payer: COMMERCIAL

## 2025-01-29 NOTE — TELEPHONE ENCOUNTER
Initiated appeal over the phone with [employer]. Clinical information included. Appeal decision can take up to 15 days. Informed by patient she is due for another injection on 2/1/25. She noted she will not pay for medication out of pocket.    Reason for denial was pt LDL was appropriate and not medically necessary. Advised rep her LDL is controlled due to being on medication since 7/2025.   Case ID [id number]   [employer] [facility] appeal [phone] option 4

## 2025-02-12 ENCOUNTER — ANESTHESIA EVENT (OUTPATIENT)
Dept: ENDOSCOPY | Facility: HOSPITAL | Age: 60
End: 2025-02-12
Payer: COMMERCIAL

## 2025-02-12 ENCOUNTER — HOSPITAL ENCOUNTER (OUTPATIENT)
Dept: ENDOSCOPY | Facility: HOSPITAL | Age: 60
Discharge: HOME | End: 2025-02-12
Admitting: COLON & RECTAL SURGERY
Payer: COMMERCIAL

## 2025-02-12 ENCOUNTER — ANESTHESIA (OUTPATIENT)
Dept: ENDOSCOPY | Facility: HOSPITAL | Age: 60
End: 2025-02-12
Payer: COMMERCIAL

## 2025-02-12 DIAGNOSIS — Z12.11 ENCOUNTER FOR SCREENING COLONOSCOPY: Primary | ICD-10-CM

## 2025-02-12 DIAGNOSIS — Z85.038 PERSONAL HISTORY OF COLON CANCER: ICD-10-CM

## 2025-02-12 PROCEDURE — G0105 COLORECTAL SCRN; HI RISK IND: HCPCS | Mod: ,,, | Performed by: COLON & RECTAL SURGERY

## 2025-02-12 PROCEDURE — 37000008 HC ANESTHESIA 1ST 15 MINUTES

## 2025-02-12 PROCEDURE — G0105 COLORECTAL SCRN; HI RISK IND: HCPCS | Performed by: COLON & RECTAL SURGERY

## 2025-02-12 PROCEDURE — 37000009 HC ANESTHESIA EA ADD 15 MINS

## 2025-02-12 PROCEDURE — 63600175 PHARM REV CODE 636 W HCPCS: Performed by: STUDENT IN AN ORGANIZED HEALTH CARE EDUCATION/TRAINING PROGRAM

## 2025-02-12 RX ORDER — SODIUM CHLORIDE, SODIUM LACTATE, POTASSIUM CHLORIDE, CALCIUM CHLORIDE 600; 310; 30; 20 MG/100ML; MG/100ML; MG/100ML; MG/100ML
INJECTION, SOLUTION INTRAVENOUS CONTINUOUS PRN
Status: DISCONTINUED | OUTPATIENT
Start: 2025-02-12 | End: 2025-02-12

## 2025-02-12 RX ORDER — PROPOFOL 10 MG/ML
VIAL (ML) INTRAVENOUS
Status: DISCONTINUED | OUTPATIENT
Start: 2025-02-12 | End: 2025-02-12

## 2025-02-12 RX ORDER — LIDOCAINE HYDROCHLORIDE 10 MG/ML
INJECTION, SOLUTION EPIDURAL; INFILTRATION; INTRACAUDAL; PERINEURAL
Status: DISCONTINUED | OUTPATIENT
Start: 2025-02-12 | End: 2025-02-12

## 2025-02-12 RX ADMIN — PROPOFOL 50 MG: 10 INJECTION, EMULSION INTRAVENOUS at 08:02

## 2025-02-12 RX ADMIN — PROPOFOL 100 MG: 10 INJECTION, EMULSION INTRAVENOUS at 08:02

## 2025-02-12 RX ADMIN — LIDOCAINE HYDROCHLORIDE 50 MG: 10 SOLUTION INTRAVENOUS at 08:02

## 2025-02-12 RX ADMIN — SODIUM CHLORIDE, SODIUM LACTATE, POTASSIUM CHLORIDE, AND CALCIUM CHLORIDE: 600; 310; 30; 20 INJECTION, SOLUTION INTRAVENOUS at 08:02

## 2025-02-12 NOTE — TRANSFER OF CARE
"Anesthesia Transfer of Care Note    Patient: Jhoana Sierra    Procedure(s) Performed: * No procedures listed *    Patient location: PACU    Anesthesia Type: MAC    Transport from OR: Transported from OR on room air with adequate spontaneous ventilation    Post pain: adequate analgesia    Post assessment: no apparent anesthetic complications    Post vital signs: stable    Level of consciousness: responds to stimulation and awake    Nausea/Vomiting: no nausea/vomiting    Complications: none    Transfer of care protocol was followedComments: Report given to PACU RN at bedside. Hand off tool used. RN given opportunity to ask questions or clarify concerns. No Concerns verbalized. RN was asked if ready to assume care of patient. RN verbally confirmed. Pt. left in stable condition. SV. Vital Signs Return to Near Baseline. No s/s of distress noted.       Last vitals: Visit Vitals  BP (!) 160/76 (BP Location: Left arm, Patient Position: Lying)   Pulse 79   Temp 37.2 °C (99 °F) (Temporal)   Resp 17   Ht 5' 4" (1.626 m)   Wt 65.8 kg (145 lb)   LMP 08/25/2017   SpO2 99%   Breastfeeding No   BMI 24.89 kg/m²     "

## 2025-02-12 NOTE — PLAN OF CARE
MD Sadia at bedside speaking with pt/ about procedure at this time. Questions answered. No complaints noted per pt.

## 2025-02-12 NOTE — H&P
[facility] ([facility])  Colon and Rectal Surgery  History & Physical    Patient Name: Jhoana Sierra  MRN: [id number]  Admission Date: 2/12/2025  Attending Physician: Ziggy Hannah MD  Primary Care Provider: Annette Mayorga MD    Patient information was obtained from patient and medical records.    Subjective:     Chief Complaint/Reason for Admission: Here for Colonoscopy    History of Present Illness:  Patient is a 60 y.o. female presents for colonoscopy. Last cscope in 2022. Has personal hx of rectosigmoid cancer. No current hematochezia or melena. No fam hx of CRC.    Current Outpatient Medications on File Prior to Encounter   Medication Sig    aspirin 81 mg Cap 81 MG  .    evolocumab (REPATHA SURECLICK) 140 mg/mL PnIj INJECT 1ML INTO THE SKIN EVERY 14 DAYS    metoprolol succinate (TOPROL-XL) 25 MG 24 hr tablet Take 1 tablet by mouth once daily    tiZANidine 4 mg Cap Take 1 capsule by mouth daily as needed (pain).    evolocumab (REPATHA SURECLICK) 140 mg/mL PnIj Inject 1 mL (140 mg total) into the skin every 14 (fourteen) days.     No current facility-administered medications on file prior to encounter.       Review of patient's allergies indicates:  No Known Allergies    Past Medical History:   Diagnosis Date    Adenocarcinoma of colon     Colon cancer 2020    Heart attack 01/22/2022    PONV (postoperative nausea and vomiting)      Past Surgical History:   Procedure Laterality Date    COLONOSCOPY N/A 08/25/2020    Procedure: COLONOSCOPY;  Surgeon: Rianna Dillon MD;  Location: [facility] ENDO;  Service: Endoscopy;  Laterality: N/A;    COLONOSCOPY N/A 01/07/2022    Procedure: COLONOSCOPY;  Surgeon: Ziggy Hannah MD;  Location: [facility] ENDO;  Service: General;  Laterality: N/A;    DIAGNOSTIC LAPAROSCOPY N/A 09/07/2022    Procedure: LAPAROSCOPY, DIAGNOSTIC;  Surgeon: Ziggy Hannah MD;  Location: [facility] OR;  Service: General;  Laterality: N/A;    FLEXIBLE SIGMOIDOSCOPY N/A 10/08/2020     Procedure: SIGMOIDOSCOPY, FLEXIBLE;  Surgeon: Ziggy Hannah MD;  Location: [facility] OR;  Service: General;  Laterality: N/A;    HYSTERECTOMY      INJECTION OF ANESTHETIC AGENT INTO TISSUE PLANE DEFINED BY TRANSVERSUS ABDOMINIS MUSCLE N/A 10/08/2020    Procedure: BLOCK, TRANSVERSUS ABDOMINIS PLANE;  Surgeon: Ziggy Hannah MD;  Location: [facility] OR;  Service: General;  Laterality: N/A;    LAPAROSCOPIC LYSIS OF ADHESIONS  09/07/2022    Procedure: LYSIS, ADHESIONS, LAPAROSCOPIC;  Surgeon: Ziggy Hannah MD;  Location: [facility] OR;  Service: General;;    LAPAROSCOPIC SIGMOIDECTOMY N/A 10/08/2020    Procedure: COLECTOMY, SIGMOID, LAPAROSCOPIC;  Surgeon: Ziggy Hannah MD;  Location: [facility] OR;  Service: General;  Laterality: N/A;  lower anterior resection  Proctosigmoidectomy    LAPAROTOMY N/A 10/08/2020    Procedure: LAPAROTOMY;  Surgeon: Ziggy Hannah MD;  Location: [facility] OR;  Service: General;  Laterality: N/A;    OVARIAN CYST REMOVAL      WISDOM TOOTH EXTRACTION      XI ROBOTIC HYSTERECTOMY, WITH SALPINGO-OOPHORECTOMY Bilateral 09/07/2022    Procedure: XI ROBOTIC HYSTERECTOMY,WITH SALPINGO-OOPHORECTOMY;  Surgeon: Bela Noel MD;  Location: [facility] OR;  Service: OB/GYN;  Laterality: Bilateral;     Family History       Problem Relation (Age of Onset)    Fibromyalgia Sister          Tobacco Use    Smoking status: Never     Passive exposure: Never    Smokeless tobacco: Never   Substance and Sexual Activity    Alcohol use: Yes     Alcohol/week: 2.0 standard drinks of alcohol     Types: 1 Glasses of wine, 1 Shots of liquor per week     Comment: Occ use; HOLD 72HRS PRIOR TO SURGERY    Drug use: No    Sexual activity: Yes     Partners: Male     Birth control/protection: Post-menopausal     Review of Systems   Constitutional:  Negative for activity change, appetite change, chills, fatigue, fever and unexpected weight change.   HENT:  Negative for congestion, ear pain, sore throat and trouble swallowing.    Eyes:   Negative for pain, redness and itching.   Respiratory:  Negative for cough, shortness of breath and wheezing.    Cardiovascular:  Negative for chest pain, palpitations and leg swelling.   Gastrointestinal:  Negative for abdominal distention, abdominal pain, anal bleeding, blood in stool, constipation, diarrhea, nausea, rectal pain and vomiting.   Endocrine: Negative for cold intolerance, heat intolerance and polyuria.   Genitourinary:  Negative for dysuria, flank pain, frequency and hematuria.   Musculoskeletal:  Negative for gait problem, joint swelling and neck pain.   Skin:  Negative for color change, rash and wound.   Allergic/Immunologic: Negative for environmental allergies and immunocompromised state.   Neurological:  Negative for dizziness, speech difficulty, weakness and numbness.   Psychiatric/Behavioral:  Negative for agitation, confusion and hallucinations.      Objective:     Vital Signs (Most Recent):  Temp: 99 °F (37.2 °C) (02/12/25 0733)  Pulse: 79 (02/12/25 0733)  Resp: 17 (02/12/25 0733)  BP: (!) 160/76 (02/12/25 0733)  SpO2: 99 % (02/12/25 0733) Vital Signs (24h Range):  Temp:  [99 °F (37.2 °C)] 99 °F (37.2 °C)  Pulse:  [79] 79  Resp:  [17] 17  SpO2:  [99 %] 99 %  BP: (160)/(76) 160/76     Weight: 65.8 kg (145 lb)  Body mass index is 24.89 kg/m².    Physical Exam  Constitutional:       Appearance: She is well-developed.   HENT:      Head: Normocephalic and atraumatic.   Eyes:      Conjunctiva/sclera: Conjunctivae normal.   Neck:      Thyroid: No thyromegaly.   Cardiovascular:      Rate and Rhythm: Normal rate and regular rhythm.   Pulmonary:      Effort: Pulmonary effort is normal. No respiratory distress.   Abdominal:      General: There is no distension.      Palpations: Abdomen is soft. There is no mass.      Tenderness: There is no abdominal tenderness.   Musculoskeletal:         General: No tenderness. Normal range of motion.      Cervical back: Normal range of motion.   Skin:     General:  Skin is warm and dry.      Capillary Refill: Capillary refill takes less than 2 seconds.      Findings: No rash.   Neurological:      Mental Status: She is alert and oriented to person, place, and time.       Assessment/Plan:     Patient is a 60 y.o. female who presents for colonoscopy     - Ok to proceed to endoscopy suite for colonoscopy  - Consent obtained. All risks, benefits and alternatives fully explained to patient, including but not limited to bleeding, infection, perforation, and missed polyps. All questions appropriately answered to patient's satisfaction. Consent signed and placed on chart.    There are no hospital problems to display for this patient.    VTE Risk Mitigation (From admission, onward)      None            Ziggy Hannah MD  Colon and Rectal Surgery  [facility] ([facility])

## 2025-02-12 NOTE — ANESTHESIA POSTPROCEDURE EVALUATION
Anesthesia Post Evaluation    Patient: Jhoana Sierra    Procedure(s) Performed: * No procedures listed *    Final Anesthesia Type: MAC      Patient location during evaluation: PACU  Patient participation: Yes- Able to Participate  Level of consciousness: awake and alert and oriented  Post-procedure vital signs: reviewed and stable  Pain management: adequate  Airway patency: patent  VALENTINO mitigation strategies: Multimodal analgesia and Extubation and recovery carried out in lateral, semiupright, or other nonsupine position  PONV status at discharge: No PONV  Anesthetic complications: no      Cardiovascular status: blood pressure returned to baseline and hemodynamically stable  Respiratory status: unassisted and spontaneous ventilation  Hydration status: euvolemic  Follow-up not needed.  Comments: Report given to PACU RN. Hand Off Tool Used. RN given opportunity to ask questions or clarify concerns. No Concerns verbalized. Pt. Left in stable condition. SV. Vital Signs Return to Near Baseline. No s/s of distress noted.               Vitals Value Taken Time   /76 02/12/25 0733   Temp 37.2 °C (99 °F) 02/12/25 0733   Pulse 79 02/12/25 0733   Resp 17 02/12/25 0733   SpO2 99 % 02/12/25 0733         No case tracking events are documented in the log.      Pain/Jackson Score: No data recorded

## 2025-02-12 NOTE — BRIEF OP NOTE
[facility] ([facility])  Brief Operative Note     SUMMARY     Surgery Date: 2/12/2025     Surgeon: Ziggy Hannah MD    Pre-op Diagnosis:  Screen for Colon Cancer    Post-op Diagnosis:  Screen for Colon Cancer    Procedure: Colonoscopy    Anesthesia: MAC    Description of the findings of the procedure: no polyps removed    Estimated Blood Loss: minimal         Specimens:   Specimen (24h ago, onward)      None

## 2025-02-12 NOTE — ANESTHESIA PREPROCEDURE EVALUATION
02/12/2025  Jhoana Sierra is a 60 y.o., female.    Anesthesia Evaluation    I have reviewed the Patient Summary Reports.     I have reviewed the Nursing Notes. I have reviewed the NPO Status.   I have reviewed the Medications.     Review of Systems  Anesthesia Hx:  No problems with previous Anesthesia             Denies Family Hx of Anesthesia complications.    Denies Personal Hx of Anesthesia complications.                    Social:  Non-Smoker, No Alcohol Use       Cardiovascular:  Cardiovascular Normal                  ECG has been reviewed.                            Pulmonary:  Pulmonary Normal                       Renal/:  Renal/ Normal                 Hepatic/GI:  Hepatic/GI Normal                    Neurological:  Neurology Normal                                      Endocrine:  Endocrine Normal            Psych:  Psychiatric Normal                  Patient Active Problem List   Diagnosis    Encounter for screening colonoscopy    Positive FIT (fecal immunochemical test)    Adenocarcinoma of colon    Colon adenocarcinoma    Lymphocytic colitis    Spontaneous dissection of coronary artery    ST elevation myocardial infarction (STEMI)    Fibromuscular dysplasia    Elevated LFTs    Abnormal EKG    Personal history of colon cancer    Allergy to statin medication     Past Surgical History:   Procedure Laterality Date    COLONOSCOPY N/A 08/25/2020    Procedure: COLONOSCOPY;  Surgeon: Rianna Dillon MD;  Location: [facility];  Service: Endoscopy;  Laterality: N/A;    COLONOSCOPY N/A 01/07/2022    Procedure: COLONOSCOPY;  Surgeon: Ziggy Hannah MD;  Location: [facility] ENDO;  Service: General;  Laterality: N/A;    DIAGNOSTIC LAPAROSCOPY N/A 09/07/2022    Procedure: LAPAROSCOPY, DIAGNOSTIC;  Surgeon: Ziggy Hannah MD;  Location: [facility] OR;  Service: General;  Laterality: N/A;    FLEXIBLE  SIGMOIDOSCOPY N/A 10/08/2020    Procedure: SIGMOIDOSCOPY, FLEXIBLE;  Surgeon: Ziggy Hannah MD;  Location: [facility] OR;  Service: General;  Laterality: N/A;    HYSTERECTOMY      INJECTION OF ANESTHETIC AGENT INTO TISSUE PLANE DEFINED BY TRANSVERSUS ABDOMINIS MUSCLE N/A 10/08/2020    Procedure: BLOCK, TRANSVERSUS ABDOMINIS PLANE;  Surgeon: Ziggy Hannah MD;  Location: [facility] OR;  Service: General;  Laterality: N/A;    LAPAROSCOPIC LYSIS OF ADHESIONS  09/07/2022    Procedure: LYSIS, ADHESIONS, LAPAROSCOPIC;  Surgeon: Ziggy Hannah MD;  Location: [facility] OR;  Service: General;;    LAPAROSCOPIC SIGMOIDECTOMY N/A 10/08/2020    Procedure: COLECTOMY, SIGMOID, LAPAROSCOPIC;  Surgeon: Ziggy Hannah MD;  Location: [facility] OR;  Service: General;  Laterality: N/A;  lower anterior resection  Proctosigmoidectomy    LAPAROTOMY N/A 10/08/2020    Procedure: LAPAROTOMY;  Surgeon: Ziggy Hannah MD;  Location: [facility] OR;  Service: General;  Laterality: N/A;    OVARIAN CYST REMOVAL      WISDOM TOOTH EXTRACTION      XI ROBOTIC HYSTERECTOMY, WITH SALPINGO-OOPHORECTOMY Bilateral 09/07/2022    Procedure: XI ROBOTIC HYSTERECTOMY,WITH SALPINGO-OOPHORECTOMY;  Surgeon: Bela Noel MD;  Location: [facility];  Service: OB/GYN;  Laterality: Bilateral;         Physical Exam  General:  Well nourished       Airway/Jaw/Neck:  Airway Findings: Mouth Opening: Normal     Tongue: Normal      General Airway Assessment: Adult      Mallampati: II   TM Distance: 4 - 6 cm   Jaw/Neck Findings:     Neck ROM: Normal ROM          Dental:  Dental Findings: In tact      Chest/Lungs:  Chest/Lungs Findings:  Clear to auscultation, Normal Respiratory Rate       Heart/Vascular:  Heart Findings: Rate: Normal  Rhythm: Regular Rhythm  Sounds: Normal                       Mental Status:  Mental Status Findings:  Cooperative, Alert and Oriented       Lab Results   Component Value Date    WBC 4.25 06/24/2024    HGB 12.9 06/24/2024    HCT 41.3 06/24/2024    MCV 97  06/24/2024     06/24/2024         Chemistry        Component Value Date/Time     06/24/2024 0722    K 4.4 06/24/2024 0722     06/24/2024 0722    CO2 24 06/24/2024 0722    BUN 18 06/24/2024 0722    CREATININE 0.8 06/24/2024 0722    GLU 88 06/24/2024 0722        Component Value Date/Time    CALCIUM 9.8 06/24/2024 0722    ALKPHOS 74 06/24/2024 0722    AST 18 06/24/2024 0722    ALT 19 06/24/2024 0722    BILITOT 0.5 06/24/2024 0722    ESTGFRAFRICA >60.0 07/25/2022 1037    EGFRNONAA >60.0 07/25/2022 1037            Anesthesia Plan  Type of Anesthesia, risks & benefits discussed:  Anesthesia Type:  MAC    Patient's Preference:   Plan Factors:          Intra-op Monitoring Plan: standard ASA monitors  Intra-op Monitoring Plan Comments:   Post Op Pain Control Plan: per primary service following discharge from PACU  Post Op Pain Control Plan Comments:     Induction:   IV  Beta Blocker:  Patient is not currently on a Beta-Blocker (No further documentation required).       Informed Consent: Informed consent signed with the Patient and all parties understand the risks and agree with anesthesia plan.  All questions answered.  Anesthesia consent signed with patient.  ASA Score: 2     Day of Surgery Review of History & Physical: I have interviewed and examined the patient. I have reviewed the patient's H&P dated:  There are no significant changes.            Ready For Surgery From Anesthesia Perspective.             Physical Exam  General: Well nourished    Airway:  Mallampati: II   Mouth Opening: Normal  TM Distance: 4 - 6 cm  Tongue: Normal  Neck ROM: Normal ROM    Dental:  In tact    Chest/Lungs:  Clear to auscultation, Normal Respiratory Rate    Heart:  Rate: Normal  Rhythm: Regular Rhythm  Sounds: Normal          Anesthesia Plan  Type of Anesthesia, risks & benefits discussed:    Anesthesia Type: MAC  Intra-op Monitoring Plan: standard ASA monitors  Post Op Pain Control Plan: per primary service following  discharge from PACU  Induction:  IV  Informed Consent: Informed consent signed with the Patient and all parties understand the risks and agree with anesthesia plan.  All questions answered.   ASA Score: 2  Day of Surgery Review of History & Physical: I have interviewed and examined the patient. I have reviewed the patient's H&P dated:     Ready For Surgery From Anesthesia Perspective.       .  Sinus bradycardia   Cannot rule out Anterior infarct ,age undetermined   Abnormal ECG   When compared with ECG of 14-FEB-2022 16:03,   Nonspecific T wave abnormality, improved in Inferior leads   T wave inversion no longer evident in Anterior-lateral leads   Confirmed by CATA CHAVARRIA, [facility] ([id number]) on 7/7/2022 9:53:47 PM     Echo 4/18/18:    CONCLUSIONS     1 - No wall motion abnormalities.     2 - Normal left ventricular systolic function (EF 60-65%).     3 - Normal left ventricular diastolic function.     4 - Normal right ventricular systolic function .     5 - The estimated PA systolic pressure is greater than 28 mmHg.     6 - Mild tricuspid regurgitation.

## 2025-02-13 VITALS
WEIGHT: 145 LBS | HEART RATE: 61 BPM | TEMPERATURE: 98 F | SYSTOLIC BLOOD PRESSURE: 111 MMHG | HEIGHT: 64 IN | BODY MASS INDEX: 24.75 KG/M2 | OXYGEN SATURATION: 97 % | DIASTOLIC BLOOD PRESSURE: 59 MMHG | RESPIRATION RATE: 20 BRPM

## 2025-02-17 ENCOUNTER — LAB VISIT (OUTPATIENT)
Dept: LAB | Facility: HOSPITAL | Age: 60
End: 2025-02-17
Attending: INTERNAL MEDICINE
Payer: COMMERCIAL

## 2025-02-17 DIAGNOSIS — Z88.8 ALLERGY TO STATIN MEDICATION: ICD-10-CM

## 2025-02-17 DIAGNOSIS — I21.02 ST ELEVATION MYOCARDIAL INFARCTION INVOLVING LEFT ANTERIOR DESCENDING (LAD) CORONARY ARTERY: ICD-10-CM

## 2025-02-17 DIAGNOSIS — I25.42 SPONTANEOUS DISSECTION OF CORONARY ARTERY: ICD-10-CM

## 2025-02-17 LAB
ALBUMIN SERPL BCP-MCNC: 4 G/DL (ref 3.5–5.2)
ALP SERPL-CCNC: 73 U/L (ref 40–150)
ALT SERPL W/O P-5'-P-CCNC: 18 U/L (ref 10–44)
ANION GAP SERPL CALC-SCNC: 9 MMOL/L (ref 8–16)
AST SERPL-CCNC: 20 U/L (ref 10–40)
BILIRUB SERPL-MCNC: 0.4 MG/DL (ref 0.1–1)
BUN SERPL-MCNC: 14 MG/DL (ref 6–20)
CALCIUM SERPL-MCNC: 9.8 MG/DL (ref 8.7–10.5)
CHLORIDE SERPL-SCNC: 106 MMOL/L (ref 95–110)
CHOLEST SERPL-MCNC: 172 MG/DL (ref 120–199)
CHOLEST/HDLC SERPL: 2.7 {RATIO} (ref 2–5)
CO2 SERPL-SCNC: 27 MMOL/L (ref 23–29)
CREAT SERPL-MCNC: 0.7 MG/DL (ref 0.5–1.4)
EST. GFR  (NO RACE VARIABLE): >60 ML/MIN/1.73 M^2
GLUCOSE SERPL-MCNC: 87 MG/DL (ref 70–110)
HDLC SERPL-MCNC: 63 MG/DL (ref 40–75)
HDLC SERPL: 36.6 % (ref 20–50)
LDLC SERPL CALC-MCNC: 88.4 MG/DL (ref 63–159)
NONHDLC SERPL-MCNC: 109 MG/DL
POTASSIUM SERPL-SCNC: 4.4 MMOL/L (ref 3.5–5.1)
PROT SERPL-MCNC: 6.9 G/DL (ref 6–8.4)
SODIUM SERPL-SCNC: 142 MMOL/L (ref 136–145)
TRIGL SERPL-MCNC: 103 MG/DL (ref 30–150)

## 2025-02-17 PROCEDURE — 80061 LIPID PANEL: CPT | Performed by: INTERNAL MEDICINE

## 2025-02-17 PROCEDURE — 36415 COLL VENOUS BLD VENIPUNCTURE: CPT | Mod: PO | Performed by: INTERNAL MEDICINE

## 2025-02-17 PROCEDURE — 80053 COMPREHEN METABOLIC PANEL: CPT | Performed by: INTERNAL MEDICINE

## 2025-02-25 DIAGNOSIS — R94.31 ABNORMAL EKG: Primary | ICD-10-CM

## 2025-04-28 ENCOUNTER — LAB VISIT (OUTPATIENT)
Dept: LAB | Facility: HOSPITAL | Age: 60
End: 2025-04-28
Attending: COLON & RECTAL SURGERY
Payer: COMMERCIAL

## 2025-04-28 ENCOUNTER — OFFICE VISIT (OUTPATIENT)
Dept: SURGERY | Facility: CLINIC | Age: 60
End: 2025-04-28
Payer: COMMERCIAL

## 2025-04-28 VITALS
SYSTOLIC BLOOD PRESSURE: 133 MMHG | HEIGHT: 64 IN | HEART RATE: 64 BPM | DIASTOLIC BLOOD PRESSURE: 81 MMHG | OXYGEN SATURATION: 98 % | TEMPERATURE: 98 F | WEIGHT: 157.06 LBS | BODY MASS INDEX: 26.81 KG/M2

## 2025-04-28 DIAGNOSIS — Z85.038 PERSONAL HISTORY OF COLON CANCER: ICD-10-CM

## 2025-04-28 DIAGNOSIS — C18.9 COLON ADENOCARCINOMA: ICD-10-CM

## 2025-04-28 DIAGNOSIS — Z85.038 PERSONAL HISTORY OF COLON CANCER: Primary | ICD-10-CM

## 2025-04-28 PROCEDURE — 99214 OFFICE O/P EST MOD 30 MIN: CPT | Mod: S$GLB,,, | Performed by: COLON & RECTAL SURGERY

## 2025-04-28 PROCEDURE — 1159F MED LIST DOCD IN RCRD: CPT | Mod: CPTII,S$GLB,, | Performed by: COLON & RECTAL SURGERY

## 2025-04-28 PROCEDURE — 36415 COLL VENOUS BLD VENIPUNCTURE: CPT

## 2025-04-28 PROCEDURE — 3079F DIAST BP 80-89 MM HG: CPT | Mod: CPTII,S$GLB,, | Performed by: COLON & RECTAL SURGERY

## 2025-04-28 PROCEDURE — 3075F SYST BP GE 130 - 139MM HG: CPT | Mod: CPTII,S$GLB,, | Performed by: COLON & RECTAL SURGERY

## 2025-04-28 PROCEDURE — 99999 PR PBB SHADOW E&M-EST. PATIENT-LVL III: CPT | Mod: PBBFAC,,, | Performed by: COLON & RECTAL SURGERY

## 2025-04-28 PROCEDURE — 3008F BODY MASS INDEX DOCD: CPT | Mod: CPTII,S$GLB,, | Performed by: COLON & RECTAL SURGERY

## 2025-04-28 PROCEDURE — 82378 CARCINOEMBRYONIC ANTIGEN: CPT

## 2025-04-28 NOTE — PROGRESS NOTES
History & Physical    SUBJECTIVE:     CC: rectosigmoid adenocarcinoma  Ref MD: Rianna Dillon MD    History of Present Illness:  Patient is a 60 y.o. 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  Patient had a positive fit test in June 2020 which prompted the scheduling of a colonoscopy.  This colonoscopy was performed in August 2020 and showed a mass in the rectosigmoid region with biopsies resulting in adenocarcinoma.  Patient states that she had seen intermittent blood in her stool for 3 months prior to this.  This was her 1st colonoscopy ever.  She denies any associated fever, chills, nausea, vomiting, diarrhea, constipation, weight loss or night sweats.  She has no known personal family history of colorectal cancer or colonic polyps previously.  Her only significant abdominal surgical history is an open ovarian cyst removal via Pfannenstiel incision.    10/8/2020: Lap LAR (Stage 1: T2N0 adenoCA)    Interval history:  Since last clinic visit, the patient has done well. No hematochezia or melena. No nausea or vomiting, tolerating regular diet.    Review of patient's allergies indicates:  No Known Allergies    Current Outpatient Medications   Medication Sig Dispense Refill    aspirin 81 mg Cap 81 MG  .      metoprolol succinate (TOPROL-XL) 25 MG 24 hr tablet Take 1 tablet by mouth once daily 90 tablet 3    tiZANidine 4 mg Cap Take 1 capsule by mouth daily as needed (pain). 90 capsule 0     No current facility-administered medications for this visit.       Past Medical History:   Diagnosis Date    Adenocarcinoma of colon     Colon cancer 2020    Heart attack 01/22/2022    PONV (postoperative nausea and vomiting)      Past Surgical History:   Procedure Laterality Date    COLONOSCOPY N/A 08/25/2020    Procedure: COLONOSCOPY;  Surgeon: Rianna Dillon MD;  Location: [facility];  Service: Endoscopy;  Laterality: N/A;    COLONOSCOPY N/A 01/07/2022    Procedure: COLONOSCOPY;  Surgeon: Ziggy JAEGER  MD Brielle;  Location: [facility] ENDO;  Service: General;  Laterality: N/A;    DIAGNOSTIC LAPAROSCOPY N/A 09/07/2022    Procedure: LAPAROSCOPY, DIAGNOSTIC;  Surgeon: Ziggy Hannah MD;  Location: [facility] OR;  Service: General;  Laterality: N/A;    FLEXIBLE SIGMOIDOSCOPY N/A 10/08/2020    Procedure: SIGMOIDOSCOPY, FLEXIBLE;  Surgeon: Ziggy Hannah MD;  Location: [facility] OR;  Service: General;  Laterality: N/A;    HYSTERECTOMY      INJECTION OF ANESTHETIC AGENT INTO TISSUE PLANE DEFINED BY TRANSVERSUS ABDOMINIS MUSCLE N/A 10/08/2020    Procedure: BLOCK, TRANSVERSUS ABDOMINIS PLANE;  Surgeon: Ziggy Hannah MD;  Location: [facility] OR;  Service: General;  Laterality: N/A;    LAPAROSCOPIC LYSIS OF ADHESIONS  09/07/2022    Procedure: LYSIS, ADHESIONS, LAPAROSCOPIC;  Surgeon: Ziggy Hannah MD;  Location: [facility] OR;  Service: General;;    LAPAROSCOPIC SIGMOIDECTOMY N/A 10/08/2020    Procedure: COLECTOMY, SIGMOID, LAPAROSCOPIC;  Surgeon: Ziggy Hannah MD;  Location: [facility] OR;  Service: General;  Laterality: N/A;  lower anterior resection  Proctosigmoidectomy    LAPAROTOMY N/A 10/08/2020    Procedure: LAPAROTOMY;  Surgeon: Ziggy Hannah MD;  Location: [facility] OR;  Service: General;  Laterality: N/A;    OVARIAN CYST REMOVAL      WISDOM TOOTH EXTRACTION      XI ROBOTIC HYSTERECTOMY, WITH SALPINGO-OOPHORECTOMY Bilateral 09/07/2022    Procedure: XI ROBOTIC HYSTERECTOMY,WITH SALPINGO-OOPHORECTOMY;  Surgeon: Bela Noel MD;  Location: [facility];  Service: OB/GYN;  Laterality: Bilateral;     Family History   Problem Relation Name Age of Onset    Fibromyalgia Sister      Diabetes Neg Hx      Heart disease Neg Hx       Social History     Tobacco Use    Smoking status: Never     Passive exposure: Never    Smokeless tobacco: Never   Substance Use Topics    Alcohol use: Yes     Alcohol/week: 2.0 standard drinks of alcohol     Types: 1 Glasses of wine, 1 Shots of liquor per week     Comment: Occ use; HOLD 72HRS PRIOR TO SURGERY  "   Drug use: No        Review of Systems:  Review of Systems   Constitutional:  Negative for activity change, appetite change, chills, fatigue, fever and unexpected weight change.   HENT:  Negative for congestion, ear pain, sore throat and trouble swallowing.    Eyes:  Negative for pain, redness and itching.   Respiratory:  Negative for cough, shortness of breath and wheezing.    Cardiovascular:  Negative for chest pain, palpitations and leg swelling.   Gastrointestinal:  Negative for abdominal distention, abdominal pain, blood in stool, constipation, diarrhea, nausea, rectal pain and vomiting.   Endocrine: Negative for cold intolerance, heat intolerance and polyuria.   Genitourinary:  Negative for dysuria, flank pain, frequency and hematuria.   Musculoskeletal:  Negative for gait problem, joint swelling and neck pain.   Skin:  Negative for color change, rash and wound.   Allergic/Immunologic: Negative for environmental allergies and immunocompromised state.   Neurological:  Negative for dizziness, speech difficulty, weakness and numbness.   Psychiatric/Behavioral:  Negative for agitation, confusion and hallucinations.        OBJECTIVE:     Vital Signs (Most Recent)  Temp: 97.9 °F (36.6 °C) (04/28/25 1333)  Pulse: 64 (04/28/25 1333)  BP: 133/81 (04/28/25 1333)  SpO2: 98 % (04/28/25 1333)  5' 4" (1.626 m)  71.2 kg (157 lb 1.2 oz)     Physical Exam:  Physical Exam  Vitals reviewed.   Constitutional:       Appearance: She is well-developed.   HENT:      Head: Normocephalic and atraumatic.   Eyes:      Conjunctiva/sclera: Conjunctivae normal.   Neck:      Thyroid: No thyromegaly.   Cardiovascular:      Rate and Rhythm: Normal rate.   Pulmonary:      Effort: Pulmonary effort is normal. No respiratory distress.   Abdominal:      General: There is no distension.      Palpations: Abdomen is soft. There is no mass.      Tenderness: There is no abdominal tenderness.      Comments: Well-healed incisions   Musculoskeletal:    "      General: No tenderness. Normal range of motion.      Cervical back: Normal range of motion.   Skin:     General: Skin is warm and dry.      Capillary Refill: Capillary refill takes less than 2 seconds.      Findings: No rash.   Neurological:      Mental Status: She is alert and oriented to person, place, and time.         Laboratory  Lab Results   Component Value Date    WBC 4.25 06/24/2024    HGB 12.9 06/24/2024    HCT 41.3 06/24/2024     06/24/2024    CHOL 172 02/17/2025    TRIG 103 02/17/2025    HDL 63 02/17/2025    ALT 18 02/17/2025    AST 20 02/17/2025     02/17/2025    K 4.4 02/17/2025     02/17/2025    CREATININE 0.7 02/17/2025    BUN 14 02/17/2025    CO2 27 02/17/2025    TSH 2.159 06/24/2024    HGBA1C 5.4 06/24/2024     Component  Ref Range & Units (hover) 3 mo ago  (1/27/25) 6 mo ago  (10/28/24) 1 yr ago  (4/22/24) 1 yr ago  (1/18/24) 1 yr ago  (10/19/23) 1 yr ago  (7/7/23) 2 yr ago  (2/8/23)   CEA 2.1 1.9 CM 2.0 CM 1.9 CM 1.9 CM 1.8 CM 2.0 CM       Diagnostic Results:  Colonoscopy 01/2022: Reviewed    CT Jan 2025:  FINDINGS:  FINDINGS:  Chest:     Lungs: No acute finding.     Aorta: No aneurysm.     Pleural space:No pleural effusion or pneumothorax.     Heart: Normal size. No pericardial effusion.     Bones/joints: No acute osseous finding.     Other: No mediastinal or axillary lymphadenopathy.     Abdomen/pelvis:     Liver: Unremarkable.     Gallbladder: No calcified gallstones.     Bile Ducts: No evidence of dilated ducts.     Pancreas: No mass or peripancreatic fat stranding.     Spleen: Unremarkable.     Adrenals: Unremarkable.     Kidneys/ Ureters: Unremarkable.     Bladder: No evidence of wall thickening.     Reproductive organs: Hysterectomy     GI Tract/Mesentery: Low colonic resection and reanastomosis.  No evidence of recurrent disease.     Peritoneal Space: Unremarkable.     Retroperitoneum: No adenopathy.     Abdominal wall: Unremarkable.     Vasculature:  No aortic  aneurysm.     Bones: No acute fracture.     Impression:     No evidence of recurrent or metastatic disease in the chest, abdomen, or pelvis.    ASSESSMENT/PLAN:     60 y.o. 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

## 2025-04-29 ENCOUNTER — RESULTS FOLLOW-UP (OUTPATIENT)
Dept: SURGERY | Facility: HOSPITAL | Age: 60
End: 2025-04-29

## 2025-04-29 LAB — CARCINOEMBRYONIC ANTIGEN (OHS): 2.1 NG/ML

## 2025-05-08 ENCOUNTER — PATIENT MESSAGE (OUTPATIENT)
Dept: SURGERY | Facility: CLINIC | Age: 60
End: 2025-05-08
Payer: COMMERCIAL

## 2025-05-08 ENCOUNTER — ON-DEMAND VIRTUAL (OUTPATIENT)
Dept: URGENT CARE | Facility: CLINIC | Age: 60
End: 2025-05-08
Payer: COMMERCIAL

## 2025-05-08 ENCOUNTER — OFFICE VISIT (OUTPATIENT)
Dept: URGENT CARE | Facility: CLINIC | Age: 60
End: 2025-05-08
Payer: COMMERCIAL

## 2025-05-08 VITALS
OXYGEN SATURATION: 100 % | WEIGHT: 150 LBS | RESPIRATION RATE: 18 BRPM | SYSTOLIC BLOOD PRESSURE: 170 MMHG | DIASTOLIC BLOOD PRESSURE: 82 MMHG | BODY MASS INDEX: 25.61 KG/M2 | TEMPERATURE: 100 F | HEART RATE: 96 BPM | HEIGHT: 64 IN

## 2025-05-08 DIAGNOSIS — R31.9 HEMATURIA, UNSPECIFIED TYPE: ICD-10-CM

## 2025-05-08 DIAGNOSIS — R50.9 FEVER, UNSPECIFIED FEVER CAUSE: ICD-10-CM

## 2025-05-08 DIAGNOSIS — R50.9 FEVER, UNSPECIFIED FEVER CAUSE: Primary | ICD-10-CM

## 2025-05-08 DIAGNOSIS — R10.9 ABDOMINAL PAIN, UNSPECIFIED ABDOMINAL LOCATION: ICD-10-CM

## 2025-05-08 DIAGNOSIS — R10.32 LEFT LOWER QUADRANT ABDOMINAL PAIN: Primary | ICD-10-CM

## 2025-05-08 LAB
BILIRUBIN, UA POC OHS: NEGATIVE
BLOOD, UA POC OHS: ABNORMAL
CLARITY, UA POC OHS: ABNORMAL
COLOR, UA POC OHS: YELLOW
CTP QC/QA: YES
CTP QC/QA: YES
GLUCOSE, UA POC OHS: NEGATIVE
KETONES, UA POC OHS: >=160
LEUKOCYTES, UA POC OHS: NEGATIVE
NITRITE, UA POC OHS: NEGATIVE
PH, UA POC OHS: 6
POC MOLECULAR INFLUENZA A AGN: NEGATIVE
POC MOLECULAR INFLUENZA B AGN: NEGATIVE
PROTEIN, UA POC OHS: 30
SARS CORONAVIRUS 2 ANTIGEN: NEGATIVE
SPECIFIC GRAVITY, UA POC OHS: 1.02
UROBILINOGEN, UA POC OHS: 0.2

## 2025-05-08 PROCEDURE — 87502 INFLUENZA DNA AMP PROBE: CPT | Mod: QW,S$GLB,, | Performed by: PHYSICIAN ASSISTANT

## 2025-05-08 RX ORDER — TAMSULOSIN HYDROCHLORIDE 0.4 MG/1
0.4 CAPSULE ORAL DAILY
Qty: 14 CAPSULE | Refills: 0 | Status: SHIPPED | OUTPATIENT
Start: 2025-05-08 | End: 2025-05-22

## 2025-05-08 NOTE — PROGRESS NOTES
Subjective:      Patient ID: Jhoana Sierra is a 60 y.o. female.    Vitals:  vitals were not taken for this visit.     Chief Complaint: Abdominal Pain (fever)      Visit Type: TELE AUDIOVISUAL - This visit was conducted virtually based on  subjective information and limited objective exam    Present with the patient at the time of consultation: TELEMED PRESENT WITH PATIENT: None  [facility]  Two patient identifiers used to verify patient- saying out date of birth and full name.       Past Medical History:   Diagnosis Date    Adenocarcinoma of colon     Colon cancer 2020    Heart attack 01/22/2022    PONV (postoperative nausea and vomiting)      Past Surgical History:   Procedure Laterality Date    COLONOSCOPY N/A 08/25/2020    Procedure: COLONOSCOPY;  Surgeon: Rianna Dillon MD;  Location: [facility];  Service: Endoscopy;  Laterality: N/A;    COLONOSCOPY N/A 01/07/2022    Procedure: COLONOSCOPY;  Surgeon: Ziggy Hannah MD;  Location: [facility];  Service: General;  Laterality: N/A;    DIAGNOSTIC LAPAROSCOPY N/A 09/07/2022    Procedure: LAPAROSCOPY, DIAGNOSTIC;  Surgeon: Ziggy Hannah MD;  Location: [facility] OR;  Service: General;  Laterality: N/A;    FLEXIBLE SIGMOIDOSCOPY N/A 10/08/2020    Procedure: SIGMOIDOSCOPY, FLEXIBLE;  Surgeon: Ziggy Hannah MD;  Location: [facility] OR;  Service: General;  Laterality: N/A;    HYSTERECTOMY      INJECTION OF ANESTHETIC AGENT INTO TISSUE PLANE DEFINED BY TRANSVERSUS ABDOMINIS MUSCLE N/A 10/08/2020    Procedure: BLOCK, TRANSVERSUS ABDOMINIS PLANE;  Surgeon: Ziggy Hannah MD;  Location: [facility] OR;  Service: General;  Laterality: N/A;    LAPAROSCOPIC LYSIS OF ADHESIONS  09/07/2022    Procedure: LYSIS, ADHESIONS, LAPAROSCOPIC;  Surgeon: Ziggy Hannah MD;  Location: [facility] OR;  Service: General;;    LAPAROSCOPIC SIGMOIDECTOMY N/A 10/08/2020    Procedure: COLECTOMY, SIGMOID, LAPAROSCOPIC;  Surgeon: Ziggy Hannah MD;  Location: [facility] OR;   Service: General;  Laterality: N/A;  lower anterior resection  Proctosigmoidectomy    LAPAROTOMY N/A 10/08/2020    Procedure: LAPAROTOMY;  Surgeon: Ziggy Hannah MD;  Location: [facility] OR;  Service: General;  Laterality: N/A;    OVARIAN CYST REMOVAL      WISDOM TOOTH EXTRACTION      XI ROBOTIC HYSTERECTOMY, WITH SALPINGO-OOPHORECTOMY Bilateral 09/07/2022    Procedure: XI ROBOTIC HYSTERECTOMY,WITH SALPINGO-OOPHORECTOMY;  Surgeon: Bela Noel MD;  Location: [facility] OR;  Service: OB/GYN;  Laterality: Bilateral;     Review of patient's allergies indicates:  No Known Allergies  Medications Ordered Prior to Encounter[1]  Family History   Problem Relation Name Age of Onset    Fibromyalgia Sister      Diabetes Neg Hx      Heart disease Neg Hx             Ohs Peq Odvv Intake    5/8/2025  6:28 PM CDT - Filed by Patient   What is your current physical address in the event of a medical emergency? [id number] [facility] [address]   Are you able to take your vital signs? Yes   Systolic Blood Pressure: 162   Diastolic Blood Pressure: 75   Weight: 150   Height: 64   Pulse: 85   Temperature: 101.9   Respiration rate:    Pulse Oxygen:    Please attach any relevant images or files    Is your employer contracted with [facility]? No         59 yo female with c/o low grade fever and stabbing pain in side which started 4 days ago. She states the pain resolved. She states she thought possibly may be diverticultis she has never had a flare before but it showed on colonoscopy. She states temp 101 and when high nausea kicks in. She states she took ibuprofen. She states she has hx of colon cancer. She states stomach pain is tender to touch to middle left side. She states she is not having dysuria. She states urine. She denies diarrhea. She has no appetite.         Constitution: Positive for fever.   HENT: Negative.     Cardiovascular: Negative.    Respiratory: Negative.     Gastrointestinal: Negative.    Endocrine: negative.    Genitourinary: Negative.  Negative for frequency and urgency.   Musculoskeletal: Negative.    Skin: Negative.    Allergic/Immunologic: Negative.    Neurological: Negative.    Hematologic/Lymphatic: Negative.    Psychiatric/Behavioral: Negative.          Objective:   The physical exam was conducted virtually.    AAO x 3 ; no acute distress noted; appearance normal; mood and behavior normal; thought process normal  Head- normocephalic  Nose- appears normal, no discharge or erythema  Eyes- pupils appear normal in size, no drainage, no erythema  Ears- normal appearing; no discharge, no erythema  Mouth- appears normal  Oropharynx- no erythema, lesions  Lungs- breathing at a normal rate, no acute distress noted  Heart- no reports of tachycardia, palpitations, chest pain  Abdomen- non distended, non tender reported by patient  Skin- warm and dry, no erythema or edema noted by patient or visualized  Psych- as above; no si/hi      Assessment:     1. Fever, unspecified fever cause    2. Abdominal pain, unspecified abdominal location        Plan:     Advised in person visit for evaluation.     Thank you for choosing [facility]!    Our goal in the [facility] is to always provide outstanding medical care. You may receive a survey by mail or e-mail in the next week regarding your experience today. We would greatly appreciate you completing and returning the survey. Your feedback provides us with a way to recognize our staff who provide very good care, and it helps us learn how to improve when your experience was below our aspiration of excellence.         We appreciate you trusting us with your medical care. We hope you feel better soon. We will be happy to take care of you for all of your future medical needs.    You must understand that you've received an Urgent Care treatment only and that you may be released before all your medical problems are known or treated. You, the patient, will  arrange for follow up care as instructed.    Follow up with your PCP or specialty clinic as directed in the next 1-2 weeks if not improved or as needed.  You can call [phone] to schedule an appointment with the appropriate provider.    If your condition worsens we recommend that you receive another evaluation in person, with your primary care provider, urgent care or at the emergency room immediately or contact your primary medical clinics after hours call service to discuss your concerns.         Fever, unspecified fever cause    Abdominal pain, unspecified abdominal location                         [1]   Current Outpatient Medications on File Prior to Visit   Medication Sig Dispense Refill    aspirin 81 mg Cap 81 MG  .      metoprolol succinate (TOPROL-XL) 25 MG 24 hr tablet Take 1 tablet by mouth once daily 90 tablet 3    tiZANidine 4 mg Cap Take 1 capsule by mouth daily as needed (pain). 90 capsule 0     No current facility-administered medications on file prior to visit.

## 2025-05-09 ENCOUNTER — PATIENT MESSAGE (OUTPATIENT)
Dept: INTERNAL MEDICINE | Facility: CLINIC | Age: 60
End: 2025-05-09

## 2025-05-09 ENCOUNTER — OFFICE VISIT (OUTPATIENT)
Dept: INTERNAL MEDICINE | Facility: CLINIC | Age: 60
End: 2025-05-09
Payer: COMMERCIAL

## 2025-05-09 ENCOUNTER — TELEPHONE (OUTPATIENT)
Dept: INTERNAL MEDICINE | Facility: CLINIC | Age: 60
End: 2025-05-09

## 2025-05-09 ENCOUNTER — HOSPITAL ENCOUNTER (OUTPATIENT)
Dept: RADIOLOGY | Facility: HOSPITAL | Age: 60
Discharge: HOME | End: 2025-05-09
Attending: NURSE PRACTITIONER
Payer: COMMERCIAL

## 2025-05-09 ENCOUNTER — RESULTS FOLLOW-UP (OUTPATIENT)
Dept: INTERNAL MEDICINE | Facility: CLINIC | Age: 60
End: 2025-05-09

## 2025-05-09 VITALS
BODY MASS INDEX: 26.27 KG/M2 | TEMPERATURE: 99 F | WEIGHT: 153.88 LBS | HEART RATE: 89 BPM | SYSTOLIC BLOOD PRESSURE: 130 MMHG | HEIGHT: 64 IN | OXYGEN SATURATION: 98 % | DIASTOLIC BLOOD PRESSURE: 82 MMHG

## 2025-05-09 DIAGNOSIS — R50.9 FEVER, UNSPECIFIED FEVER CAUSE: Primary | ICD-10-CM

## 2025-05-09 DIAGNOSIS — R10.9 ABDOMINAL PAIN, UNSPECIFIED ABDOMINAL LOCATION: ICD-10-CM

## 2025-05-09 DIAGNOSIS — N20.0 NEPHROLITHIASIS: ICD-10-CM

## 2025-05-09 LAB
BILIRUB SERPL-MCNC: NORMAL MG/DL
BLOOD URINE, POC: NORMAL
CLARITY, POC UA: CLEAR
COLOR, POC UA: COLORLESS
GLUCOSE UR QL STRIP: NORMAL
KETONES UR QL STRIP: NORMAL
LEUKOCYTE ESTERASE URINE, POC: NORMAL
NITRITE, POC UA: NORMAL
PH, POC UA: 7
PROTEIN, POC: NORMAL
SPECIFIC GRAVITY, POC UA: 1
UROBILINOGEN, POC UA: 0.2

## 2025-05-09 PROCEDURE — 99999 PR PBB SHADOW E&M-EST. PATIENT-LVL IV: CPT | Mod: PBBFAC,,, | Performed by: NURSE PRACTITIONER

## 2025-05-09 PROCEDURE — 87086 URINE CULTURE/COLONY COUNT: CPT | Performed by: NURSE PRACTITIONER

## 2025-05-09 PROCEDURE — 74176 CT ABD & PELVIS W/O CONTRAST: CPT | Mod: 26,,, | Performed by: RADIOLOGY

## 2025-05-09 PROCEDURE — 74176 CT ABD & PELVIS W/O CONTRAST: CPT | Mod: TC,PN

## 2025-05-09 RX ORDER — TRAMADOL HYDROCHLORIDE 50 MG/1
50 TABLET ORAL EVERY 8 HOURS PRN
Qty: 20 TABLET | Refills: 0 | Status: SHIPPED | OUTPATIENT
Start: 2025-05-09

## 2025-05-09 RX ORDER — CIPROFLOXACIN 500 MG/1
500 TABLET ORAL EVERY 12 HOURS
Qty: 14 TABLET | Refills: 0 | Status: SHIPPED | OUTPATIENT
Start: 2025-05-09 | End: 2025-05-16

## 2025-05-09 NOTE — PROGRESS NOTES
"Subjective:      Patient ID: Jhoana Sierra is a 60 y.o. female.    Vitals:  height is 5' 4" (1.626 m) and weight is 68 kg (150 lb). Her oral temperature is 99.7 °F (37.6 °C). Her blood pressure is 170/82 (abnormal) and her pulse is 96. Her respiration is 18 and oxygen saturation is 100%.     Chief Complaint: Fever    Patient presents with headache, LLQ abdominal pain, mild left side flank pain, and fever (Tmax 102.5). Symptoms started 4 days. Pt took Tylenol 1000 mg @ 3:30pm. Patient has a history of colon cancer and doesn't have her sigmoid colon.  She has diverticulosis found on colonoscopy.  She reports the abdominal pain has eased up.  She is prone to UTIs and would like that to be ruled out.  On 10/30/23, she had CT scan and was told that she has a 3mm stone in pole of left kidney.    Fever   This is a new problem. Associated symptoms include abdominal pain, ear pain and headaches. Pertinent negatives include no congestion, coughing, diarrhea, muscle aches, nausea, not playful when afebrile, rash, sleepiness, sore throat, urinary pain, vomiting or wheezing. The treatment provided mild relief.       Constitution: Positive for fever.   HENT:  Positive for ear pain. Negative for congestion and sore throat.    Respiratory:  Negative for cough and wheezing.    Gastrointestinal:  Positive for abdominal pain. Negative for nausea, vomiting and diarrhea.   Skin:  Negative for rash.   Neurological:  Positive for headaches.      Objective:     Physical Exam   Constitutional: She is oriented to person, place, and time. She appears well-developed.   HENT:   Head: Normocephalic and atraumatic.   Ears:   Right Ear: External ear normal.   Left Ear: External ear normal.   Nose: Nose normal.   Mouth/Throat: Oropharynx is clear and moist.   Eyes: Conjunctivae, EOM and lids are normal.   Neck: Trachea normal and phonation normal. Neck supple.   Cardiovascular: Normal rate.   Pulmonary/Chest: Effort normal.   Abdominal: Soft. There " is abdominal tenderness in the left lower quadrant.      Comments: Mild pain with deep palpation     Musculoskeletal: Normal range of motion.         General: Normal range of motion.   Neurological: She is alert and oriented to person, place, and time.   Skin: Skin is warm, dry and intact.   Psychiatric: Her speech is normal and behavior is normal. Judgment and thought content normal.   Nursing note and vitals reviewed.      Assessment:     1. Left lower quadrant abdominal pain    2. Fever, unspecified fever cause    3. Hematuria, unspecified type      Results for orders placed or performed in visit on 05/08/25   POCT Influenza A/B MOLECULAR    Collection Time: 05/08/25  7:33 PM   Result Value Ref Range    POC Molecular Influenza A Ag Negative Negative    POC Molecular Influenza B Ag Negative Negative     Acceptable Yes    SARS Coronavirus 2 Antigen, POCT Manual Read    Collection Time: 05/08/25  7:34 PM   Result Value Ref Range    SARS Coronavirus 2 Antigen Negative Negative, Presumptive Negative     Acceptable Yes    POCT Urinalysis(Instrument)    Collection Time: 05/08/25  7:45 PM   Result Value Ref Range    Color, POC UA Yellow Yellow, Straw, Colorless    Clarity, POC UA Slight Cloudy (A) Clear    Glucose, POC UA Negative Negative    Bilirubin, POC UA Negative Negative    Ketones, POC UA >=160 (A) Negative    Spec Grav POC UA 1.020 1.005 - 1.030    Blood, POC UA Large (A) Negative    pH, POC UA 6.0 5.0 - 8.0    Protein, POC UA 30 (A) Negative    Urobilinogen, POC UA 0.2 <=1.0    Nitrite, POC UA Negative Negative    WBC, POC UA Negative Negative       Patient has history of stone in left kidney. Due to blood in urine and stone history, I will prescribe her Flomax.    Plan:   VSS. Patient non-toxic appearing. Discussed medication being prescribed.  Advised patient to follow up with PCP as needed and go the ED with worsening symptoms.  Patient verbalized understanding, agrees with the  plan, and is comfortable with discharge.      Left lower quadrant abdominal pain  -     POCT Urinalysis(Instrument)    Fever, unspecified fever cause  -     POCT Influenza A/B MOLECULAR  -     SARS Coronavirus 2 Antigen, POCT Manual Read  -     POCT Urinalysis(Instrument)    Hematuria, unspecified type  -     tamsulosin (FLOMAX) 0.4 mg Cap; Take 1 capsule (0.4 mg total) by mouth once daily. for 14 days  Dispense: 14 capsule; Refill: 0      Medical Decision Making:   Clinical Tests:   Lab Tests: Reviewed and Ordered  The following lab test(s) were unremarkable: Urinalysis       <> Summary of Lab: COVID negative  Flu negative

## 2025-05-09 NOTE — PROGRESS NOTES
Subjective:       Patient ID: Jhoana Sierra is a 60 y.o. female.    CHIEF COMPLAINT:  - Ms. Sierra presents with fever, abdominal pain, and concern for possible kidney stones or diverticulitis.    HPI:  Ms. Sierra reports symptom onset 5 days ago on Sunday evening with a low-grade fever and acute pain in her lower right abdomen. She also had lower back pain, initially attributed to yard work but now suspected to be related to her current condition. Ms. Sierra took Tylenol, which provided partial symptom relief. By Tuesday, her fever increased to 100-101°F. On Wednesday, she improved during the day, but by evening her fever spiked to 102.5°F, which improved with ibuprofen. Thursday was the most severe day, with an intense headache attributed to the fever. Today, patient reports a low-grade fever of 100.9°F as of 11:30 AM.    She describes her current abdominal pain as minimal but noticeable with palpation, comparing it to her past kidney stone experience and stating that the current pain is less severe. Ms. Sierra had a kidney stone 30 years ago and states that pain was more intense than childbirth.    She reports nausea when her fever is high but no diarrhea. Her appetite has been poor, and she feels unwell. She has been taking 600mg of ibuprofen for pain relief, which has been effective.    Ms. Sierra mentions having had recent UTIs and annual CTs for colon cancer screening. During a colonoscopy in February, diverticulosis was noted. She also reports eating blackberries over the weekend, which she associates with her grandmother's history of diverticulitis.    Ms. Sierra was evaluated at an urgent care facility on Wednesday, where she had significant discomfort and requested to lie down during the visit. She was diagnosed with kidney stones and prescribed flomax.     Her pain has improved however she continues to run fevers- 100.9 tmax today.     She denies any respiratory symptoms, runny nose, or congestion.       "  /82 (Patient Position: Sitting)   Pulse 89   Temp 98.9 °F (37.2 °C) (Tympanic)   Ht 5' 4" (1.626 m)   Wt 69.8 kg (153 lb 14.1 oz)   LMP 08/25/2017   SpO2 98%   BMI 26.41 kg/m²     Review of Systems   Constitutional:  Positive for activity change, appetite change and fever.   Gastrointestinal:  Positive for abdominal pain and nausea. Negative for diarrhea and vomiting.   Genitourinary:  Positive for flank pain and frequency.   Musculoskeletal:  Positive for back pain.       Objective:      Physical Exam  Vitals and nursing note reviewed.   Constitutional:       General: She is not in acute distress.     Appearance: Normal appearance. She is well-developed. She is not diaphoretic.   HENT:      Head: Normocephalic and atraumatic.   Cardiovascular:      Rate and Rhythm: Normal rate and regular rhythm.      Heart sounds: Normal heart sounds. No murmur heard.  Pulmonary:      Effort: Pulmonary effort is normal. No respiratory distress.      Breath sounds: Normal breath sounds.   Abdominal:      General: Bowel sounds are increased.      Tenderness: There is abdominal tenderness in the left lower quadrant. There is no right CVA tenderness or left CVA tenderness.   Musculoskeletal:         General: Normal range of motion.   Skin:     General: Skin is warm and dry.      Findings: No rash.   Neurological:      Mental Status: She is alert.   Psychiatric:         Mood and Affect: Mood normal.         Behavior: Behavior normal. Behavior is cooperative.         Thought Content: Thought content normal.         Judgment: Judgment normal.         Assessment and Plan        Jhoana was seen today for nephrolithiasis.    Diagnoses and all orders for this visit:    Fever, unspecified fever cause  -     Urine Culture High Risk; Future  -     Urine Culture High Risk  -     ciprofloxacin HCl (CIPRO) 500 MG tablet; Take 1 tablet (500 mg total) by mouth every 12 (twelve) hours. for 7 days    Nephrolithiasis  -     POCT URINE " DIPSTICK WITHOUT MICROSCOPE  -     ciprofloxacin HCl (CIPRO) 500 MG tablet; Take 1 tablet (500 mg total) by mouth every 12 (twelve) hours. for 7 days  -     traMADoL (ULTRAM) 50 mg tablet; Take 1 tablet (50 mg total) by mouth every 8 (eight) hours as needed for Pain.    Abdominal pain, unspecified abdominal location  -     CT Renal Stone Study ABD Pelvis WO; Future    BMI 26.0-26.9,adult      There are no Patient Instructions on file for this visit.      1. Fever, unspecified fever cause    2. Nephrolithiasis    3. Abdominal pain, unspecified abdominal location    4. BMI 26.0-26.9,adult        Assessment & Plan    ## ABDOMINAL PAIN:  - Discussed diverticulitis as a possible cause of symptoms, considering patient's past diagnosis and family history.  - Started Ciprofloxacin for potential GI infections, given the diagnostic uncertainty and approaching weekend.  - Ordered CT Abdomen at [facility] for 3:00 PM to clarify diagnosis and guide treatment.    ## KIDNEY STONES:  - Discussed kidney stones as a potential cause of symptoms, though patient reports current pain is not as severe as previous kidney stone experiences.  - Ms. Fuentes has history of annual kidney stones, but none noted in recent CTs.  - Ordered CT Abdomen to clarify diagnosis and rule out kidney stones.  - Evaluated blood in urine in context of kidney stone history, considering other possible causes for hematuria.    ## FEVER:  - Ms. Fuentes reports fluctuating fever reaching up to 102.5°F with associated headache and nausea.  - Fever is concerning and atypical for both kidney stones and diverticulitis, raising consideration for possible viral infection.  - Started antibiotics to address fever.  - Monitoring condition closely, particularly fever response to antibiotics.  - Will assess need for further workup (labs) if fever persists.  - Instructed patient to contact office or go to ER if still running high fevers after 24 hours of antibiotics.    ## URINARY  TRACT INFECTION:  - Ms. Fuentes reports recent UTIs, though current urine dipstick shows no bacteria.  - Started Ciprofloxacin for potential urinary infections given the uncertainty of diagnosis and approaching weekend.  - Ordered urine culture despite negative dipstick to definitively rule out UTI.    ## HEMATURIA:  - Ms. Fuentes reports blood in urine  - CT ordered to check for renal sones     ## FOLLOW-UP AND ADDITIONAL INSTRUCTIONS:  - Scheduled follow-up next week for urine culture results.  - Advised patient not to take tizanidine (muscle relaxer) with Cipro due to potential interactions.  - Explained rationale for broad-spectrum antibiotic use.  - Ms. Fuentes instructed to stay hydrated.  - Provider will call patient after hours with CT results.  - ER should symptoms worsen in anyway over the weekend.           This note was generated with the assistance of ambient listening technology. Verbal consent was obtained by the patient and accompanying visitor(s) for the recording of patient appointment to facilitate this note. I attest to having reviewed and edited the generated note for accuracy, though some syntax or spelling errors may persist. Please contact the author of this note for any clarification.

## 2025-05-09 NOTE — TELEPHONE ENCOUNTER
----- Message from [employer] sent at 5/9/2025  3:59 PM CDT -----  .Type: Patient Call BackWho called:patientWhat is the request in detail:   calling concerning missed call. Please call backCan the clinic reply by [facility]?   Would the patient rather a call back or a response via [facility] call back number:  .[phone]

## 2025-06-13 DIAGNOSIS — I21.02 ST ELEVATION MYOCARDIAL INFARCTION INVOLVING LEFT ANTERIOR DESCENDING (LAD) CORONARY ARTERY: ICD-10-CM

## 2025-06-13 RX ORDER — METOPROLOL SUCCINATE 25 MG/1
25 TABLET, EXTENDED RELEASE ORAL
Qty: 90 TABLET | Refills: 3 | Status: SHIPPED | OUTPATIENT
Start: 2025-06-13

## 2025-06-24 ENCOUNTER — OFFICE VISIT (OUTPATIENT)
Dept: INTERNAL MEDICINE | Facility: CLINIC | Age: 60
End: 2025-06-24
Payer: COMMERCIAL

## 2025-06-24 ENCOUNTER — RESULTS FOLLOW-UP (OUTPATIENT)
Dept: INTERNAL MEDICINE | Facility: CLINIC | Age: 60
End: 2025-06-24

## 2025-06-24 ENCOUNTER — LAB VISIT (OUTPATIENT)
Dept: LAB | Facility: HOSPITAL | Age: 60
End: 2025-06-24
Attending: FAMILY MEDICINE
Payer: COMMERCIAL

## 2025-06-24 VITALS
WEIGHT: 157.88 LBS | HEART RATE: 70 BPM | OXYGEN SATURATION: 97 % | TEMPERATURE: 98 F | BODY MASS INDEX: 26.95 KG/M2 | DIASTOLIC BLOOD PRESSURE: 80 MMHG | SYSTOLIC BLOOD PRESSURE: 122 MMHG | HEIGHT: 64 IN

## 2025-06-24 DIAGNOSIS — Z00.00 ROUTINE GENERAL MEDICAL EXAMINATION AT A HEALTH CARE FACILITY: Primary | ICD-10-CM

## 2025-06-24 DIAGNOSIS — M62.838 NIGHT MUSCLE SPASMS: ICD-10-CM

## 2025-06-24 DIAGNOSIS — R31.9 HEMATURIA, UNSPECIFIED TYPE: ICD-10-CM

## 2025-06-24 DIAGNOSIS — Z00.00 ROUTINE GENERAL MEDICAL EXAMINATION AT A HEALTH CARE FACILITY: ICD-10-CM

## 2025-06-24 DIAGNOSIS — Z23 NEED FOR VACCINATION: ICD-10-CM

## 2025-06-24 DIAGNOSIS — N20.0 KIDNEY STONE: ICD-10-CM

## 2025-06-24 LAB
ABSOLUTE EOSINOPHIL (OHS): 0.03 K/UL
ABSOLUTE MONOCYTE (OHS): 0.19 K/UL (ref 0.3–1)
ABSOLUTE NEUTROPHIL COUNT (OHS): 1.88 K/UL (ref 1.8–7.7)
ALBUMIN SERPL BCP-MCNC: 4.5 G/DL (ref 3.5–5.2)
ALP SERPL-CCNC: 76 UNIT/L (ref 40–150)
ALT SERPL W/O P-5'-P-CCNC: 31 UNIT/L (ref 10–44)
ANION GAP (OHS): 11 MMOL/L (ref 8–16)
AST SERPL-CCNC: 23 UNIT/L (ref 11–45)
BASOPHILS # BLD AUTO: 0.02 K/UL
BASOPHILS NFR BLD AUTO: 0.6 %
BILIRUB SERPL-MCNC: 0.6 MG/DL (ref 0.1–1)
BILIRUB SERPL-MCNC: NORMAL MG/DL
BLOOD URINE, POC: NORMAL
BUN SERPL-MCNC: 17 MG/DL (ref 6–20)
CALCIUM SERPL-MCNC: 9.1 MG/DL (ref 8.7–10.5)
CHLORIDE SERPL-SCNC: 102 MMOL/L (ref 95–110)
CHOLEST SERPL-MCNC: 216 MG/DL (ref 120–199)
CHOLEST/HDLC SERPL: 3.1 {RATIO} (ref 2–5)
CLARITY, POC UA: CLEAR
CO2 SERPL-SCNC: 26 MMOL/L (ref 23–29)
COLOR, POC UA: NORMAL
CREAT SERPL-MCNC: 0.8 MG/DL (ref 0.5–1.4)
EAG (OHS): 108 MG/DL (ref 68–131)
ERYTHROCYTE [DISTWIDTH] IN BLOOD BY AUTOMATED COUNT: 12.8 % (ref 11.5–14.5)
GFR SERPLBLD CREATININE-BSD FMLA CKD-EPI: >60 ML/MIN/1.73/M2
GLUCOSE SERPL-MCNC: 95 MG/DL (ref 70–110)
GLUCOSE UR QL STRIP: NORMAL
HBA1C MFR BLD: 5.4 % (ref 4–5.6)
HCT VFR BLD AUTO: 38.3 % (ref 37–48.5)
HDLC SERPL-MCNC: 69 MG/DL (ref 40–75)
HDLC SERPL: 31.9 % (ref 20–50)
HGB BLD-MCNC: 12.2 GM/DL (ref 12–16)
IMM GRANULOCYTES # BLD AUTO: 0.01 K/UL (ref 0–0.04)
IMM GRANULOCYTES NFR BLD AUTO: 0.3 % (ref 0–0.5)
KETONES UR QL STRIP: NORMAL
LDLC SERPL CALC-MCNC: 123.4 MG/DL (ref 63–159)
LEUKOCYTE ESTERASE URINE, POC: NORMAL
LYMPHOCYTES # BLD AUTO: 0.97 K/UL (ref 1–4.8)
MCH RBC QN AUTO: 30.3 PG (ref 27–31)
MCHC RBC AUTO-ENTMCNC: 31.9 G/DL (ref 32–36)
MCV RBC AUTO: 95 FL (ref 82–98)
NITRITE, POC UA: NORMAL
NONHDLC SERPL-MCNC: 147 MG/DL
NUCLEATED RBC (/100WBC) (OHS): 0 /100 WBC
PH, POC UA: 6.5
PLATELET # BLD AUTO: 200 K/UL (ref 150–450)
PMV BLD AUTO: 10.1 FL (ref 9.2–12.9)
POTASSIUM SERPL-SCNC: 4.2 MMOL/L (ref 3.5–5.1)
PROT SERPL-MCNC: 7.3 GM/DL (ref 6–8.4)
PROTEIN, POC: NORMAL
RBC # BLD AUTO: 4.02 M/UL (ref 4–5.4)
RELATIVE EOSINOPHIL (OHS): 1 %
RELATIVE LYMPHOCYTE (OHS): 31.3 % (ref 18–48)
RELATIVE MONOCYTE (OHS): 6.1 % (ref 4–15)
RELATIVE NEUTROPHIL (OHS): 60.7 % (ref 38–73)
SODIUM SERPL-SCNC: 139 MMOL/L (ref 136–145)
SPECIFIC GRAVITY, POC UA: 1.01
T4 FREE SERPL-MCNC: 0.91 NG/DL (ref 0.71–1.51)
TRIGL SERPL-MCNC: 118 MG/DL (ref 30–150)
TSH SERPL-ACNC: 2.05 UIU/ML (ref 0.4–4)
UROBILINOGEN, POC UA: 0.2
WBC # BLD AUTO: 3.1 K/UL (ref 3.9–12.7)

## 2025-06-24 PROCEDURE — 1160F RVW MEDS BY RX/DR IN RCRD: CPT | Mod: CPTII,S$GLB,, | Performed by: FAMILY MEDICINE

## 2025-06-24 PROCEDURE — 3008F BODY MASS INDEX DOCD: CPT | Mod: CPTII,S$GLB,, | Performed by: FAMILY MEDICINE

## 2025-06-24 PROCEDURE — 3074F SYST BP LT 130 MM HG: CPT | Mod: CPTII,S$GLB,, | Performed by: FAMILY MEDICINE

## 2025-06-24 PROCEDURE — 99999 PR PBB SHADOW E&M-EST. PATIENT-LVL IV: CPT | Mod: PBBFAC,,, | Performed by: FAMILY MEDICINE

## 2025-06-24 PROCEDURE — 99396 PREV VISIT EST AGE 40-64: CPT | Mod: 25,S$GLB,, | Performed by: FAMILY MEDICINE

## 2025-06-24 PROCEDURE — 81002 URINALYSIS NONAUTO W/O SCOPE: CPT | Mod: S$GLB,,, | Performed by: FAMILY MEDICINE

## 2025-06-24 PROCEDURE — 3079F DIAST BP 80-89 MM HG: CPT | Mod: CPTII,S$GLB,, | Performed by: FAMILY MEDICINE

## 2025-06-24 PROCEDURE — 90471 IMMUNIZATION ADMIN: CPT | Mod: S$GLB,,, | Performed by: FAMILY MEDICINE

## 2025-06-24 PROCEDURE — 84443 ASSAY THYROID STIM HORMONE: CPT

## 2025-06-24 PROCEDURE — 83036 HEMOGLOBIN GLYCOSYLATED A1C: CPT

## 2025-06-24 PROCEDURE — 84439 ASSAY OF FREE THYROXINE: CPT

## 2025-06-24 PROCEDURE — 82465 ASSAY BLD/SERUM CHOLESTEROL: CPT

## 2025-06-24 PROCEDURE — 85025 COMPLETE CBC W/AUTO DIFF WBC: CPT

## 2025-06-24 PROCEDURE — 1159F MED LIST DOCD IN RCRD: CPT | Mod: CPTII,S$GLB,, | Performed by: FAMILY MEDICINE

## 2025-06-24 PROCEDURE — 36415 COLL VENOUS BLD VENIPUNCTURE: CPT | Mod: PO

## 2025-06-24 PROCEDURE — 82040 ASSAY OF SERUM ALBUMIN: CPT

## 2025-06-24 PROCEDURE — 87086 URINE CULTURE/COLONY COUNT: CPT | Performed by: FAMILY MEDICINE

## 2025-06-24 PROCEDURE — 90677 PCV20 VACCINE IM: CPT | Mod: S$GLB,,, | Performed by: FAMILY MEDICINE

## 2025-06-24 RX ORDER — TIZANIDINE HYDROCHLORIDE 4 MG/1
1 CAPSULE, GELATIN COATED ORAL DAILY PRN
Qty: 90 CAPSULE | Refills: 0 | Status: SHIPPED | OUTPATIENT
Start: 2025-06-24

## 2025-06-24 NOTE — PROGRESS NOTES
Subjective     Patient ID: Jhoana Sierra is a 60 y.o. female.    Chief Complaint: Annual Exam    History of Present Illness    PRESENTATION:  Jhoana presents for annual visit.    HPI:  Jhoana reports having had an infection about a month ago, related to a kidney stone. She was referred to a urologist but never received a response. She is uncertain if she passed the stone, noting that it was not as severe as her previous kidney stone episode 30 years ago. She reports intermittent burning sensation during urination. Her previous kidney stone occurred in her early 30s during the 1990s, was calcium-related, and she was taking calcium supplements at that time. She admits to inadequate water intake. The current kidney stone measures 4.5 mm, is not obstructing, and has been visible on her CTs for the past 5 years, which were performed due to her history of colon cancer.    She reports slight constipation and notes that her bowel movements feel different since her colon cancer. She has difficulty sensing the need for bowel movements. She follows up with Dr. Hannah for her colon cancer and had a clear colonoscopy in February, with instructions to return in 5 years. Her colon cancer was initially discovered through a routine Cologuard test, which came back positive. She had no symptoms prior to the diagnosis but started having abdominal pain after the positive Cologuard result.    She is evaluated regularly by a retina specialist and an ophthalmologist for eye care. She sees a dentist every 6 months and is following up with her gynecologist, Dr. Cohen, next month.    She denies having problems with constipation or diarrhea usually.      MEDICAL HISTORY:  Jhoana has a history of kidney stones. She currently has a 4.5 mm kidney stone that is not obstructing. She was diagnosed with colon cancer after a positive Cologuard test, which was caught early. She also had a previous kidney stone in her early 30s, during the 1990s.    TEST  RESULTS:  She had her calcium level checked in February with Dr. Turcios, which was normal. A urinalysis conducted a month ago showed an infection, possibly related to her kidney stone. Blood work was also performed in February.    IMAGING:  Multiple CTs over the past 5 years have shown the presence of a 4.5 mm kidney stone, which is not blocking anything. A colonoscopy was performed in February, yielding clear results.    SOCIAL HISTORY:  Occupation: Retired    Marital status:       ROS:  General: -fever, -chills, -fatigue, -weight gain, -weight loss  Eyes: -vision changes, -redness, -discharge, +blurry vision, +loss of vision  ENT: -ear pain, -nasal congestion, -sore throat  Cardiovascular: -chest pain, -palpitations, -lower extremity edema  Respiratory: -cough, -shortness of breath  Gastrointestinal: -abdominal pain, -nausea, -vomiting, -diarrhea, +constipation, -blood in stool, +change in bowel habits  Genitourinary: -dysuria, -hematuria, -frequency, +painful urination  Musculoskeletal: -joint pain, -muscle pain  Skin: -rash, -lesion  Neurological: -headache, -dizziness, -numbness, -tingling  Psychiatric: -anxiety, -depression, -sleep difficulty            Objective     Physical Exam  Vitals and nursing note reviewed.   Constitutional:       Appearance: Normal appearance. She is normal weight.   HENT:      Head: Normocephalic and atraumatic.      Right Ear: Tympanic membrane, ear canal and external ear normal.      Left Ear: Tympanic membrane, ear canal and external ear normal.      Nose: Nose normal.      Mouth/Throat:      Mouth: Mucous membranes are moist.      Pharynx: Oropharynx is clear.   Eyes:      Extraocular Movements: Extraocular movements intact.      Conjunctiva/sclera: Conjunctivae normal.   Cardiovascular:      Rate and Rhythm: Normal rate and regular rhythm.      Pulses: Normal pulses.      Heart sounds: Normal heart sounds.   Pulmonary:      Effort: Pulmonary effort is normal.      Breath  sounds: Normal breath sounds.   Abdominal:      General: Abdomen is flat. Bowel sounds are normal.      Palpations: Abdomen is soft.   Musculoskeletal:         General: Normal range of motion.      Cervical back: Normal range of motion and neck supple.      Right lower leg: No edema.      Left lower leg: No edema.   Skin:     General: Skin is warm and dry.   Neurological:      General: No focal deficit present.      Mental Status: She is alert and oriented to person, place, and time.   Psychiatric:         Mood and Affect: Mood normal.         Behavior: Behavior normal.                Assessment and Plan     1. Routine general medical examination at a health care facility  Comments:  reviewed age appropriate screenings and immunizations  Orders:  -     T4, Free; Future; Expected date: 06/24/2025  -     TSH; Future; Expected date: 06/24/2025  -     Hemoglobin A1C; Future; Expected date: 06/24/2025  -     Lipid Panel; Future; Expected date: 06/24/2025  -     Comprehensive Metabolic Panel; Future; Expected date: 06/24/2025  -     CBC Auto Differential; Future; Expected date: 06/24/2025    2. Kidney stone  -     Ambulatory referral/consult to Urology; Future; Expected date: 07/01/2025  -     POCT URINE DIPSTICK WITHOUT MICROSCOPE    3. Need for vaccination  -     pneumoc 20-es conj-dip cr(PF) (PREVNAR-20 (PF)) injection Syrg 0.5 mL    4. Night muscle spasms  Comments:  stable cont prn tizanidine  Orders:  -     tiZANidine 4 mg Cap; Take 1 capsule by mouth daily as needed (pain).  Dispense: 90 capsule; Refill: 0    5. Hematuria, unspecified type  Comments:  positive on todays urine check, check urine culture  Orders:  -     Urine Culture High Risk        Assessment & Plan    Reviewed history of recent UTI, likely related to 4.5 mm kidney stone.  Considered need for urinalysis due to report of intermittent burning sensation.  Noted history of colon cancer, now in remission for nearly 5 years.  Discussed bone scan timing,  deferring to gynecologist to avoid conflicting recommendations.    MEDICATIONS:   Refilled Tizanidine and sent to [facility] in [facility].    ORDERS:   Ordered urinalysis.   Ordered comprehensive blood panel including CBC, kidney and liver function tests, electrolytes, cholesterol, diabetes screening, and thyroid function.   Ordered pneumonia vaccine to be administered in office.    REFERRALS:   Referred to Dr. Page (urologist) for kidney stone follow-up.              Follow up in about 1 year (around 6/24/2026) for Annual Exam.    This note was generated with the assistance of ambient listening technology. Verbal consent was obtained by the patient and accompanying visitor(s) for the recording of patient appointment to facilitate this note. I attest to having reviewed and edited the generated note for accuracy, though some syntax or spelling errors may persist. Please contact the author of this note for any clarification.

## 2025-06-26 LAB — BACTERIA UR CULT: ABNORMAL

## 2025-07-16 ENCOUNTER — HOSPITAL ENCOUNTER (OUTPATIENT)
Dept: CARDIOLOGY | Facility: HOSPITAL | Age: 60
Discharge: HOME | End: 2025-07-16
Attending: INTERNAL MEDICINE
Payer: COMMERCIAL

## 2025-07-16 ENCOUNTER — OFFICE VISIT (OUTPATIENT)
Dept: CARDIOLOGY | Facility: CLINIC | Age: 60
End: 2025-07-16
Payer: COMMERCIAL

## 2025-07-16 VITALS
SYSTOLIC BLOOD PRESSURE: 110 MMHG | HEIGHT: 64 IN | OXYGEN SATURATION: 98 % | BODY MASS INDEX: 27.18 KG/M2 | DIASTOLIC BLOOD PRESSURE: 68 MMHG | WEIGHT: 159.19 LBS | HEART RATE: 68 BPM

## 2025-07-16 DIAGNOSIS — I21.02 ST ELEVATION MYOCARDIAL INFARCTION INVOLVING LEFT ANTERIOR DESCENDING (LAD) CORONARY ARTERY: ICD-10-CM

## 2025-07-16 DIAGNOSIS — R94.31 ABNORMAL EKG: ICD-10-CM

## 2025-07-16 DIAGNOSIS — R79.89 ELEVATED LFTS: ICD-10-CM

## 2025-07-16 DIAGNOSIS — C18.9 ADENOCARCINOMA OF COLON: ICD-10-CM

## 2025-07-16 DIAGNOSIS — Z88.8 ALLERGY TO STATIN MEDICATION: ICD-10-CM

## 2025-07-16 DIAGNOSIS — I25.42 SPONTANEOUS DISSECTION OF CORONARY ARTERY: Primary | ICD-10-CM

## 2025-07-16 DIAGNOSIS — C18.9 COLON ADENOCARCINOMA: ICD-10-CM

## 2025-07-16 PROCEDURE — 3078F DIAST BP <80 MM HG: CPT | Mod: CPTII,S$GLB,, | Performed by: INTERNAL MEDICINE

## 2025-07-16 PROCEDURE — 93005 ELECTROCARDIOGRAM TRACING: CPT

## 2025-07-16 PROCEDURE — 1160F RVW MEDS BY RX/DR IN RCRD: CPT | Mod: CPTII,S$GLB,, | Performed by: INTERNAL MEDICINE

## 2025-07-16 PROCEDURE — 3044F HG A1C LEVEL LT 7.0%: CPT | Mod: CPTII,S$GLB,, | Performed by: INTERNAL MEDICINE

## 2025-07-16 PROCEDURE — 3008F BODY MASS INDEX DOCD: CPT | Mod: CPTII,S$GLB,, | Performed by: INTERNAL MEDICINE

## 2025-07-16 PROCEDURE — 99214 OFFICE O/P EST MOD 30 MIN: CPT | Mod: 25,S$GLB,, | Performed by: INTERNAL MEDICINE

## 2025-07-16 PROCEDURE — 3074F SYST BP LT 130 MM HG: CPT | Mod: CPTII,S$GLB,, | Performed by: INTERNAL MEDICINE

## 2025-07-16 PROCEDURE — 99999 PR PBB SHADOW E&M-EST. PATIENT-LVL III: CPT | Mod: PBBFAC,,, | Performed by: INTERNAL MEDICINE

## 2025-07-16 PROCEDURE — 1159F MED LIST DOCD IN RCRD: CPT | Mod: CPTII,S$GLB,, | Performed by: INTERNAL MEDICINE

## 2025-07-16 PROCEDURE — 93010 ELECTROCARDIOGRAM REPORT: CPT | Mod: ,,, | Performed by: INTERNAL MEDICINE

## 2025-07-16 RX ORDER — ROSUVASTATIN CALCIUM 20 MG/1
20 TABLET, COATED ORAL DAILY
Qty: 30 TABLET | Refills: 11 | Status: SHIPPED | OUTPATIENT
Start: 2025-07-16

## 2025-07-16 NOTE — PROGRESS NOTES
Subjective:   Patient ID:  Jhoana Sierra is a 60 y.o. female who presents for follow up of No chief complaint on file.      HPI  2/14/2022 DR COOK  58 yo female, came in for post STEMI/ coronary dissection f/u  PMH h/o Colon cancer emoval parts in  no chemo and XRT  and in remission, fibromyalgia  01/24/2022 admitted to [facility] for STEMI. Emergent cath showed spontaneous mild to distal LAD dissection, normal EF and filling pressure. ascending TAA 4 cm.  Chest CTA showed no PE and TAA  Brain CT negative.   ABD CTA showed slight beaded appurtenance of shelbi l A. Typical for fibromuscular dysplasia  Vascularis workup negative for NIRANJAN CRP KHANG and ANCA.   D/c with ASA lipitor and metoprolol  Now no chest pain.      7/1/2022   HAS OCCASIONAL SHARP CHEST PAIN DIFFERENT THAN HER ORIGINAL EVENT  WITH CORONARY DISSECTION OF DISTAL LAD. SHE ASCENCIO SNO EXERTIONAL SYMPTOMS BACK TO YARD WORK AND BEING PHYSICALLY ACTIVE. HAS NO CHF SYMPTOMS HAS NO PALPITATION. SHE NEEDS SURGERY ON HER OVARIAN CYST. SHE IS HERE FOR CARDIAC CLEARANCE. NO H/O HTN OR ANY FAMILY H/O SCAT. HAD FIBROMUSCULAR DYSPLASIA ON RENAL ARTERIES.HAD INCREASED LFT NECESSITATING CESSATION OF STATIN THERAPY.      1/6/2023   Asymptomatic doing well clinically has not been exercising regularily tries to be compliant with diet takes meds regularily family stress. No angina .had hysterectomy in September no cardiac issues.  7/27/2023   Has been doing well has not had benefit from the cholest off she is intolerant to statins. She had stemi from spontaneous dissection she needs lipid therapy. I think [id number] is a good option for her. Asymptomatic cardiac wsie has not exercised due to foot problem. Has been very compliant with diet.      2/14/2024   Gets occsional leg cramps from repatha has other side effects stomach complaints  get nausea at times. Get shakes nervousness.      8/21/2024   Has lost weight is in [employer] HAS BEEN COMPLIANT WITH DIET AND  EXERCISE  ASYMPTOMATIC CARDIOVASCULAR SHARMA.   7/16/2025   History of Present Illness    Ms. Fuentes presents today for follow-up of spontaneous coronary dissection. She is currently asymptomatic from a cardiac perspective and denies any cardiovascular symptoms. She has no history of cardiovascular symptoms since last year's clinical evaluation. She has a history of statin intolerance with Lipitor, experiencing elevated liver tests and muscle aches. She was previously on Repatha due to statin allergy with initial insurance approval but is currently off medication since December after switching insurances and losing coverage. Current cholesterol is 213 with LDL at 123. She denies any current muscle pain or joint discomfort. A1C remains normal at 5.4 and fasting glucose is normal at 87. TSH is normal. Liver tests have normalized. She denies anemia. She reports decreased physical activity with exercise now limited to yard work due to family commitments.      ROS:  General: -fever, -chills, -fatigue, -weight gain, -weight loss, +diminished activity, +exercise intolerance  Eyes: -vision changes, -redness, -discharge  ENT: -ear pain, -nasal congestion, -sore throat  Cardiovascular: -chest pain, -palpitations, -lower extremity edema  Respiratory: -cough, -shortness of breath  Gastrointestinal: -abdominal pain, -nausea, -vomiting, -diarrhea, -constipation, -blood in stool  Genitourinary: -dysuria, -hematuria, -frequency  Musculoskeletal: -joint pain, -muscle pain  Skin: -rash, -lesion  Neurological: -headache, -dizziness, -numbness, -tingling  Psychiatric: -anxiety, -depression, -sleep difficulty              Past Medical History:   Diagnosis Date    Adenocarcinoma of colon     Colon cancer 2020    Heart attack 01/22/2022    PONV (postoperative nausea and vomiting)        Past Surgical History:   Procedure Laterality Date    COLONOSCOPY N/A 08/25/2020    Procedure: COLONOSCOPY;  Surgeon: Rianna Dillon MD;  Location:  [facility] ENDO;  Service: Endoscopy;  Laterality: N/A;    COLONOSCOPY N/A 01/07/2022    Procedure: COLONOSCOPY;  Surgeon: Ziggy Hannah MD;  Location: [facility] ENDO;  Service: General;  Laterality: N/A;    DIAGNOSTIC LAPAROSCOPY N/A 09/07/2022    Procedure: LAPAROSCOPY, DIAGNOSTIC;  Surgeon: Ziggy Hannah MD;  Location: [facility] OR;  Service: General;  Laterality: N/A;    FLEXIBLE SIGMOIDOSCOPY N/A 10/08/2020    Procedure: SIGMOIDOSCOPY, FLEXIBLE;  Surgeon: Ziggy Hannah MD;  Location: [facility] OR;  Service: General;  Laterality: N/A;    HYSTERECTOMY      INJECTION OF ANESTHETIC AGENT INTO TISSUE PLANE DEFINED BY TRANSVERSUS ABDOMINIS MUSCLE N/A 10/08/2020    Procedure: BLOCK, TRANSVERSUS ABDOMINIS PLANE;  Surgeon: Ziggy Hannah MD;  Location: [facility] OR;  Service: General;  Laterality: N/A;    LAPAROSCOPIC LYSIS OF ADHESIONS  09/07/2022    Procedure: LYSIS, ADHESIONS, LAPAROSCOPIC;  Surgeon: Ziggy Hannah MD;  Location: [facility] OR;  Service: General;;    LAPAROSCOPIC SIGMOIDECTOMY N/A 10/08/2020    Procedure: COLECTOMY, SIGMOID, LAPAROSCOPIC;  Surgeon: Ziggy Hannah MD;  Location: [facility] OR;  Service: General;  Laterality: N/A;  lower anterior resection  Proctosigmoidectomy    LAPAROTOMY N/A 10/08/2020    Procedure: LAPAROTOMY;  Surgeon: Ziggy Hannah MD;  Location: [facility] OR;  Service: General;  Laterality: N/A;    OVARIAN CYST REMOVAL      WISDOM TOOTH EXTRACTION      XI ROBOTIC HYSTERECTOMY, WITH SALPINGO-OOPHORECTOMY Bilateral 09/07/2022    Procedure: XI ROBOTIC HYSTERECTOMY,WITH SALPINGO-OOPHORECTOMY;  Surgeon: Bela Noel MD;  Location: [facility] OR;  Service: OB/GYN;  Laterality: Bilateral;       Social History[1]    Family History   Problem Relation Name Age of Onset    Fibromyalgia Sister      Other (appendix cancer) Son          LAMN Cancer    Diabetes Neg Hx      Heart disease Neg Hx         Current Outpatient Medications   Medication Sig    aspirin 81 mg Cap 81 MG  .    metoprolol succinate  "(TOPROL-XL) 25 MG 24 hr tablet Take 1 tablet by mouth once daily    tiZANidine 4 mg Cap Take 1 capsule by mouth daily as needed (pain).    rosuvastatin (CRESTOR) 20 MG tablet Take 1 tablet (20 mg total) by mouth once daily.     No current facility-administered medications for this visit.     Current Outpatient Medications on File Prior to Visit   Medication Sig    aspirin 81 mg Cap 81 MG  .    metoprolol succinate (TOPROL-XL) 25 MG 24 hr tablet Take 1 tablet by mouth once daily    tiZANidine 4 mg Cap Take 1 capsule by mouth daily as needed (pain).     No current facility-administered medications on file prior to visit.     Review of patient's allergies indicates:  No Known Allergies         Objective:     Vitals:    07/16/25 1144 07/16/25 1147   BP: 118/76 110/68   BP Location: Right arm Left arm   Patient Position: Sitting Sitting   Pulse: 68    SpO2: 98%    Weight: 72.2 kg (159 lb 2.8 oz)    Height: 5' 4" (1.626 m)      Body mass index is 27.32 kg/m².         Physical Exam  Constitutional:       General: She is not in acute distress.     Appearance: Normal appearance. She is well-developed. She is not ill-appearing.   HENT:      Head: Normocephalic and atraumatic.   Eyes:      General: No scleral icterus.     Pupils: Pupils are equal, round, and reactive to light.   Neck:      Thyroid: No thyromegaly.      Vascular: Normal carotid pulses. No carotid bruit, hepatojugular reflux or JVD.      Trachea: No tracheal deviation.   Cardiovascular:      Rate and Rhythm: Normal rate and regular rhythm.      Pulses: Normal pulses.      Heart sounds: Normal heart sounds. No murmur heard.     No friction rub. No gallop.   Pulmonary:      Effort: Pulmonary effort is normal. No respiratory distress.      Breath sounds: Normal breath sounds. No wheezing, rhonchi or rales.   Chest:      Chest wall: No tenderness.   Abdominal:      General: Bowel sounds are normal. There is no abdominal bruit.      Palpations: Abdomen is soft. There " is no hepatomegaly or pulsatile mass.      Tenderness: There is no abdominal tenderness.   Musculoskeletal:      Right shoulder: No deformity.      Cervical back: Normal range of motion and neck supple.      Right lower leg: No edema.      Left lower leg: No edema.   Skin:     General: Skin is warm and dry.      Findings: No erythema or rash.      Nails: There is no clubbing.   Neurological:      Mental Status: She is alert and oriented to person, place, and time.      Cranial Nerves: No cranial nerve deficit.      Coordination: Coordination normal.   Psychiatric:         Mood and Affect: Mood normal.         Speech: Speech normal.         Behavior: Behavior normal.       Lab Results   Component Value Date    CHOL 216 (H) 06/24/2025    CHOL 172 02/17/2025    CHOL 141 06/24/2024     Lab Results   Component Value Date    HGBA1C 5.4 06/24/2025      BMP  Lab Results   Component Value Date     06/24/2025    K 4.2 06/24/2025     06/24/2025    CO2 26 06/24/2025    BUN 17 06/24/2025    CREATININE 0.8 06/24/2025    CALCIUM 9.1 06/24/2025    ANIONGAP 11 06/24/2025    EGFRNORACEVR >60 06/24/2025      Lab Results   Component Value Date    HDL 69 06/24/2025    HDL 63 02/17/2025    HDL 61 06/24/2024     Lab Results   Component Value Date    LDLCALC 123.4 06/24/2025    LDLCALC 88.4 02/17/2025     Lab Results   Component Value Date    TRIG 118 06/24/2025    TRIG 103 02/17/2025    TRIG 70 06/24/2024     Lab Results   Component Value Date    CHOLHDL 31.9 06/24/2025    CHOLHDL 36.6 02/17/2025    CHOLHDL 43.3 06/24/2024       Chemistry        Component Value Date/Time     06/24/2025 0959     02/17/2025 0806    K 4.2 06/24/2025 0959    K 4.4 02/17/2025 0806     06/24/2025 0959     02/17/2025 0806    CO2 26 06/24/2025 0959    CO2 27 02/17/2025 0806    BUN 17 06/24/2025 0959    CREATININE 0.8 06/24/2025 0959    GLU 95 06/24/2025 0959    GLU 87 02/17/2025 0806        Component Value Date/Time    CALCIUM  "9.1 06/24/2025 0959    CALCIUM 9.8 02/17/2025 0806    ALKPHOS 76 06/24/2025 0959    ALKPHOS 73 02/17/2025 0806    AST 23 06/24/2025 0959    AST 20 02/17/2025 0806    ALT 31 06/24/2025 0959    ALT 18 02/17/2025 0806    BILITOT 0.6 06/24/2025 0959    BILITOT 0.4 02/17/2025 0806    ESTGFRAFRICA >60.0 07/25/2022 1037    EGFRNONAA >60.0 07/25/2022 1037          Lab Results   Component Value Date    TSH 2.049 06/24/2025     No results found for: "INR", "PROTIME"  Lab Results   Component Value Date    WBC 3.10 (L) 06/24/2025    HGB 12.2 06/24/2025    HCT 38.3 06/24/2025    MCV 95 06/24/2025     06/24/2025     BMP  Sodium   Date Value Ref Range Status   06/24/2025 139 136 - 145 mmol/L Final   02/17/2025 142 136 - 145 mmol/L Final     Potassium   Date Value Ref Range Status   06/24/2025 4.2 3.5 - 5.1 mmol/L Final   02/17/2025 4.4 3.5 - 5.1 mmol/L Final     Chloride   Date Value Ref Range Status   06/24/2025 102 95 - 110 mmol/L Final   02/17/2025 106 95 - 110 mmol/L Final     CO2   Date Value Ref Range Status   06/24/2025 26 23 - 29 mmol/L Final   02/17/2025 27 23 - 29 mmol/L Final     BUN   Date Value Ref Range Status   06/24/2025 17 6 - 20 mg/dL Final     Creatinine   Date Value Ref Range Status   06/24/2025 0.8 0.5 - 1.4 mg/dL Final     Calcium   Date Value Ref Range Status   06/24/2025 9.1 8.7 - 10.5 mg/dL Final   02/17/2025 9.8 8.7 - 10.5 mg/dL Final     Anion Gap   Date Value Ref Range Status   06/24/2025 11 8 - 16 mmol/L Final     eGFR if    Date Value Ref Range Status   07/25/2022 >60.0 >60 mL/min/1.73 m^2 Final     eGFR if non    Date Value Ref Range Status   07/25/2022 >60.0 >60 mL/min/1.73 m^2 Final     Comment:     Calculation used to obtain the estimated glomerular filtration  rate (eGFR) is the CKD-EPI equation.        CrCl cannot be calculated (Patient's most recent lab result is older than the maximum 7 days allowed.).    Assessment:     1. Spontaneous dissection of " coronary artery    2. Adenocarcinoma of colon    3. Colon adenocarcinoma    4. ST elevation myocardial infarction involving left anterior descending (LAD) coronary artery    5. Elevated LFTs    6. Abnormal EKG    7. Allergy to statin medication        Plan:     Assessment & Plan    I25.42 Coronary artery dissection  E78.49 Other hyperlipidemia  Z88.8 Allergy status to other drugs, medicaments and biological substances    IMPRESSION:  Previously on Repatha due to statin intolerance.  Insurance change led to Repatha denial; HLD worsened (cholesterol 213, ).  Started Crestor (rosuvastatin) to address cholesterol; if intolerant, will need to readdress Repatha with insurance.    PLAN SUMMARY:  - Ordered liver function tests  - Started Crestor (rosuvastatin) for cholesterol management  - Continue active lifestyle and aggressive risk factor modification  - Lab recheck in 3 months (mid-October)  - Follow-up if experiencing symptoms or side effects from new medication    CORONARY ARTERY DISEASE:  - Continue active lifestyle, maintain aggressive risk factor modification through exercise and diet.    HYPERLIPIDEMIA:  - Started Crestor (rosuvastatin) to address cholesterol; if intolerant, will need to readdress Repatha with insurance.  - Educated on potential side effects of statins, including muscle aches and joint pain.  - Instructed to report any symptoms or abnormal liver function test results.  - Ordered liver function tests.    MEDICATION ALLERGY STATUS:  - Contact office if experiencing symptoms or side effects from new medication.    FOLLOW-UP:  - Ordered lab recheck in 3 months, around mid-October.          This note was generated with the assistance of ambient listening technology. Verbal consent was obtained by the patient and accompanying visitor(s) for the recording of patient appointment to facilitate this note. I attest to having reviewed and edited the generated note for accuracy, though some syntax or  spelling errors may persist. Please contact the author of this note for any clarification.            [1]   Social History  Tobacco Use    Smoking status: Never     Passive exposure: Never    Smokeless tobacco: Never   Substance Use Topics    Alcohol use: Yes     Alcohol/week: 2.0 standard drinks of alcohol     Types: 1 Glasses of wine, 1 Shots of liquor per week     Comment: occasional    Drug use: Never

## 2025-07-17 LAB
OHS QRS DURATION: 76 MS
OHS QTC CALCULATION: 362 MS

## 2025-07-28 ENCOUNTER — LAB VISIT (OUTPATIENT)
Dept: LAB | Facility: HOSPITAL | Age: 60
End: 2025-07-28
Attending: COLON & RECTAL SURGERY
Payer: COMMERCIAL

## 2025-07-28 DIAGNOSIS — Z85.038 PERSONAL HISTORY OF COLON CANCER: ICD-10-CM

## 2025-07-28 DIAGNOSIS — C18.9 COLON ADENOCARCINOMA: ICD-10-CM

## 2025-07-28 LAB — CARCINOEMBRYONIC ANTIGEN (OHS): 1.8 NG/ML

## 2025-07-28 PROCEDURE — 82378 CARCINOEMBRYONIC ANTIGEN: CPT

## 2025-07-28 PROCEDURE — 36415 COLL VENOUS BLD VENIPUNCTURE: CPT | Mod: PO

## 2025-07-30 ENCOUNTER — PATIENT MESSAGE (OUTPATIENT)
Dept: CARDIOLOGY | Facility: CLINIC | Age: 60
End: 2025-07-30
Payer: COMMERCIAL

## 2025-07-30 DIAGNOSIS — I21.02 ST ELEVATION MYOCARDIAL INFARCTION INVOLVING LEFT ANTERIOR DESCENDING (LAD) CORONARY ARTERY: Primary | ICD-10-CM

## 2025-07-30 DIAGNOSIS — Z88.8 ALLERGY TO STATIN MEDICATION: ICD-10-CM

## 2025-07-31 RX ORDER — EVOLOCUMAB 140 MG/ML
140 INJECTION, SOLUTION SUBCUTANEOUS
Qty: 6 ML | Refills: 3 | Status: ACTIVE | OUTPATIENT
Start: 2025-07-31 | End: 2026-07-02

## 2025-08-04 ENCOUNTER — TELEPHONE (OUTPATIENT)
Dept: CARDIOLOGY | Facility: CLINIC | Age: 60
End: 2025-08-04
Payer: COMMERCIAL

## 2025-08-04 NOTE — TELEPHONE ENCOUNTER
----- Message from Pharmacist Severiano sent at 8/1/2025 10:57 AM CDT -----  Regarding: Repatha rx  Good morning,    [facility] has received a Repatha rx for Pt. PA shows it was already denied from previous attempt. I've reach out to Pt to inform an appeal process is the next step but needs the denial documenation to complete. Pt is aware. Please consider alternative therapy (Zetia, Nexletol, Nexlizet), in the meantime while appeal processs may take 30-45 days. Please advise.    Thank you,  Severiano Hall, PharmD  Clinical Pharmacist    [facility] [place] [address] [place] [address]  Phone: [phone]  Fax: [phone]

## 2025-08-04 NOTE — TELEPHONE ENCOUNTER
----- Message from Pharmacist Severiano sent at 8/1/2025 11:15 AM CDT -----  Regarding: Repatha internal appeal denial  Hello,    I've called Pt's insurance to obtain the denial, they had informed me the 1st level appeal was denied in February. Last level of appeal, external, may be completed. Rep stated they are unable to change the fax on file for the denial to be sent to [facility]. Please keep an eye out for the 1st level appeal, once received, please attach in Pt's  or forward to me to complete the external. Please  advise.    Thank you,  Severiano Hall, PharmD  Clinical Pharmacist    [facility] [place] [address] [place] [address]  Phone: [phone]  Fax: [phone]

## 2025-08-05 ENCOUNTER — OFFICE VISIT (OUTPATIENT)
Dept: UROLOGY | Facility: CLINIC | Age: 60
End: 2025-08-05
Payer: COMMERCIAL

## 2025-08-05 VITALS
TEMPERATURE: 98 F | DIASTOLIC BLOOD PRESSURE: 84 MMHG | RESPIRATION RATE: 16 BRPM | SYSTOLIC BLOOD PRESSURE: 155 MMHG | HEIGHT: 64 IN | BODY MASS INDEX: 27.03 KG/M2 | WEIGHT: 158.31 LBS | HEART RATE: 67 BPM

## 2025-08-05 DIAGNOSIS — R31.29 MICROHEMATURIA: Primary | ICD-10-CM

## 2025-08-05 DIAGNOSIS — N20.0 KIDNEY STONE: ICD-10-CM

## 2025-08-05 LAB
BILIRUBIN, UA POC OHS: NEGATIVE
BLOOD, UA POC OHS: ABNORMAL
CLARITY, UA POC OHS: CLEAR
COLOR, UA POC OHS: YELLOW
GLUCOSE, UA POC OHS: NEGATIVE
KETONES, UA POC OHS: NEGATIVE
LEUKOCYTES, UA POC OHS: NEGATIVE
MICROSCOPIC COMMENT: ABNORMAL
NITRITE, UA POC OHS: NEGATIVE
PH, UA POC OHS: 6.5
POC RESIDUAL URINE VOLUME: 0 ML (ref 0–100)
PROTEIN, UA POC OHS: NEGATIVE
RBC #/AREA URNS AUTO: 11 /HPF (ref 0–4)
SPECIFIC GRAVITY, UA POC OHS: 1.01
SQUAMOUS #/AREA URNS AUTO: 3 /HPF
UROBILINOGEN, UA POC OHS: 0.2
WBC #/AREA URNS AUTO: 1 /HPF (ref 0–5)

## 2025-08-05 PROCEDURE — 87086 URINE CULTURE/COLONY COUNT: CPT | Performed by: UROLOGY

## 2025-08-05 PROCEDURE — 81001 URINALYSIS AUTO W/SCOPE: CPT | Performed by: UROLOGY

## 2025-08-05 NOTE — PROGRESS NOTES
[facility]   HISTORY AND PHYSICAL    Patient Name: Italia Olvera  : 1959  MRN: [id number]  Primary Care Physician:  Carloz Shoemaker MD  Date of admission: 10/26/2023    Subjective   Subjective     Chief Complaint: Cellulitis extensive of the left leg    HPI: Patient is 63 years old white female patient was admitted because of the infected wound of the left leg recently had had a fracture and subsequently amputation of the leg on the [facility] she was recently admitted to the hospital with wound cultures and then subsequently discharged she did not want to go to the rehab at that time, difficulty in walking home health nurses have been helping however it looks very highly inflamed left leg, we will start her on broad-spectrum antibiotics we will get orthopedic consultation make sure that patient does not have any osteomyelitis, perhaps MRI of the leg will be indicated, movement will continue the antibiotics and we will ask  to look for possible placement    Italia Olvera is a 63 y.o. female patient with cellulitis    Review of Systems   All systems were reviewed and negative except for: Patient with cellulitis status post amputation of the leg    Personal History     Past Medical History:   Diagnosis Date    Acid reflux     ACL tear 2015    PCL/ACL TEAR/RUPTURE    Acute shoulder bursitis, right 2015    Anxiety     Arthritis     Asthma     Bipolar 1 disorder     untreated    Bladder disorder     Cancer     CERVICAL CANCER    CHF (congestive heart failure)     ASYMPTOMATIC- NO CARDIOLOGIST- SEE'S DR SHOEMAKER (PCP)- DENIED CP/SOB    Chronic back pain     Chronic pain     CHRONIC BACK, NECK, LEG, FOOT, & FACE PAIN    COPD (chronic obstructive pulmonary disease)     USES INHALERS    Depression     Diabetes mellitus     BG RUND AROUND 140 IN AM    DJD (degenerative joint disease)     Gangrene     RT SECOND AND FIFTH TOES    GERD (gastroesophageal reflux disease)      Chief Complaint   Patient presents with    Nephrolithiasis       Referring Provider: Dr. Annette Reeves*      History of Present Illness:   Jhoana Sierra is a 60 y.o. female here for evaluation of Nephrolithiasis    8/5/25-61yo female presents for evaluation of nephrolithiasis. She had a CT scan 3 months ago showing a 4.5mm non-obstructing L upper pole renal stone. I have personally reviewed these images. She reports that at that time, she was having Left sided abdominal pain and then developed a fever. She ultimately was treated with cipro for infection of unknown origin and she did improve. She reports that the stone first appeared on a CT scan done in 2022. She reports that she did pass a stone about 25 years ago and it spontaneously passed. States that it was calcium composition. No gross hematuria. She reports having frequent UTIs over the last 3 years or so. Sometimes it gets better with just cranberry juice and water.           Review of Systems  ***    Past Medical History:   Diagnosis Date    Adenocarcinoma of colon     Colon cancer 2020    Heart attack 01/22/2022    PONV (postoperative nausea and vomiting)        Past Surgical History:   Procedure Laterality Date    COLONOSCOPY N/A 08/25/2020    Procedure: COLONOSCOPY;  Surgeon: Rianna Dillon MD;  Location: [facility];  Service: Endoscopy;  Laterality: N/A;    COLONOSCOPY N/A 01/07/2022    Procedure: COLONOSCOPY;  Surgeon: Zigyg Hannah MD;  Location: [facility];  Service: General;  Laterality: N/A;    DIAGNOSTIC LAPAROSCOPY N/A 09/07/2022    Procedure: LAPAROSCOPY, DIAGNOSTIC;  Surgeon: Ziggy Hannah MD;  Location: [facility] OR;  Service: General;  Laterality: N/A;    FLEXIBLE SIGMOIDOSCOPY N/A 10/08/2020    Procedure: SIGMOIDOSCOPY, FLEXIBLE;  Surgeon: Ziggy Hannah MD;  Location: [facility] OR;  Service: General;  Laterality: N/A;    HYSTERECTOMY      INJECTION OF ANESTHETIC AGENT INTO TISSUE PLANE DEFINED BY TRANSVERSUS ABDOMINIS MUSCLE  HBP (high blood pressure)     Hip pain 09/15/2015    Hypertension     Knee injury 08/19/2015    Knee pain, right 09/15/2015    Limb swelling     Neuropathy     On home O2     2-3L/NC PRN    Osteoarthritis, knee 09/01/2015    Osteoporosis     Peripheral neuropathy     Renal failure 1994    NO CURRENT PROBLEMS    Seasonal allergies     Shoulder tendonitis 08/23/2015    Sleep apnea     Smoker     SOB (shortness of breath)     NONE CURENTLY    Tendinitis of right rotator cuff 08/23/2015       Past Surgical History:   Procedure Laterality Date    ABOVE KNEE AMPUTATION Right 3/11/2023    Procedure: AMPUTATION ABOVE KNEE;  Surgeon: Zacarias Haywood MD;  Location: [facility] MAIN OR;  Service: Vascular;  Laterality: Right;    AMPUTATION DIGIT Right 12/6/2022    Procedure: Amputation of right second and fifth toes;  Surgeon: Gabino Russell MD;  Location: [facility] MAIN OR;  Service: Vascular;  Laterality: Right;    BACK SURGERY      BLADDER REPAIR      BRONCHOSCOPY N/A 07/10/2021    Procedure: BRONCHOSCOPY WITH BRONCHOALVEOLAR LAVAGE, POSSIBLE BIOPSY, BRUSHING, WASHING, AIRWAY INSPECTION;  Surgeon: Rodrigo Reyes MD;  Location: [facility] MAIN OR;  Service: Pulmonary;  Laterality: N/A;    BRONCHOSCOPY N/A 07/10/2021    Procedure: BRONCHOSCOPY;  Surgeon: Rodrigo Reyes MD;  Location: [facility] ENDOSCOPY;  Service: Pulmonary;  Laterality: N/A;    CARDIAC CATHETERIZATION Right 11/03/2022    Procedure: Aortogram with right leg angiogram, possible angioplasty or stenting ;  Surgeon: Gabino Russell MD;  Location: [facility] CATH INVASIVE LOCATION;  Service: Vascular;  Laterality: Right;    CARDIAC CATHETERIZATION Right 3/8/2023    Procedure: Right leg angiogram, possible angioplasty or stenting;  Surgeon: Gabino Russell MD;  Location: [facility] CATH INVASIVE LOCATION;  Service: Vascular;  Laterality: Right;    CHOLECYSTECTOMY      ENDOSCOPY      FRACTURE SURGERY      SKULL FRACTURE    GALLBLADDER SURGERY      HERNIA REPAIR      HYSTERECTOMY       N/A 10/08/2020    Procedure: BLOCK, TRANSVERSUS ABDOMINIS PLANE;  Surgeon: Ziggy Hannah MD;  Location: [facility] OR;  Service: General;  Laterality: N/A;    LAPAROSCOPIC LYSIS OF ADHESIONS  09/07/2022    Procedure: LYSIS, ADHESIONS, LAPAROSCOPIC;  Surgeon: Ziggy Hannah MD;  Location: [facility] OR;  Service: General;;    LAPAROSCOPIC SIGMOIDECTOMY N/A 10/08/2020    Procedure: COLECTOMY, SIGMOID, LAPAROSCOPIC;  Surgeon: Ziggy Hannah MD;  Location: [facility] OR;  Service: General;  Laterality: N/A;  lower anterior resection  Proctosigmoidectomy    LAPAROTOMY N/A 10/08/2020    Procedure: LAPAROTOMY;  Surgeon: Ziggy Hannah MD;  Location: [facility] OR;  Service: General;  Laterality: N/A;    OVARIAN CYST REMOVAL      WISDOM TOOTH EXTRACTION      XI ROBOTIC HYSTERECTOMY, WITH SALPINGO-OOPHORECTOMY Bilateral 09/07/2022    Procedure: XI ROBOTIC HYSTERECTOMY,WITH SALPINGO-OOPHORECTOMY;  Surgeon: Bela Noel MD;  Location: [facility] OR;  Service: OB/GYN;  Laterality: Bilateral;       Family History   Problem Relation Name Age of Onset    Fibromyalgia Sister      Other (appendix cancer) Son          LAMN Cancer    Diabetes Neg Hx      Heart disease Neg Hx         Social History[1]    Current Medications[2]    Review of patient's allergies indicates:  No Known Allergies    Physical Exam  Vitals:    08/05/25 1009   BP: (!) 155/84   Pulse: 67   Resp: 16   Temp: 98.3 °F (36.8 °C)     General: Well-developed, well-nourished, in no acute distress  HEENT: Normocephalic, atraumatic, extraocular movements intact  Neck: Supple, no supraclavicular or cervical lymphadenopathy, trachea midline  Respirations: even and unlabored  Back: midline spine, No CVA tenderness  Abdomen: soft, Non-tender, non-distended, no palpable masses, no rebound or guarding, well-healed *** scar  : Normal external female genitalia without lesions. Orthotopic urethral meatus. *** vaginal mucosa. Grade *** Cysotcele, Grade *** Rectocele, Grade *** apical  INTERVENTIONAL RADIOLOGY PROCEDURE Right 03/03/2022    Procedure: Abdominal Aortagram with Runoff;  Surgeon: Marleny Yoon MD;  Location: [facility] INVASIVE LOCATION;  Service: Peripheral Vascular;  Laterality: Right;    JOINT REPLACEMENT      OTHER SURGICAL HISTORY      ARTIFICIAL JOINTS/LIMBS    OTHER SURGICAL HISTORY      FACE SURGERY, UNSPECIFIED    TOTAL HIP ARTHROPLASTY Right     TOTAL KNEE ARTHROPLASTY Left        Family History: family history includes Arthritis in her brother, daughter, mother, and sister; Breast cancer in her sister; Colon cancer in her sister; Diabetes in her brother; Heart disease in her brother and father; Lung cancer in her sister; Osteoporosis in her mother and sister; Stroke in her mother; Throat cancer in her mother. Otherwise pertinent FHx was reviewed and not pertinent to current issue.    Social History:  reports that she has been smoking cigarettes. She started smoking about 44 years ago. She has a 25.00 pack-year smoking history. She has never used smokeless tobacco. She reports that she does not drink alcohol and does not use drugs.    Home Medications:  apixaban, atorvastatin, benzonatate, carvedilol, hydrOXYzine pamoate, lisinopril, mirtazapine, naloxone, nortriptyline, oxyCODONE-acetaminophen, pantoprazole, sertraline, tiZANidine, and traZODone      Allergies:  No Known Allergies    Objective   Objective     Vitals:   Temp:  [97.7 °F (36.5 °C)-98.2 °F (36.8 °C)] 97.7 °F (36.5 °C)  Heart Rate:  [] 103  Resp:  [22-25] 22  BP: (151-210)/(56-94) 171/68  Flow (L/min):  [3] 3  Physical Exam    Constitutional: Alert and oriented not in moderate distress because of the pain and cellulitis   Eyes: Pupils equal reacting to light and accommodation   HENT: Normal   Neck: No palpable lymphadenopathy   Respiratory: No rales or rhonchi   Cardiovascular: S1-S2 normal no S3 or S4   Gastrointestinal: Palpable organomegaly   Musculoskeletal: Extensive cellulitis of the left leg  prolapse, *** cough stress test, *** urethral hypermobility  Extremities: moves all equally, no clubbing, cyanosis or edema  Skin: Warm and dry. No lesions  Psych: normal affect  Neuro: Alert and oriented x 3. Cranial nerves II-XII intact      Urinalysis  pH, POC UA   Date Value Ref Range Status   08/05/2025 6.5 5.0 - 8.0 Final     Protein, UA   Date Value Ref Range Status   08/21/2024 Negative Negative Final     Comment:     Recommend a 24 hour urine protein or a urine   protein/creatinine ratio if globulin induced proteinuria is  clinically suspected.       Glucose, UA   Date Value Ref Range Status   08/21/2024 Negative Negative Final     Occult Blood UA   Date Value Ref Range Status   08/21/2024 1+ (A) Negative Final     Nitrite, POC UA   Date Value Ref Range Status   08/05/2025 Negative Negative Final     Leukocytes, UA   Date Value Ref Range Status   08/21/2024 1+ (A) Negative Final       Assessment:  1. Microhematuria  Urine Culture High Risk ($$)    Urinalysis Microscopic      2. Kidney stone  Ambulatory referral/consult to Urology    POCT Urinalysis(Instrument)    POCT Bladder Scan    US Retroperitoneal Complete            Plan:   Microhematuria  -     Urine Culture High Risk ($$)  -     Urinalysis Microscopic    Kidney stone  -     Ambulatory referral/consult to Urology  -     POCT Urinalysis(Instrument)  -     POCT Bladder Scan  -     US Retroperitoneal Complete; Future; Expected date: 08/05/2026          No follow-ups on file.                 [1]   Social History  Tobacco Use    Smoking status: Never     Passive exposure: Never    Smokeless tobacco: Never   Substance Use Topics    Alcohol use: Yes     Alcohol/week: 2.0 standard drinks of alcohol     Types: 1 Glasses of wine, 1 Shots of liquor per week     Comment: occasional    Drug use: Never   [2]   Current Outpatient Medications   Medication Sig Dispense Refill    aspirin 81 mg Cap 81 MG  .      evolocumab (REPATHA SURECLICK) 140 mg/mL PnIj Inject 1 mL  (140 mg total) into the skin every 14 (fourteen) days. 6 mL 3    metoprolol succinate (TOPROL-XL) 25 MG 24 hr tablet Take 1 tablet by mouth once daily 90 tablet 3    rosuvastatin (CRESTOR) 20 MG tablet Take 1 tablet (20 mg total) by mouth once daily. (Patient not taking: Reported on 8/5/2025) 30 tablet 11    tiZANidine 4 mg Cap Take 1 capsule by mouth daily as needed (pain). 90 capsule 0     No current facility-administered medications for this visit.      right-sided below-knee above-knee amputation   Neurologic: No localizing  Skin: Sensitive cellulitis    Result Review    Result Review:  I have personally reviewed the results from the time of this admission to 10/27/2023 10:08 EDT and agree with these findings:  [x]  Laboratory anemia  []  Microbiology  []  Radiology  []  EKG/Telemetry   []  Cardiology/Vascular   []  Pathology  []  Old records  []  Other:  Most notable findings include: Patient has anemia probably of chronic disease and iron deficiency    Assessment & Plan   Assessment / Plan     Brief Patient Summary:  Italia Olvera is a 63 y.o. female who patient admitted because of severe cellulitis had been running fever at home has got markedly inflamed left leg    Active Hospital Problems:  Active Hospital Problems    Diagnosis     **CHF exacerbation        Plan:   IV antibiotics    DVT prophylaxis:  Medical DVT prophylaxis orders are present.    CODE STATUS:         Admission Status:  I believe this patient meets inpatient status.    Electronically signed by Cruz Gibson MD, 10/27/23, 10:08 AM EDT.      Part of this note may be an electronic transcription/translation of spoken language to printed text using the Dragon Dictation System.

## 2025-08-07 LAB — BACTERIA UR CULT: NORMAL

## 2025-08-08 ENCOUNTER — PATIENT MESSAGE (OUTPATIENT)
Dept: SURGERY | Facility: CLINIC | Age: 60
End: 2025-08-08
Payer: COMMERCIAL

## 2025-08-08 ENCOUNTER — TELEPHONE (OUTPATIENT)
Dept: UROLOGY | Facility: CLINIC | Age: 60
End: 2025-08-08
Payer: COMMERCIAL

## 2025-08-08 NOTE — TELEPHONE ENCOUNTER
----- Message from Amada Page MD sent at 8/7/2025 12:44 PM CDT -----  Please notify pt that she does have a significant amount of blood in her urine and no current infection. I would recommend that we schedule a cystoscopy. Please schedule.   ----- Message -----  From: Najma Bailey LPN  Sent: 8/5/2025  10:13 AM CDT  To: Amada Page MD

## 2025-08-08 NOTE — TELEPHONE ENCOUNTER
Spoke with patient who was able to provide acceptable patient identifiers prior to start of conversation. Patient notified of urinalysis microscopic results that showed a significant amount of blood and per Dr. Page would like to proceed with conducting cystoscopy. Patient in agreement. Patient scheduled for cystoscopy on 09/03/2025 1:30PM [place] location. Patient verbalized understanding, no further assistance needed.

## 2025-08-22 ENCOUNTER — TELEPHONE (OUTPATIENT)
Dept: CARDIOLOGY | Facility: CLINIC | Age: 60
End: 2025-08-22
Payer: COMMERCIAL

## 2025-08-29 DIAGNOSIS — M62.838 NIGHT MUSCLE SPASMS: Primary | ICD-10-CM

## 2025-08-29 RX ORDER — TIZANIDINE 4 MG/1
4 TABLET ORAL NIGHTLY PRN
Qty: 90 TABLET | Refills: 0 | Status: SHIPPED | OUTPATIENT
Start: 2025-08-29

## 2025-09-05 ENCOUNTER — OFFICE VISIT (OUTPATIENT)
Dept: PODIATRY | Facility: CLINIC | Age: 60
End: 2025-09-05
Payer: COMMERCIAL

## 2025-09-05 VITALS — WEIGHT: 157.44 LBS | BODY MASS INDEX: 26.88 KG/M2 | HEIGHT: 64 IN

## 2025-09-05 DIAGNOSIS — M79.672 LEFT FOOT PAIN: Primary | ICD-10-CM

## 2025-09-05 DIAGNOSIS — G57.82 NEUROMA OF THIRD INTERSPACE OF LEFT FOOT: ICD-10-CM

## 2025-09-05 DIAGNOSIS — R20.0 NUMBNESS OF TOES: ICD-10-CM

## 2025-09-05 PROCEDURE — 99999 PR PBB SHADOW E&M-EST. PATIENT-LVL III: CPT | Mod: PBBFAC,,, | Performed by: PODIATRIST

## (undated) DEVICE — ADHESIVE DERMABOND ADVANCED

## (undated) DEVICE — DRAPE ABDOMINAL TIBURON 14X11

## (undated) DEVICE — KIT ANTIFOG

## (undated) DEVICE — SUT VICRYL CTD 2-0 GI 27 SH

## (undated) DEVICE — SEE MEDLINE ITEM 152622

## (undated) DEVICE — GAUZE AVANT SPNG 4PLY STRL 4X4

## (undated) DEVICE — SYR 10CC LUER LOCK

## (undated) DEVICE — GLOVE BIOGEL ECLIPSE SZ 7.5

## (undated) DEVICE — NDL PNEUMO INSUFFLATI 120MM

## (undated) DEVICE — HEADREST ROUND DISP FOAM 9IN

## (undated) DEVICE — Device

## (undated) DEVICE — SPONGE LAP 18X18 PREWASHED

## (undated) DEVICE — SUT MONOCRYL 4.0 PS2 CP496G

## (undated) DEVICE — TROCAR ENDOPATH XCEL 5X100MM

## (undated) DEVICE — TAPE SILK 3IN

## (undated) DEVICE — GAUZE SPONGE 4X4 12PLY

## (undated) DEVICE — CART STAPLE FLEX ETX 3.5MM BLU

## (undated) DEVICE — COVER TIP CURVED SCISSORS XI

## (undated) DEVICE — TAPE CLOTH SOFT MEDIPORE 4IN

## (undated) DEVICE — SUT 2/0 30IN PROLENE MONO

## (undated) DEVICE — NDL SAFETY 25G X 1.5 ECLIPSE

## (undated) DEVICE — SUPPORT ULNA NERVE PROTECTOR

## (undated) DEVICE — TRAY SKIN SCRUB WET PREMIUM

## (undated) DEVICE — SEE MEDLINE ITEM 157027

## (undated) DEVICE — SUT VICRYL PLUS 3-0 SH 18IN

## (undated) DEVICE — PACK DRAPE PERI/GYN TIBURON

## (undated) DEVICE — STAPLER INT LINEAR ARTC 3.5-45

## (undated) DEVICE — DRAPE ARM DAVINCI XI

## (undated) DEVICE — SEAL UNIVERSAL 5MM-8MM XI

## (undated) DEVICE — SEE MEDLINE ITEM 157117

## (undated) DEVICE — STAPLER CIRCULAR 25MM

## (undated) DEVICE — SEAL SCOPE WARMER 20/BX

## (undated) DEVICE — TROCAR ENDOPATH XCEL 12X100MM

## (undated) DEVICE — APPLICATOR CHLORAPREP ORN 26ML

## (undated) DEVICE — TUBING HEATED INSUFFLATOR

## (undated) DEVICE — SUT V-LOC 180 ABD 2/0 GS-21

## (undated) DEVICE — SET CYSTO IRRIGATION UNIV SPIK

## (undated) DEVICE — COVER LIGHT HANDLE 80/CA

## (undated) DEVICE — DRESSING TRANS 2X2 TEGADERM

## (undated) DEVICE — ELECTRODE REM PLYHSV RETURN 9

## (undated) DEVICE — SOL ELECTROLUBE ANTI-STIC

## (undated) DEVICE — JELLY SURGILUBE LUBE PKT 3GM

## (undated) DEVICE — TROCAR KII BLLN 12MM 10CM

## (undated) DEVICE — TRAY CATH FOL SIL URIMTR 16FR

## (undated) DEVICE — SUT PROLENE 0 MO6 30IN BLUE

## (undated) DEVICE — STAPLER INT PROX TX 60X3.5MM

## (undated) DEVICE — SOL NS 1000CC

## (undated) DEVICE — MANIPULATOR VCARE PLUS 34MM

## (undated) DEVICE — IRRIGATOR ENDOSCOPY DISP.

## (undated) DEVICE — PORT ACCESS 8MM W/120MM LOW

## (undated) DEVICE — GLOVE SURGICAL LATEX SZ 7

## (undated) DEVICE — TIP GRASPER FENESTRATED DISP

## (undated) DEVICE — KIT GELPORT LAPAROSCOPIC ABD

## (undated) DEVICE — NDL SPINAL 18GX3.5 SPINOCAN

## (undated) DEVICE — COVER OVERHEAD SURG LT BLUE

## (undated) DEVICE — MANIFOLD 4 PORT

## (undated) DEVICE — PACK BASIC SETUP SC BR

## (undated) DEVICE — SYR 3CC LUER LOC

## (undated) DEVICE — CORD LAP 10 DISP

## (undated) DEVICE — DRAPE UINDERBUT GRAD PCH

## (undated) DEVICE — DEVICE CLOSURE DISP 14G

## (undated) DEVICE — CANNULA ENDOPATH XCEL 5X100MM

## (undated) DEVICE — SEALER LIGASURE LAP 37CM 5MM

## (undated) DEVICE — SUT PROLENE 2-0 SH 36IN BLU

## (undated) DEVICE — SYR 30CC LUER LOCK

## (undated) DEVICE — DRAPE COLUMN DAVINCI XI

## (undated) DEVICE — SET PNEUMOCLEAR HEAT HUM SE HF

## (undated) DEVICE — DEVICE RESOLUTION 360 CLIP

## (undated) DEVICE — PENCIL ELECTROCAUTERY W/ HLSTR

## (undated) DEVICE — SCISSOR 5MMX35CM DIRECT DRIVE